# Patient Record
Sex: MALE | Race: WHITE | Employment: OTHER | URBAN - METROPOLITAN AREA
[De-identification: names, ages, dates, MRNs, and addresses within clinical notes are randomized per-mention and may not be internally consistent; named-entity substitution may affect disease eponyms.]

---

## 2017-01-13 ENCOUNTER — ANESTHESIA (OUTPATIENT)
Dept: ENDOSCOPY | Age: 62
End: 2017-01-13
Payer: COMMERCIAL

## 2017-01-13 ENCOUNTER — ANESTHESIA EVENT (OUTPATIENT)
Dept: ENDOSCOPY | Age: 62
End: 2017-01-13
Payer: COMMERCIAL

## 2017-01-13 ENCOUNTER — SURGERY (OUTPATIENT)
Age: 62
End: 2017-01-13

## 2017-01-13 PROCEDURE — 74011250636 HC RX REV CODE- 250/636

## 2017-01-13 RX ORDER — PROPOFOL 10 MG/ML
INJECTION, EMULSION INTRAVENOUS AS NEEDED
Status: DISCONTINUED | OUTPATIENT
Start: 2017-01-13 | End: 2017-01-13 | Stop reason: HOSPADM

## 2017-01-13 RX ORDER — SODIUM CHLORIDE 9 MG/ML
INJECTION, SOLUTION INTRAVENOUS
Status: DISCONTINUED | OUTPATIENT
Start: 2017-01-13 | End: 2017-01-13 | Stop reason: HOSPADM

## 2017-01-13 RX ADMIN — PROPOFOL 50 MG: 10 INJECTION, EMULSION INTRAVENOUS at 12:17

## 2017-01-13 RX ADMIN — PROPOFOL 50 MG: 10 INJECTION, EMULSION INTRAVENOUS at 12:10

## 2017-01-13 RX ADMIN — SODIUM CHLORIDE: 9 INJECTION, SOLUTION INTRAVENOUS at 11:23

## 2017-01-13 RX ADMIN — PROPOFOL 50 MG: 10 INJECTION, EMULSION INTRAVENOUS at 11:59

## 2017-01-13 RX ADMIN — PROPOFOL 50 MG: 10 INJECTION, EMULSION INTRAVENOUS at 12:03

## 2017-01-13 RX ADMIN — PROPOFOL 50 MG: 10 INJECTION, EMULSION INTRAVENOUS at 11:55

## 2017-01-13 NOTE — ANESTHESIA PREPROCEDURE EVALUATION
Anesthetic History               Review of Systems / Medical History      Pulmonary                   Neuro/Psych              Cardiovascular    Hypertension                   GI/Hepatic/Renal     GERD           Endo/Other             Other Findings              Physical Exam    Airway  Mallampati: I  TM Distance: > 6 cm  Neck ROM: normal range of motion   Mouth opening: Normal     Cardiovascular  Regular rate and rhythm,  S1 and S2 normal,  no murmur, click, rub, or gallop             Dental  No notable dental hx       Pulmonary  Breath sounds clear to auscultation               Abdominal  GI exam deferred       Other Findings            Anesthetic Plan    ASA: 2  Anesthesia type: MAC          Induction: Intravenous  Anesthetic plan and risks discussed with: Patient

## 2017-01-13 NOTE — ANESTHESIA POSTPROCEDURE EVALUATION
Post-Anesthesia Evaluation and Assessment    Patient: Ryland Ricks MRN: [id number]  SSN: [id number]    YOB: 1955  Age: 64 y.o. Sex: male       Cardiovascular Function/Vital Signs  Visit Vitals    BP 95/71    Pulse 86    Temp 36.8 °C (98.3 °F)    Resp 10    Ht 6' (1.829 m)    Wt 82.1 kg (181 lb)    SpO2 98%    BMI 24.55 kg/m2       Patient is status post MAC anesthesia for Procedure(s):  EGD  ENDOSCOPIC ULTRASOUND (EUS)  ESOPHAGOGASTRODUODENAL (EGD) BIOPSY. Nausea/Vomiting: None    Postoperative hydration reviewed and adequate. Pain:  Pain Scale 1: Adult Nonverbal Pain Scale (01/13/17 1235)  Pain Intensity 1: 0 (01/13/17 1235)   Managed    Neurological Status: At baseline    Mental Status and Level of Consciousness: Arousable    Pulmonary Status:   O2 Device: Room air (01/13/17 1235)   Adequate oxygenation and airway patent    Complications related to anesthesia: None    Post-anesthesia assessment completed.  No concerns    Signed By: Jasbir Chávez MD     January 13, 2017

## 2017-01-17 ENCOUNTER — HOSPITAL ENCOUNTER (OUTPATIENT)
Dept: ULTRASOUND IMAGING | Age: 62
Discharge: HOME | End: 2017-01-17
Attending: INTERNAL MEDICINE

## 2017-02-08 ENCOUNTER — OFFICE VISIT (OUTPATIENT)
Dept: SURGERY | Age: 62
End: 2017-02-08

## 2017-02-08 VITALS
RESPIRATION RATE: 16 BRPM | DIASTOLIC BLOOD PRESSURE: 88 MMHG | WEIGHT: 181 LBS | SYSTOLIC BLOOD PRESSURE: 140 MMHG | HEART RATE: 75 BPM | OXYGEN SATURATION: 98 % | TEMPERATURE: 98.4 F | HEIGHT: 72 IN | BODY MASS INDEX: 24.52 KG/M2

## 2017-02-08 DIAGNOSIS — K86.89 PANCREATIC DUCT OBSTRUCTION: Primary | ICD-10-CM

## 2017-02-08 NOTE — PROGRESS NOTES
1. Have you been to the ER, urgent care clinic since your last visit? Hospitalized since your last visit? No    2. Have you seen or consulted any other health care providers outside of the [address] [place] [address] since your last visit? Include any pap smears or colon screening.  Yes - Dr. Suly Cronin

## 2017-02-08 NOTE — MR AVS SNAPSHOT
Visit Information Date & Time Provider Department Dept. Phone Encounter #  
 2/8/2017  9:20 AM Remington Warner, [address] [facility] [phone] [id number] Upcoming Health Maintenance Date Due Hepatitis C Screening 1955 DTaP/Tdap/Td series (1 - Tdap) 9/27/1976 FOBT Q 1 YEAR AGE 50-75 9/27/2005 ZOSTER VACCINE AGE 60> 9/27/2015 INFLUENZA AGE 9 TO ADULT 8/1/2016 Allergies as of 2/8/2017  Review Complete On: 2/8/2017 By: Remington Warner MD  
  
 Severity Noted Reaction Type Reactions Aspirin  01/19/2016    Other (comments) Does not take d/t chronic pancreatitis. Current Immunizations  Reviewed on 2/16/2016 No immunizations on file. Not reviewed this visit You Were Diagnosed With   
  
 Codes Comments Pancreatic duct obstruction    -  Primary ICD-10-CM: K86.89 
ICD-9-CM: 577.8 Vitals BP Pulse Temp Resp Height(growth percentile) Weight(growth percentile) 140/88 (BP 1 Location: Right arm, BP Patient Position: Sitting) 75 98.4 °F (36.9 °C) (Oral) 16 6' (1.829 m) 181 lb (82.1 kg) SpO2 BMI Smoking Status 98% 24.55 kg/m2 Former Smoker BMI and BSA Data Body Mass Index Body Surface Area 24.55 kg/m 2 2.04 m 2 Preferred Pharmacy Pharmacy Name Phone Tammy Romo [facility] [phone] Your Updated Medication List  
  
   
This list is accurate as of: 2/8/17  9:54 AM.  Always use your most recent med list.  
  
  
  
  
 acetaminophen 325 mg tablet Commonly known as:  TYLENOL Take  by mouth as needed for Pain. CREON PO Take  by mouth. 85367 units po with large meal. 07083 po with small meal.15772 units po with a snack. FLUoxetine 20 mg tablet Commonly known as:  PROzac Take 20 mg by mouth daily. HYDROmorphone 2 mg tablet Commonly known as:  DILAUDID  
 Take 1 Tab by mouth every four (4) hours as needed. Max Daily Amount: 12 mg.  
  
 omeprazole 20 mg capsule Commonly known as:  PRILOSEC Take 20 mg by mouth daily. Introducing [facility] SERVICES! [facility] introduces [employer] patient portal. Now you can access parts of your medical record, email your doctor's office, and request medication refills online. 1. In your internet browser, go to [email or web address] 2. Click on the First Time User? Click Here link in the Sign In box. You will see the New Member Sign Up page. 3. Enter your [employer] Access Code exactly as it appears below. You will not need to use this code after youve completed the sign-up process. If you do not sign up before the expiration date, you must request a new code. · [employer] Access Code: [email or web address] Expires: 3/15/2017 11:35 AM 
 
4. Enter the last four digits of your Social Security Number (xxxx) and Date of Birth (mm/dd/yyyy) as indicated and click Submit. You will be taken to the next sign-up page. 5. Create a [employer]visors ID. This will be your [employer]advisors login ID and cannot be changed, so think of one that is secure and easy to remember. 6. Create a [employer]visors password. You can change your password at any time. 7. Enter your Password Reset Question and Answer. This can be used at a later time if you forget your password. 8. Enter your e-mail address. You will receive e-mail notification when new information is available in [address] 9. Click Sign Up. You can now view and download portions of your medical record. 10. Click the Download Summary menu link to download a portable copy of your medical information. If you have questions, please visit the Frequently Asked Questions section of the [employer]advisors website. Remember, [employer]visors is NOT to be used for urgent needs. For medical emergencies, dial 911. Now available from your iPhone and Android! Please provide this summary of care documentation to your next provider. Your primary care clinician is listed as Jose A Anderson. If you have any questions after today's visit, please call [phone].

## 2017-02-08 NOTE — PROGRESS NOTES
Surgery History and Physical    Subjective:      Twana Rinne. is a 64 y.o.  male who presents for evaluation of ongoing intermittent abdominal and back pain. He was referred by Dr. Perez Jurado to consider possible pancreatic surgery. His labs also showed mildly elevated CA 19-9 of 65. Today, he reports the pain started in Nov 2016 and indicated the back was worse than his abdomen. He notes that the recent attacks has been more intense but he also go days without pain. His appetite is unaffected. Previous imaging studies include: EGD (1/13/17) and CT ABD (12/22/16). 12/22/16 CT ABD:  Pancreatic atrophy with ductal dilatation is again seen. Unchanged coarse  calcifications and small cystic abnormalities in the pancreatic head. A small  cyst is now seen extending across the ampulla into the duodenum measuring 1 cm. Otherwise, no significant change in appearance of the pancreas.  No new fluid  collection or pseudocyst    Chief Complaint   Patient presents with    Surgical Follow-up       Patient Active Problem List    Diagnosis Date Noted    Pancreatic duct obstruction 02/08/2017    SBO (small bowel obstruction) ([address] 02/12/2016    Dehydration 02/08/2016    Temporary high blood pressure 02/02/2016    GERD (gastroesophageal reflux disease) 02/02/2016    Bile duct stricture 01/27/2016    Abdominal pain 08/14/2014     Past Medical History   Diagnosis Date    Adverse effect of anesthesia      woke up during ERCP - gasping    Bile duct stricture 1/27/2016    Chronic pain      chronic pancreatitis    GERD (gastroesophageal reflux disease)      Thompson's Esophagus    Ill-defined condition      blood infection x 4 per pt    Other ill-defined conditions(799.89)      insomnia    Pancreatic duct obstruction 2/8/2017    Pancreatitis     Psychiatric disorder      Depression    Unspecified adverse effect of anesthesia      woke up during ERCP      Past Surgical History   Procedure Laterality Date    Hx tonsillectomy      Vascular surgery procedure unlist       vein stripped from left leg    Hx hernia repair       age 2 yrs left inguinal    Hx other surgical       ERCPS - stents and removal since 1995 last 8/2014     Hx other surgical       ERCP with stent 4/2015    Hx orthopaedic       ganglion cyst removed left wrist x 2    Hx orthopaedic       left knee fracture 2013-1/30    Hx orthopaedic       meniscus repair (2) on left knee    Hx other surgical  2/09/16     LAPAROTOMY EXPLORATORY; SMALL BOWEL RESECTION    Hx other surgical       1. Exploratory laparotomy 2. Resection of the necrotic proximal small bowel and oversewing of enteroenterotomy closure - [facility]-Dr. Billie Howard      Social History   Substance Use Topics    Smoking status: Former Smoker     Packs/day: 0.50     Years: 5.00     Quit date: 1/29/1977    Smokeless tobacco: Never Used      Comment: quit smoking cigarettes 30 yrs ago    Alcohol use No      Comment: none in 6-8 yrs      Family History   Problem Relation Age of Onset    Stroke Mother      ?  Stroke Father     Other Brother      Agent Orange    Stroke Brother     Anesth Problems Neg Hx       Prior to Admission medications    Medication Sig Start Date End Date Taking? Authorizing Provider   omeprazole (PRILOSEC) 20 mg capsule Take 20 mg by mouth daily. Yes Historical Provider   acetaminophen (TYLENOL) 325 mg tablet Take  by mouth as needed for Pain. Yes Historical Provider   FLUoxetine (PROZAC) 20 mg tablet Take 20 mg by mouth daily. Yes Historical Provider   LIPASE/PROTEASE/AMYLASE (CREON PO) Take  by mouth. 52845 units po with large meal. 71964 po with small meal.13385 units po with a snack. Yes Historical Provider   HYDROmorphone (DILAUDID) 2 mg tablet Take 1 Tab by mouth every four (4) hours as needed. Max Daily Amount: 12 mg. 2/19/16   Florencia Patel MD     Allergies   Allergen Reactions    Aspirin Other (comments)     Does not take d/t chronic pancreatitis. Review of Systems:    A comprehensive review of systems was negative except for that written in the History of Present Illness. Objective:     Visit Vitals    /88 (BP 1 Location: Right arm, BP Patient Position: Sitting)    Pulse 75    Temp 98.4 °F (36.9 °C) (Oral)    Resp 16    Ht 6' (1.829 m)    Wt 181 lb (82.1 kg)    SpO2 98%    BMI 24.55 kg/m2       Physical Exam:  General appearance: alert, cooperative, no distress, appears stated age  Head: Normocephalic, without obvious abnormality, atraumatic  Abdomen: soft, non-tender. Bowel sounds normal. No masses,  no organomegaly, no ascites, incision well healed   Lymph nodes: Cervical, supraclavicular, and axillary nodes normal.  Neurologic: Grossly normal      Assessment:       ICD-10-CM ICD-9-CM    1. Pancreatic duct obstruction K86.89 577.8         I believe that the patient will benefit from a PEUSTOW procedure to relieve his distal pancreatic ducal obstruction. Plan:     I'll be presenting his case to the hepatic biliary conference before making any recommendations to the patient. Written by geetha Farias, as dictated by Chandler Siemens Brandon Sol, MD.    Total face to face time with patient: 17 minutes. Greater than 50% of the time was spent in counseling. Signed By: Chandler Siemens Brandon Sol, MD    February 8, 2017

## 2017-02-13 ENCOUNTER — TELEPHONE (OUTPATIENT)
Dept: SURGERY | Age: 62
End: 2017-02-13

## 2017-02-13 NOTE — TELEPHONE ENCOUNTER
Told him we were going to discuss his case this Thursday. I am still inclined to do a Peustow.   He has had no pain since the visit to the office

## 2017-02-17 ENCOUNTER — TELEPHONE (OUTPATIENT)
Dept: SURGERY | Age: 62
End: 2017-02-17

## 2017-02-17 NOTE — TELEPHONE ENCOUNTER
Discussed exploration and biopsy of the head of the pancreas first.  Would do Peustow if that is OK, but may need to do a Whipple if things look like that would be the best choice.   He agrees will call back when he is ready

## 2017-07-12 ENCOUNTER — HOSPITAL ENCOUNTER (OUTPATIENT)
Age: 62
Setting detail: OUTPATIENT SURGERY
Discharge: HOME | End: 2017-07-12
Attending: INTERNAL MEDICINE | Admitting: INTERNAL MEDICINE
Payer: COMMERCIAL

## 2017-07-12 ENCOUNTER — APPOINTMENT (OUTPATIENT)
Dept: ULTRASOUND IMAGING | Age: 62
End: 2017-07-12
Attending: INTERNAL MEDICINE
Payer: COMMERCIAL

## 2017-07-12 ENCOUNTER — ANESTHESIA (OUTPATIENT)
Dept: ENDOSCOPY | Age: 62
End: 2017-07-12
Payer: COMMERCIAL

## 2017-07-12 ENCOUNTER — ANESTHESIA EVENT (OUTPATIENT)
Dept: ENDOSCOPY | Age: 62
End: 2017-07-12
Payer: COMMERCIAL

## 2017-07-12 VITALS
DIASTOLIC BLOOD PRESSURE: 75 MMHG | BODY MASS INDEX: 24.68 KG/M2 | WEIGHT: 182.19 LBS | HEART RATE: 67 BPM | HEIGHT: 72 IN | TEMPERATURE: 97.6 F | RESPIRATION RATE: 16 BRPM | OXYGEN SATURATION: 98 % | SYSTOLIC BLOOD PRESSURE: 111 MMHG

## 2017-07-12 PROCEDURE — 77030036673 HC NDL BIOP ENDOSC SHRKCOR PRELD COVD -E: Performed by: INTERNAL MEDICINE

## 2017-07-12 PROCEDURE — 76040000019: Performed by: INTERNAL MEDICINE

## 2017-07-12 PROCEDURE — 88173 CYTOPATH EVAL FNA REPORT: CPT | Performed by: INTERNAL MEDICINE

## 2017-07-12 PROCEDURE — 74011000250 HC RX REV CODE- 250

## 2017-07-12 PROCEDURE — 74011000258 HC RX REV CODE- 258

## 2017-07-12 PROCEDURE — 74011250636 HC RX REV CODE- 250/636

## 2017-07-12 PROCEDURE — 76060000031 HC ANESTHESIA FIRST 0.5 HR: Performed by: INTERNAL MEDICINE

## 2017-07-12 PROCEDURE — 88307 TISSUE EXAM BY PATHOLOGIST: CPT | Performed by: INTERNAL MEDICINE

## 2017-07-12 RX ORDER — MIDAZOLAM HYDROCHLORIDE 1 MG/ML
.25-1 INJECTION, SOLUTION INTRAMUSCULAR; INTRAVENOUS
Status: DISCONTINUED | OUTPATIENT
Start: 2017-07-12 | End: 2017-07-12 | Stop reason: HOSPADM

## 2017-07-12 RX ORDER — PROPOFOL 10 MG/ML
INJECTION, EMULSION INTRAVENOUS AS NEEDED
Status: DISCONTINUED | OUTPATIENT
Start: 2017-07-12 | End: 2017-07-12 | Stop reason: HOSPADM

## 2017-07-12 RX ORDER — ATROPINE SULFATE 0.1 MG/ML
0.5 INJECTION INTRAVENOUS
Status: DISCONTINUED | OUTPATIENT
Start: 2017-07-12 | End: 2017-07-12 | Stop reason: HOSPADM

## 2017-07-12 RX ORDER — NALOXONE HYDROCHLORIDE 0.4 MG/ML
0.4 INJECTION, SOLUTION INTRAMUSCULAR; INTRAVENOUS; SUBCUTANEOUS
Status: DISCONTINUED | OUTPATIENT
Start: 2017-07-12 | End: 2017-07-12 | Stop reason: HOSPADM

## 2017-07-12 RX ORDER — LIDOCAINE HYDROCHLORIDE 20 MG/ML
INJECTION, SOLUTION EPIDURAL; INFILTRATION; INTRACAUDAL; PERINEURAL AS NEEDED
Status: DISCONTINUED | OUTPATIENT
Start: 2017-07-12 | End: 2017-07-12 | Stop reason: HOSPADM

## 2017-07-12 RX ORDER — SODIUM CHLORIDE 9 MG/ML
50 INJECTION, SOLUTION INTRAVENOUS CONTINUOUS
Status: DISCONTINUED | OUTPATIENT
Start: 2017-07-12 | End: 2017-07-12 | Stop reason: HOSPADM

## 2017-07-12 RX ORDER — SODIUM CHLORIDE 0.9 % (FLUSH) 0.9 %
5-10 SYRINGE (ML) INJECTION EVERY 8 HOURS
Status: DISCONTINUED | OUTPATIENT
Start: 2017-07-12 | End: 2017-07-12 | Stop reason: HOSPADM

## 2017-07-12 RX ORDER — SODIUM CHLORIDE 9 MG/ML
INJECTION, SOLUTION INTRAVENOUS
Status: DISCONTINUED | OUTPATIENT
Start: 2017-07-12 | End: 2017-07-12 | Stop reason: HOSPADM

## 2017-07-12 RX ORDER — DEXTROMETHORPHAN/PSEUDOEPHED 2.5-7.5/.8
1.2 DROPS ORAL
Status: DISCONTINUED | OUTPATIENT
Start: 2017-07-12 | End: 2017-07-12 | Stop reason: HOSPADM

## 2017-07-12 RX ORDER — SODIUM CHLORIDE 0.9 % (FLUSH) 0.9 %
5-10 SYRINGE (ML) INJECTION AS NEEDED
Status: DISCONTINUED | OUTPATIENT
Start: 2017-07-12 | End: 2017-07-12 | Stop reason: HOSPADM

## 2017-07-12 RX ORDER — FLUMAZENIL 0.1 MG/ML
0.2 INJECTION INTRAVENOUS
Status: DISCONTINUED | OUTPATIENT
Start: 2017-07-12 | End: 2017-07-12 | Stop reason: HOSPADM

## 2017-07-12 RX ORDER — EPINEPHRINE 0.1 MG/ML
1 INJECTION INTRACARDIAC; INTRAVENOUS
Status: DISCONTINUED | OUTPATIENT
Start: 2017-07-12 | End: 2017-07-12 | Stop reason: HOSPADM

## 2017-07-12 RX ORDER — FENTANYL CITRATE 50 UG/ML
100 INJECTION, SOLUTION INTRAMUSCULAR; INTRAVENOUS
Status: DISCONTINUED | OUTPATIENT
Start: 2017-07-12 | End: 2017-07-12 | Stop reason: HOSPADM

## 2017-07-12 RX ADMIN — LIDOCAINE HYDROCHLORIDE 40 MG: 20 INJECTION, SOLUTION EPIDURAL; INFILTRATION; INTRACAUDAL; PERINEURAL at 15:17

## 2017-07-12 RX ADMIN — PROPOFOL 50 MG: 10 INJECTION, EMULSION INTRAVENOUS at 15:19

## 2017-07-12 RX ADMIN — PROPOFOL 50 MG: 10 INJECTION, EMULSION INTRAVENOUS at 15:31

## 2017-07-12 RX ADMIN — PROPOFOL 50 MG: 10 INJECTION, EMULSION INTRAVENOUS at 15:24

## 2017-07-12 RX ADMIN — PROPOFOL 50 MG: 10 INJECTION, EMULSION INTRAVENOUS at 15:27

## 2017-07-12 RX ADMIN — PROPOFOL 50 MG: 10 INJECTION, EMULSION INTRAVENOUS at 15:33

## 2017-07-12 RX ADMIN — PROPOFOL 50 MG: 10 INJECTION, EMULSION INTRAVENOUS at 15:21

## 2017-07-12 RX ADMIN — PROPOFOL 150 MG: 10 INJECTION, EMULSION INTRAVENOUS at 15:17

## 2017-07-12 RX ADMIN — PROPOFOL 50 MG: 10 INJECTION, EMULSION INTRAVENOUS at 15:29

## 2017-07-12 RX ADMIN — PROPOFOL 50 MG: 10 INJECTION, EMULSION INTRAVENOUS at 15:35

## 2017-07-12 RX ADMIN — SODIUM CHLORIDE: 9 INJECTION, SOLUTION INTRAVENOUS at 15:07

## 2017-07-12 NOTE — ANESTHESIA POSTPROCEDURE EVALUATION
Post-Anesthesia Evaluation and Assessment    Patient: Chase Lacy. MRN: [id number]  SSN: [id number]    YOB: 1955  Age: 64 y.o. Sex: male       Cardiovascular Function/Vital Signs  Visit Vitals    /75    Pulse 84    Temp 36.8 °C (98.2 °F)    Resp 18    Ht 6' (1.829 m)    Wt 82.6 kg (182 lb 3 oz)    SpO2 94%    BMI 24.71 kg/m2       Patient is status post MAC anesthesia for Procedure(s):  LINEAR EUS WITH PATH  FINE NEEDLE ASPIRATION. Nausea/Vomiting: None    Postoperative hydration reviewed and adequate. Pain:  Pain Scale 1: Visual (07/12/17 1544)  Pain Intensity 1: 0 (07/12/17 1544)   Managed    Neurological Status: At baseline    Mental Status and Level of Consciousness: Arousable    Pulmonary Status:   O2 Device: CO2 nasal cannula (07/12/17 1537)   Adequate oxygenation and airway patent    Complications related to anesthesia: None    Post-anesthesia assessment completed.  No concerns    Signed By: Leigh Collier MD     July 12, 2017

## 2017-07-12 NOTE — PROCEDURES
[address]  [address] [facility] [address], [address]   [phone]                                Endoscopic Ultrasound    NAME:  Isaac Fleming. :   1955   MRN:   [id number]       Date/Time:  2017   Procedure Type: Linear Upper EUS with FNA    Indications: Pancreatitis - chronic, Abnormal CT scan showing pancreas head mass    Pre-operative Diagnosis: see indication above    Post-operative Diagnosis:  See findings below    : Washington Whaley MD    Referring Provider: -Sofie Garg MD    Anethesia/Sedation:  MAC anesthesia      Procedure Details   After infom consent was obtained for the procedure, with all risks and benefits of procedure explained the patient was taken to the endoscopy suite and placed in the left lateral decubitus position. Following sequential administration of sedation as per above, the linear echoendoscope was inserted into the mouth and advanced under direct vision to second portion of the duodenum. A careful inspection was made as the gastroscope was withdrawn, including a retroflexed view of the proximal stomach; findings and interventions are described below. Findings:     Endoscopic:            Esophagus: Z line was irregular. No esophagitis or mass was seen                        Stomach: normal                         Duodenum/jejunum: A short segment stricture was seen in the 2nd portion of duodenum in the region of ampulla. There was mucosal edema and congestion, more so on the medial duodenal wall. The stricture could be negotiated with mild resistance. No definite mass was seen.  Rest of the duodenum was normal     Ultrasound:                        Esophagus: not examined                        Stomach: not examined                        Pancreas:                                               Areas examined: the entire gland                                              Parenchyma: Extensive calcifications, and heterogenous appearing pancreas was seen in the head of pancreas. The head was not as bulky as noted on previous EUS and CT scan. Inflammatory stranding appears to have improved. There was marked tortuosity of the pancreatic duct in the head and body. No definite mass was seen. Some of the ectatic and dilated side branches gave an impression of cystic collections. Liver:                                               Parenchyma: normal left lobe                                              Gallbladder: surgically absent                                              Bile Duct: the common bile duct was not visualized well due to anastomosed bowel loop interposing between. Lymph Node: no adenopathy      Specimen Removed:  Biopsy of  the head of the pancreas    Complications: None. EBL:  None.     Interventions: Fine needle aspirate for biopsy of  pancreas head using a 25 gauge SharkCore needle with 2 of passes with preliminary results suggesting benign      Recommendations:   -Await final cytology results  -Watch for complications  -Continue current medications  -Follow up with Dr Shona Mccarthy after final cytology results are back for consideration of total pancreatectomy and auto islet cell transplantat  -Follow up with me as scheduled    Sunshine Andrews MD  7/12/2017  3:48 PM

## 2017-07-12 NOTE — PROGRESS NOTES
Gayatri Higuera.  1955  [id number]    Situation:    Scheduled Procedure: Procedure(s):  LINEAR EUS WITH PATH  Verbal report received from: HEATHER SALMERON RN, RN  Preoperative diagnosis: CYST OF PANCREAS, STRICTURE OF BILE DUCT    Background:    Procedure: Procedure(s):  LINEAR EUS WITH PATH  Physician performing procedure; Dr. Main Shankar MD    SBAR QUESTIONS FLOOR TO ENDO RN    NPO Status/Last PO Intake: NPO    Pregnancy Test:Not applicable If yes, result: none    Is the patient taking Blood Thinners: NO If yes, list: 0 and last taken 0  Is the patient diabetic:no       If yes, what was the last BS:  0  Time taken? 0  Anything given? no           Does the patient have a Pacemaker/Defibrillator in place?: no   Does the patient need antibiotics before/during/after procedure: no   If the patient is having a colon, How much prep was drank? N/A   What were the Colon prep results? N/A   Does the patient have SCD in place:no   Is patient on CONTACT precautions:no        If yes, what kind of CONTACT precautions: NONE    Assessment:  Are the vital signs stable prior to patient coming to ENDO?  yes  Is the patient alert/oriented and able to sign consent for the procedures:yes  How does the patient's abdomen feel prior to coming to ENDO? round and soft yes   Does the patient have a patient IV in place?  YES     Recommendation:  Family or Friend present yes     Permission to share finding with Family or Friend yes

## 2017-07-12 NOTE — IP AVS SNAPSHOT
[address] 
[phone] Patient: Gayatri Campo MRN: [id number] RYT:3/21/0013 You are allergic to the following Allergen Reactions Aspirin Other (comments) Does not take d/t chronic pancreatitis. Recent Documentation Height Weight BMI Smoking Status 1.829 m 82.6 kg 24.71 kg/m2 Former Smoker Emergency Contacts Name Discharge Info Relation Home Work Mobile [address] [facility] CAREGIVER [3] Spouse [3] [phone] About your hospitalization You were admitted on:  July 12, 2017 You last received care in the:  [facility] ENDOSCOPY You were discharged on:  July 12, 2017 Unit phone number:  [phone] Why you were hospitalized Your primary diagnosis was:  Not on File Providers Seen During Your Hospitalizations Provider Role Specialty Primary office phone Main Shankar MD Attending Provider Gastroenterology [phone] Your Primary Care Physician (PCP) Primary Care Physician Office Phone Office Fax Donna Hernández [phone] Follow-up Information None Current Discharge Medication List  
  
CONTINUE these medications which have NOT CHANGED Dose & Instructions Dispensing Information Comments Morning Noon Evening Bedtime * CREON PO Your last dose was: Your next dose is: Take  by mouth. 02316 units po with large meal.  
 Refills:  0  
     
   
   
   
  
 * CREON PO Your last dose was: Your next dose is: Take  by mouth. 43137 units po with a small meal.  
 Refills:  0  
     
   
   
   
  
 * CREON PO Your last dose was: Your next dose is: Take  by mouth. 95583 units po with a snack. Refills:  0 FLUoxetine 20 mg tablet Commonly known as:  PROzac Your last dose was: Your next dose is:    
   
   
 Dose:  20 mg Take 20 mg by mouth daily. Refills:  0  
     
   
   
   
  
 omeprazole 20 mg capsule Commonly known as:  PRILOSEC Your last dose was: Your next dose is:    
   
   
 Dose:  20 mg Take 20 mg by mouth daily. Refills:  0  
     
   
   
   
  
 * Notice: This list has 3 medication(s) that are the same as other medications prescribed for you. Read the directions carefully, and ask your doctor or other care provider to review them with you. Discharge Instructions [facility] 
[address] [facility] [address] [place], [address] 
[phone] DISCHARGE INSTRUCTIONS Soheila Marshall. 
[id number] 
1955 DISCOMFORT: 
Sore throat- throat lozenges or warm salt water gargle 
redness at IV site- apply warm compress to area; if redness or soreness persist- contact your physician Gaseous discomfort- walking, belching will help relieve any discomfort You may not operate a vehicle for 12 hours You may not engage in an occupation involving machinery or appliances for rest of today You may not drink alcoholic beverages for at least 12 hours Avoid making any critical decisions for at least 24 hour DIET You may eat and drink after you leave. You may resume your regular diet  however -  remember your colon is empty and a heavy meal will produce gas. Avoid these foods:  vegetables, fried / greasy foods, carbonated drinks ACTIVITY You may resume your normal daily activities Spend the remainder of the day resting -  avoid any strenuous activity. CALL M.D. ANY SIGN OF Increasing pain, nausea, vomiting Abdominal distension (swelling) New increased bleeding (oral or rectal) Fever (chills) Pain in chest area Bloody discharge from nose or mouth Shortness of breath Follow-up Instructions: Call Dr. James Jimenez for any questions or problems. If we took a biopsy please call the office within 2 weeks to discuss your                             pathology results. Telephone # [phone] Continue same medications. Discharge Orders None Introducing [facility]! [address] introduces [employer] patient portal. Now you can access parts of your medical record, email your doctor's office, and request medication refills online. 1. In your internet browser, go to [email or web address] 2. Click on the First Time User? Click Here link in the Sign In box. You will see the New Member Sign Up page. 3. Enter your [employer] Access Code exactly as it appears below. You will not need to use this code after youve completed the sign-up process. If you do not sign up before the expiration date, you must request a new code. · [employer] Access Code: [email or web address] Expires: 10/10/2017  3:57 PM 
 
4. Enter the last four digits of your Social Security Number (xxxx) and Date of Birth (mm/dd/yyyy) as indicated and click Submit. You will be taken to the next sign-up page. 5. Create a Tru[employer]o ID. This will be your [employer] login ID and cannot be changed, so think of one that is secure and easy to remember. 6. Create a [employer] password. You can change your password at any time. 7. Enter your Password Reset Question and Answer. This can be used at a later time if you forget your password. 8. Enter your e-mail address. You will receive e-mail notification when new information is available in [address] 9. Click Sign Up. You can now view and download portions of your medical record. 10. Click the Download Summary menu link to download a portable copy of your medical information. If you have questions, please visit the Frequently Asked Questions section of the [employer] website. Remember, [employer] is NOT to be used for urgent needs. For medical emergencies, dial 911. Now available from your iPhone and Android! General Information Please provide this summary of care documentation to your next provider. Patient Signature:  ____________________________________________________________ Date:  ____________________________________________________________  
  
Gearldine Moulds Provider Signature:  ____________________________________________________________ Date:  ____________________________________________________________

## 2017-07-12 NOTE — DISCHARGE INSTRUCTIONS
[address]  [address] [facility] [address]  [id number]  1955    DISCOMFORT:  Sore throat- throat lozenges or warm salt water gargle  redness at IV site- apply warm compress to area; if redness or soreness persist- contact your physician  Gaseous discomfort- walking, belching will help relieve any discomfort  You may not operate a vehicle for 12 hours  You may not engage in an occupation involving machinery or appliances for rest of today  You may not drink alcoholic beverages for at least 12 hours  Avoid making any critical decisions for at least 24 hour  DIET  You may eat and drink after you leave. You may resume your regular diet - however -  remember your colon is empty and a heavy meal will produce gas. Avoid these foods:  vegetables, fried / greasy foods, carbonated drinks    ACTIVITY  You may resume your normal daily activities   Spend the remainder of the day resting -  avoid any strenuous activity. CALL M.D. ANY SIGN OF   Increasing pain, nausea, vomiting  Abdominal distension (swelling)  New increased bleeding (oral or rectal)  Fever (chills)  Pain in chest area  Bloody discharge from nose or mouth  Shortness of breath    Follow-up Instructions:   Call Dr. Luis Fernando Packer for any questions or problems. If we took a biopsy please call the office within 2 weeks to discuss your                             pathology results. Telephone # [phone]        Continue same medications.

## 2017-07-12 NOTE — PROGRESS NOTES
Endoscope was pre-cleaned at bedside immediately following procedure by KAROLINA/ [facility]. Georgi Cartagena

## 2017-07-12 NOTE — ANESTHESIA PREPROCEDURE EVALUATION
Anesthetic History               Review of Systems / Medical History  Patient summary reviewed, nursing notes reviewed and pertinent labs reviewed    Pulmonary                   Neuro/Psych              Cardiovascular    Hypertension              Exercise tolerance: >4 METS     GI/Hepatic/Renal     GERD          Comments: Pancreatic cyst Endo/Other             Other Findings              Physical Exam    Airway  Mallampati: II  TM Distance: > 6 cm  Neck ROM: normal range of motion   Mouth opening: Normal     Cardiovascular    Rhythm: regular  Rate: normal         Dental  No notable dental hx       Pulmonary  Breath sounds clear to auscultation               Abdominal  Abdominal exam normal       Other Findings            Anesthetic Plan    ASA: 2  Anesthesia type: MAC          Induction: Intravenous  Anesthetic plan and risks discussed with: Patient

## 2017-07-12 NOTE — ROUTINE PROCESS
Rosina Doann  1955  [id number]    Situation:  Verbal report received from: Nilsa Donnelly RN  Procedure: Procedure(s):  LINEAR EUS WITH PATH  FINE NEEDLE ASPIRATION    Background:    Preoperative diagnosis: CYST OF PANCREAS, STRICTURE OF BILE DUCT  Postoperative diagnosis: chronic pancreatitis    :  Dr. Taz Ramsey  Assistant(s): Endoscopy Technician-1: Liv Leary  Endoscopy RN-1: Melchor Rincon RN    Specimens:   ID Type Source Tests Collected by Time Destination   1 : cytology Fresh Pancreas  Mario Lawrence MD 7/12/2017 1535 Cytology     H. Pylori  no    Assessment:  Intra-procedure medications       Anesthesia gave intra-procedure sedation and medications, see anesthesia flow sheet yes    Intravenous fluids: NS@ KVO     Vital signs stable     Abdominal assessment: round and soft   Recommendation:  Discharge patient per MD order  Family or Friend WIfe  Permission to share finding with family or friend yes

## 2017-07-17 NOTE — H&P
[address]  [address] [facility] 140 [facility], [address]  [phone]                                History and Physical     NAME: Rena Valenzuela. :  1955   MRN:  [id number]     HPI:  The patient was seen and examined. Past Surgical History:   Procedure Laterality Date    HX GI      EUS/EGD    HX HERNIA REPAIR      age 2 yrs left inguinal    HX ORTHOPAEDIC      ganglion cyst removed left wrist x 2    HX ORTHOPAEDIC      left knee fracture -    HX ORTHOPAEDIC      meniscus repair (2) on left knee    HX OTHER SURGICAL      ERCPS - stents and removal since  last 2014     HX OTHER SURGICAL      ERCP with stent 2015    HX OTHER SURGICAL  16    LAPAROTOMY EXPLORATORY; SMALL BOWEL RESECTION    HX OTHER SURGICAL      1. Exploratory laparotomy 2.  Resection of the necrotic proximal small bowel and oversewing of enteroenterotomy closure - [facility]-Dr. Yariel York    HX TONSILLECTOMY      [place] PANCREAS SURGERY PROC UNLISTED      [place]    VASCULAR SURGERY PROCEDURE UNLIST      vein stripped from left leg     Past Medical History:   Diagnosis Date    Adverse effect of anesthesia     woke up during ERCP - gasping    Bile duct stricture 2016    Chronic pain     chronic pancreatitis    GERD (gastroesophageal reflux disease)     Thompson's Esophagus    Ill-defined condition     blood infection x 4 per pt    Ill-defined condition     pancreas cyst    Other ill-defined conditions     insomnia    Pancreatic duct obstruction 2017    Pancreatitis     Psychiatric disorder     Depression    Unspecified adverse effect of anesthesia     woke up during ERCP     Social History   Substance Use Topics    Smoking status: Former Smoker     Packs/day: 0.50     Years: 5.00     Quit date: 1977    Smokeless tobacco: Never Used      Comment: quit smoking cigarettes 30 yrs ago    Alcohol use No      Comment: none in 6-8 yrs     Allergies   Allergen Reactions  Aspirin Other (comments)     Does not take d/t chronic pancreatitis. Family History   Problem Relation Age of Onset    Stroke Mother      ?  Stroke Father     Other Brother      Agent Orange    Stroke Brother     Anesth Problems Neg Hx      No current facility-administered medications for this encounter. Current Outpatient Prescriptions   Medication Sig    LIPASE/PROTEASE/AMYLASE (CREON PO) Take  by mouth. 90936 units po with a small meal.    LIPASE/PROTEASE/AMYLASE (CREON PO) Take  by mouth. 98830 units po with a snack.  omeprazole (PRILOSEC) 20 mg capsule Take 20 mg by mouth daily.  FLUoxetine (PROZAC) 20 mg tablet Take 20 mg by mouth daily.  LIPASE/PROTEASE/AMYLASE (CREON PO) Take  by mouth. 20747 units po with large meal.         PHYSICAL EXAM:  General: WD, WN. Alert, cooperative, no acute distress    HEENT: NC, Atraumatic. PERRLA, EOMI. Anicteric sclerae. Lungs:  CTA Bilaterally. No Wheezing/Rhonchi/Rales. Heart:  Regular  rhythm,  No murmur, No Rubs, No Gallops  Abdomen: Soft, Non distended, Non tender.  +Bowel sounds, no HSM  Extremities: No c/c/e  Neurologic:  CN 2-12 gi, Alert and oriented X 3. No acute neurological distress   Psych:   Good insight. Not anxious nor agitated. The heart, lungs and mental status were satisfactory for the administration of MAC sedation and for the procedure.       Mallampati score: 3       Assessment:   · Pancreas mass  · Chronic pancreatitis    Plan:   · Endoscopic procedure  · MAC sedation   ·

## 2018-08-15 ENCOUNTER — HOSPITAL ENCOUNTER (OUTPATIENT)
Dept: VASCULAR SURGERY | Age: 63
Discharge: HOME | End: 2018-08-15
Attending: FAMILY MEDICINE
Payer: COMMERCIAL

## 2018-08-15 DIAGNOSIS — I65.29 CAROTID ARTERY STENOSIS: ICD-10-CM

## 2018-08-15 PROCEDURE — 93880 EXTRACRANIAL BILAT STUDY: CPT

## 2018-08-15 NOTE — PROCEDURES
[facility]  *** FINAL REPORT ***    Name: Indio Snell  MRN: [id number]    Outpatient  : 27 Sep 1955  HIS Order #: [id number]  [id number] Visit #: [id number]  Date: 15 Aug 2018    TYPE OF TEST: Cerebrovascular Duplex    REASON FOR TEST  Carotid stenosis    Right Carotid:-             Proximal               Mid                 Distal  cm/s  Systolic  Diastolic  Systolic  Diastolic  Systolic  Diastolic  CCA:     27.0      26.0                            58.0      19.0  Bulb:  ECA:    108.0  ICA:    114.0      47.0      146.0      57.0      115.0      22.0  ICA/CCA:  2.0       2.5    ICA Stenosis:    Right Vertebral:-  Finding: Antegrade  Sys:       43.0  Laurel:    Right Subclavian:    Left Carotid:-            Proximal                Mid                 Distal  cm/s  Systolic  Diastolic  Systolic  Diastolic  Systolic  Diastolic  CCA:     97.7      20.0                            67.0      25.0  Bulb:  ECA:    189.0      40.0  ICA:     87.0      38.0       66.0      28.0       71.0      28.0  ICA/CCA:  1.3       1.5    ICA Stenosis: Unknown    Left Vertebral:-  Finding: Occluded  Sys:  Laurel:    Left Subclavian:    INTERPRETATION/FINDINGS  PROCEDURE:  Color duplex ultrasound imaging of extracranial  cerebrovascular arteries. FINDINGS:       Right:  Internal carotid velocity is elevated to a level  consistent with a 50 to 69 percent stenosis; there is narrowing of the   flow channel on color Doppler imaging and calcific-mix plaque on  B-mode imaging. The common and external carotid arteries are patent  and without evidence of hemodynamically significant stenosis. Left:  Internal carotid velocity is within normal limits. There  is narrowing of the internal carotid flow channel on color Doppler  imaging and calcific plaque on B-mode imaging, consistent with less  than 50 percent stenosis. The common carotid artery is patent and  without evidence of hemodynamically significant stenosis.   The  external carotid contains elevated velocities. IMPRESSION:  Consistent with 50-69% stenosis of the right internal  carotid and less than 50% stenosis of the left internal carotid. Right vertebral is patent with antegrade flow. No flow is detected in   the left vertebral by pulsed Doppler or color Doppler imaging and is  suggestive of an occlusion. ADDITIONAL COMMENTS    I have personally reviewed the data relevant to the interpretation of  this  study.     TECHNOLOGIST: Pasquale Armas RVT  Signed: 08/15/2018 11:37 AM    PHYSICIAN: Jesus Sagastume MD  Signed: 08/16/2018 05:56 AM

## 2018-08-22 ENCOUNTER — OFFICE VISIT (OUTPATIENT)
Dept: SURGERY | Age: 63
End: 2018-08-22

## 2018-08-22 VITALS
HEIGHT: 72 IN | DIASTOLIC BLOOD PRESSURE: 80 MMHG | SYSTOLIC BLOOD PRESSURE: 130 MMHG | WEIGHT: 183 LBS | TEMPERATURE: 98.2 F | HEART RATE: 68 BPM | OXYGEN SATURATION: 97 % | BODY MASS INDEX: 24.79 KG/M2 | RESPIRATION RATE: 16 BRPM

## 2018-08-22 DIAGNOSIS — K43.2 INCISIONAL HERNIA, WITHOUT OBSTRUCTION OR GANGRENE: Primary | ICD-10-CM

## 2018-08-22 NOTE — PROGRESS NOTES
1. Have you been to the ER, urgent care clinic since your last visit? Hospitalized since your last visit? No    2. Have you seen or consulted any other health care providers outside of the [facility] since your last visit? Include any pap smears or colon screening. No     Patient states he had a 'pancreatic attack' on [address] [facility] [place] Day weekend, 5/2018. Patient states he is wearing 'a belt' and it does help the mid abdominal hernia pain.

## 2018-08-22 NOTE — PROGRESS NOTES
Subjective:        Juliana Mann is a 58 y.o.  male referred by Dr Kittie Buerger who presents for an evaluation of a hernia. The pt was having episodes of pain in his incisional hernia with exertion (e.g., yard work). He bought a back support belt and has had no further problems with the pain; even his back doesn't bother him anymore. He was concerned that wearing the belt might be deleterious to his hernia.     Chief Complaint   Patient presents with    Incisional Hernia     bulge to right of mid abdominal incision       Patient Active Problem List    Diagnosis Date Noted    Incisional hernia, without obstruction or gangrene 08/22/2018    Pancreatic duct obstruction 02/08/2017    SBO (small bowel obstruction) ([facility] [address] 02/12/2016    Dehydration 02/08/2016    Temporary high blood pressure 02/02/2016    GERD (gastroesophageal reflux disease) 02/02/2016    Bile duct stricture 01/27/2016    Abdominal pain 08/14/2014     Past Medical History:   Diagnosis Date    Adverse effect of anesthesia     woke up during ERCP - gasping    Bile duct stricture 1/27/2016    Chronic pain     chronic pancreatitis    GERD (gastroesophageal reflux disease)     Thompson's Esophagus    Ill-defined condition     blood infection x 4 per pt    Ill-defined condition     pancreas cyst    Other ill-defined conditions(799.89)     insomnia    Pancreatic duct obstruction 2/8/2017    Pancreatitis     Psychiatric disorder     Depression    Unspecified adverse effect of anesthesia     woke up during ERCP      Past Surgical History:   Procedure Laterality Date    HX GI      EUS/EGD    HX HERNIA REPAIR      age 2 yrs left inguinal    HX ORTHOPAEDIC      ganglion cyst removed left wrist x 2    HX ORTHOPAEDIC      left knee fracture 2013-1/30    HX ORTHOPAEDIC      meniscus repair (2) on left knee    HX OTHER SURGICAL      ERCPS - stents and removal since 1995 last 8/2014     HX OTHER SURGICAL      ERCP with stent 4/2015    HX OTHER SURGICAL  2/09/16    LAPAROTOMY EXPLORATORY; SMALL BOWEL RESECTION    HX OTHER SURGICAL      1. Exploratory laparotomy 2. Resection of the necrotic proximal small bowel and oversewing of enteroenterotomy closure - [facility]-Dr. Bao Ambrocio    HX TONSILLECTOMY      [place] PANCREAS SURGERY PROC UNLISTED  2014    ipp    VASCULAR SURGERY PROCEDURE UNLIST      vein stripped from left leg      Social History   Substance Use Topics    Smoking status: Former Smoker     Packs/day: 0.50     Years: 5.00     Quit date: 1/29/1977    Smokeless tobacco: Never Used      Comment: quit smoking cigarettes 30 yrs ago    Alcohol use No      Comment: none in 6-8 yrs      Family History   Problem Relation Age of Onset    Stroke Mother      ?  Stroke Father     Other Brother      Agent Orange    Stroke Brother     Anesth Problems Neg Hx       Prior to Admission medications    Medication Sig Start Date End Date Taking? Authorizing Provider   LIPASE/PROTEASE/AMYLASE (CREON PO) Take  by mouth. 38929 units po with a small meal.   Yes Historical Provider   LIPASE/PROTEASE/AMYLASE (CREON PO) Take  by mouth. 26712 units po with a snack. Yes Historical Provider   omeprazole (PRILOSEC) 20 mg capsule Take 20 mg by mouth daily. Yes Historical Provider   FLUoxetine (PROZAC) 20 mg tablet Take 20 mg by mouth daily. Yes Historical Provider   LIPASE/PROTEASE/AMYLASE (CREON PO) Take  by mouth. 70453 units po with large meal.   Yes Historical Provider     Allergies   Allergen Reactions    Aspirin Other (comments)     Does not take d/t chronic pancreatitis. Review of Systems:    A comprehensive review of systems was negative except for that written in the History of Present Illness.     Objective:     Visit Vitals    /80 (BP 1 Location: Right arm, BP Patient Position: Sitting)    Pulse 68    Temp 98.2 °F (36.8 °C) (Oral)    Resp 16    Ht 6' (1.829 m)    Wt 183 lb (83 kg)    SpO2 97%    BMI 24.82 kg/m2 Physical Exam:  General appearance: alert, cooperative, no distress, appears stated age  Head: Normocephalic, without obvious abnormality, atraumatic  Neurologic: Grossly normal  Abdomen: He has a 4 cm defect in the lower aspect of his midline incision. It is easily reducible and not tender. IMPRESSION: Incisional hernia. Assessment:       ICD-10-CM ICD-9-CM    1. Incisional hernia, without obstruction or gangrene K43.2 553.21        Plan:     I recommended continued use of the belt and to only deal with the hernia if it in and of itself becomes a problem. Written by geetha Reyes, as dictated by Ayana Corrigan MD.    Total face to face time with patient: 11 minutes. Greater than 50% of the time was spent in counseling. Signed By: Ayana Corrigan MD    August 22, 2018

## 2018-08-22 NOTE — MR AVS SNAPSHOT
[address] [facility] [address] [facility] [address] [address] [phone] Patient: Ryland Ricks MRN: [id number] DOF:[birth year] Visit Information Date & Time Provider Department Dept. Phone Encounter #  
 8/22/2018 10:20 AM Marlon Fonsecashellyjaziel [id number] [phone] [id number] Upcoming Health Maintenance Date Due Hepatitis C Screening 1955 DTaP/Tdap/Td series (1 - Tdap) 9/27/1976 FOBT Q 1 YEAR AGE 50-75 9/27/2005 ZOSTER VACCINE AGE 60> 7/27/2015 Influenza Age 5 to Adult 8/1/2018 Allergies as of 8/22/2018  Review Complete On: 8/22/2018 By: Geri Leslie MD  
  
 Severity Noted Reaction Type Reactions Aspirin  01/19/2016    Other (comments) Does not take d/t chronic pancreatitis. Current Immunizations  Reviewed on 2/16/2016 No immunizations on file. Not reviewed this visit You Were Diagnosed With   
  
 Codes Comments Incisional hernia, without obstruction or gangrene    -  Primary ICD-10-CM: M22.5 ICD-9-CM: 553.21 Vitals BP Pulse Temp Resp Height(growth percentile) Weight(growth percentile) 130/80 (BP 1 Location: Right arm, BP Patient Position: Sitting) 68 98.2 °F (36.8 °C) (Oral) 16 6' (1.829 m) 183 lb (83 kg) SpO2 BMI Smoking Status 97% 24.82 kg/m2 Former Smoker BMI and BSA Data Body Mass Index Body Surface Area  
 24.82 kg/m 2 2.05 m 2 Preferred Pharmacy Pharmacy Name Phone [facility] [phone] Your Updated Medication List  
  
   
This list is accurate as of 8/22/18 11:28 AM.  Always use your most recent med list.  
  
  
  
  
 * CREON PO Take  by mouth. 82996 units po with large meal.  
  
 * CREON PO Take  by mouth. 56596 units po with a small meal.  
  
 * CREON PO Take  by mouth. 89031 units po with a snack. FLUoxetine 20 mg tablet Commonly known as:  PROzac Take 20 mg by mouth daily. omeprazole 20 mg capsule Commonly known as:  PRILOSEC Take 20 mg by mouth daily. * Notice: This list has 3 medication(s) that are the same as other medications prescribed for you. Read the directions carefully, and ask your doctor or other care provider to review them with you. Introducing [facility] SERVICES! [facility] introduces g4interactive patient portal. Now you can access parts of your medical record, email your doctor's office, and request medication refills online. 1. In your internet browser, go to [email or web address] 2. Click on the First Time User? Click Here link in the Sign In box. You will see the New Member Sign Up page. 3. Enter your g4interactive Access Code exactly as it appears below. You will not need to use this code after youve completed the sign-up process. If you do not sign up before the expiration date, you must request a new code. · g4interactive Access Code: [email or web address] Expires: 11/6/2018  4:30 PM 
 
4. Enter the last four digits of your Social Security Number (xxxx) and Date of Birth (mm/dd/yyyy) as indicated and click Submit. You will be taken to the next sign-up page. 5. Create a g4interactive ID. This will be your g4interactive login ID and cannot be changed, so think of one that is secure and easy to remember. 6. Create a g4interactive password. You can change your password at any time. 7. Enter your Password Reset Question and Answer. This can be used at a later time if you forget your password. 8. Enter your e-mail address. You will receive e-mail notification when new information is available in [address] 9. Click Sign Up. You can now view and download portions of your medical record. 10. Click the Download Summary menu link to download a portable copy of your medical information.  
 
If you have questions, please visit the Frequently Asked Questions section of the [facility]. Remember, The [employer] is NOT to be used for urgent needs. For medical emergencies, dial 911. Now available from your iPhone and Android! Please provide this summary of care documentation to your next provider. Your primary care clinician is listed as Johan Haro. If you have any questions after today's visit, please call [phone].

## 2018-08-31 ENCOUNTER — HOSPITAL ENCOUNTER (EMERGENCY)
Age: 63
Discharge: HOME | End: 2018-08-31
Attending: STUDENT IN AN ORGANIZED HEALTH CARE EDUCATION/TRAINING PROGRAM
Payer: COMMERCIAL

## 2018-08-31 ENCOUNTER — APPOINTMENT (OUTPATIENT)
Dept: CT IMAGING | Age: 63
End: 2018-08-31
Attending: PHYSICIAN ASSISTANT
Payer: COMMERCIAL

## 2018-08-31 VITALS
OXYGEN SATURATION: 97 % | WEIGHT: 183 LBS | HEIGHT: 72 IN | TEMPERATURE: 98.6 F | HEART RATE: 66 BPM | RESPIRATION RATE: 16 BRPM | DIASTOLIC BLOOD PRESSURE: 74 MMHG | BODY MASS INDEX: 24.79 KG/M2 | SYSTOLIC BLOOD PRESSURE: 116 MMHG

## 2018-08-31 DIAGNOSIS — R10.13 ABDOMINAL PAIN, EPIGASTRIC: Primary | ICD-10-CM

## 2018-08-31 LAB
ALBUMIN SERPL-MCNC: 4.2 G/DL (ref 3.5–5)
ALBUMIN/GLOB SERPL: 0.9 {RATIO} (ref 1.1–2.2)
ALP SERPL-CCNC: 111 U/L (ref 45–117)
ALT SERPL-CCNC: 21 U/L (ref 12–78)
ANION GAP SERPL CALC-SCNC: 9 MMOL/L (ref 5–15)
AST SERPL-CCNC: 18 U/L (ref 15–37)
ATRIAL RATE: 64 BPM
BASOPHILS # BLD: 0 K/UL (ref 0–0.1)
BASOPHILS NFR BLD: 0 % (ref 0–1)
BILIRUB SERPL-MCNC: 0.6 MG/DL (ref 0.2–1)
BUN SERPL-MCNC: 9 MG/DL (ref 6–20)
BUN/CREAT SERPL: 10 (ref 12–20)
CALCIUM SERPL-MCNC: 8.8 MG/DL (ref 8.5–10.1)
CALCULATED P AXIS, ECG09: 57 DEGREES
CALCULATED R AXIS, ECG10: 63 DEGREES
CALCULATED T AXIS, ECG11: 58 DEGREES
CHLORIDE SERPL-SCNC: 101 MMOL/L (ref 97–108)
CO2 SERPL-SCNC: 27 MMOL/L (ref 21–32)
COMMENT, HOLDF: NORMAL
CREAT SERPL-MCNC: 0.94 MG/DL (ref 0.7–1.3)
DIAGNOSIS, 93000: NORMAL
DIFFERENTIAL METHOD BLD: ABNORMAL
EOSINOPHIL # BLD: 0.1 K/UL (ref 0–0.4)
EOSINOPHIL NFR BLD: 1 % (ref 0–7)
ERYTHROCYTE [DISTWIDTH] IN BLOOD BY AUTOMATED COUNT: 13.3 % (ref 11.5–14.5)
GLOBULIN SER CALC-MCNC: 4.5 G/DL (ref 2–4)
GLUCOSE SERPL-MCNC: 115 MG/DL (ref 65–100)
HCT VFR BLD AUTO: 42.7 % (ref 36.6–50.3)
HGB BLD-MCNC: 14 G/DL (ref 12.1–17)
IMM GRANULOCYTES # BLD: 0 K/UL (ref 0–0.04)
IMM GRANULOCYTES NFR BLD AUTO: 0 % (ref 0–0.5)
LIPASE SERPL-CCNC: 41 U/L (ref 73–393)
LYMPHOCYTES # BLD: 1.1 K/UL (ref 0.8–3.5)
LYMPHOCYTES NFR BLD: 12 % (ref 12–49)
MCH RBC QN AUTO: 29.9 PG (ref 26–34)
MCHC RBC AUTO-ENTMCNC: 32.8 G/DL (ref 30–36.5)
MCV RBC AUTO: 91.2 FL (ref 80–99)
MONOCYTES # BLD: 0.4 K/UL (ref 0–1)
MONOCYTES NFR BLD: 4 % (ref 5–13)
NEUTS SEG # BLD: 7.9 K/UL (ref 1.8–8)
NEUTS SEG NFR BLD: 84 % (ref 32–75)
NRBC # BLD: 0 K/UL (ref 0–0.01)
NRBC BLD-RTO: 0 PER 100 WBC
P-R INTERVAL, ECG05: 146 MS
PLATELET # BLD AUTO: 311 K/UL (ref 150–400)
PMV BLD AUTO: 9.7 FL (ref 8.9–12.9)
POTASSIUM SERPL-SCNC: 3.9 MMOL/L (ref 3.5–5.1)
PROT SERPL-MCNC: 8.7 G/DL (ref 6.4–8.2)
Q-T INTERVAL, ECG07: 434 MS
QRS DURATION, ECG06: 96 MS
QTC CALCULATION (BEZET), ECG08: 447 MS
RBC # BLD AUTO: 4.68 M/UL (ref 4.1–5.7)
SAMPLES BEING HELD,HOLD: NORMAL
SODIUM SERPL-SCNC: 137 MMOL/L (ref 136–145)
TROPONIN I SERPL-MCNC: <0.05 NG/ML
VENTRICULAR RATE, ECG03: 64 BPM
WBC # BLD AUTO: 9.5 K/UL (ref 4.1–11.1)

## 2018-08-31 PROCEDURE — C9113 INJ PANTOPRAZOLE SODIUM, VIA: HCPCS | Performed by: STUDENT IN AN ORGANIZED HEALTH CARE EDUCATION/TRAINING PROGRAM

## 2018-08-31 PROCEDURE — 96361 HYDRATE IV INFUSION ADD-ON: CPT

## 2018-08-31 PROCEDURE — 74177 CT ABD & PELVIS W/CONTRAST: CPT

## 2018-08-31 PROCEDURE — 84484 ASSAY OF TROPONIN QUANT: CPT | Performed by: PHYSICIAN ASSISTANT

## 2018-08-31 PROCEDURE — 74011250636 HC RX REV CODE- 250/636: Performed by: PHYSICIAN ASSISTANT

## 2018-08-31 PROCEDURE — 96374 THER/PROPH/DIAG INJ IV PUSH: CPT

## 2018-08-31 PROCEDURE — 80053 COMPREHEN METABOLIC PANEL: CPT | Performed by: PHYSICIAN ASSISTANT

## 2018-08-31 PROCEDURE — 74011250636 HC RX REV CODE- 250/636: Performed by: STUDENT IN AN ORGANIZED HEALTH CARE EDUCATION/TRAINING PROGRAM

## 2018-08-31 PROCEDURE — 93005 ELECTROCARDIOGRAM TRACING: CPT

## 2018-08-31 PROCEDURE — 36415 COLL VENOUS BLD VENIPUNCTURE: CPT | Performed by: PHYSICIAN ASSISTANT

## 2018-08-31 PROCEDURE — 74011000250 HC RX REV CODE- 250: Performed by: STUDENT IN AN ORGANIZED HEALTH CARE EDUCATION/TRAINING PROGRAM

## 2018-08-31 PROCEDURE — 74011000258 HC RX REV CODE- 258: Performed by: STUDENT IN AN ORGANIZED HEALTH CARE EDUCATION/TRAINING PROGRAM

## 2018-08-31 PROCEDURE — 85025 COMPLETE CBC W/AUTO DIFF WBC: CPT | Performed by: PHYSICIAN ASSISTANT

## 2018-08-31 PROCEDURE — 96375 TX/PRO/DX INJ NEW DRUG ADDON: CPT

## 2018-08-31 PROCEDURE — 83690 ASSAY OF LIPASE: CPT | Performed by: PHYSICIAN ASSISTANT

## 2018-08-31 PROCEDURE — 74011636320 HC RX REV CODE- 636/320: Performed by: STUDENT IN AN ORGANIZED HEALTH CARE EDUCATION/TRAINING PROGRAM

## 2018-08-31 PROCEDURE — 99284 EMERGENCY DEPT VISIT MOD MDM: CPT

## 2018-08-31 RX ORDER — ONDANSETRON 2 MG/ML
4 INJECTION INTRAMUSCULAR; INTRAVENOUS
Status: COMPLETED | OUTPATIENT
Start: 2018-08-31 | End: 2018-08-31

## 2018-08-31 RX ORDER — KETOROLAC TROMETHAMINE 30 MG/ML
30 INJECTION, SOLUTION INTRAMUSCULAR; INTRAVENOUS
Status: COMPLETED | OUTPATIENT
Start: 2018-08-31 | End: 2018-08-31

## 2018-08-31 RX ORDER — HYDROMORPHONE HYDROCHLORIDE 2 MG/ML
2 INJECTION, SOLUTION INTRAMUSCULAR; INTRAVENOUS; SUBCUTANEOUS ONCE
Status: COMPLETED | OUTPATIENT
Start: 2018-08-31 | End: 2018-08-31

## 2018-08-31 RX ORDER — SODIUM CHLORIDE 0.9 % (FLUSH) 0.9 %
10 SYRINGE (ML) INJECTION
Status: COMPLETED | OUTPATIENT
Start: 2018-08-31 | End: 2018-08-31

## 2018-08-31 RX ORDER — HYDROMORPHONE HYDROCHLORIDE 2 MG/1
2 TABLET ORAL
Qty: 20 TAB | Refills: 0 | Status: SHIPPED | OUTPATIENT
Start: 2018-08-31 | End: 2018-09-03

## 2018-08-31 RX ADMIN — SODIUM CHLORIDE 100 ML: 900 INJECTION, SOLUTION INTRAVENOUS at 19:16

## 2018-08-31 RX ADMIN — HYDROMORPHONE HYDROCHLORIDE 2 MG: 2 INJECTION INTRAMUSCULAR; INTRAVENOUS; SUBCUTANEOUS at 18:48

## 2018-08-31 RX ADMIN — SODIUM CHLORIDE 1000 ML: 900 INJECTION, SOLUTION INTRAVENOUS at 18:19

## 2018-08-31 RX ADMIN — Medication 10 ML: at 19:16

## 2018-08-31 RX ADMIN — SODIUM CHLORIDE 40 MG: 9 INJECTION, SOLUTION INTRAMUSCULAR; INTRAVENOUS; SUBCUTANEOUS at 20:04

## 2018-08-31 RX ADMIN — KETOROLAC TROMETHAMINE 30 MG: 30 INJECTION, SOLUTION INTRAMUSCULAR at 18:16

## 2018-08-31 RX ADMIN — IOPAMIDOL 100 ML: 755 INJECTION, SOLUTION INTRAVENOUS at 19:16

## 2018-08-31 RX ADMIN — ONDANSETRON 4 MG: 2 INJECTION INTRAMUSCULAR; INTRAVENOUS at 18:16

## 2018-08-31 NOTE — ED NOTES

## 2018-08-31 NOTE — ED TRIAGE NOTES
Pt reports having mid abdominal pain that began a week ago. Pt states the pain was on and off in the beginning and has progressed to constant. Pt describes the pain as sharp, stabbing and constant. Pt has history of biliary bypass 3 years ago. Pt also reports vomiting earlier today.

## 2018-08-31 NOTE — ED PROVIDER NOTES
HPI Comments: 58 y.o. male with past medical history significant for pancreatitis, pancreatic cyst, GERD,Thompson's esophagus, and bile duct stricture, who presents ambulatory with wife to the ED with cc of abdominal pain. Pt reports epigastric abdominal pain starting 6 days ago that resolved after a couple of days. He states the pain returned with increased intensity last night. Per pt, the pain radiates straight through to his back. He reports associated nausea, vomiting, and decreased bowel movements. Pt reports history of pancreatitis and states these symptoms feel like a recurring flare. He states he called his GI's office today, but was unable to be seen today, so he came to the ED. Pt also notes history of hernia. Of note, pt reports he took ASA for 2 days starting 6 days ago to treat a \"left blockage,\" which was the day of onset of this pain. There are no other acute medical concerns at this time. Social Hx: former Tobacco use, denies EtOH use, denies Illicit Drug use PCP: Tiffany Lopes MD 
 
Note written by Leila Asif, as dictated by Val Ring MD 6:27 PM  
 
 
The history is provided by the patient. No  was used. Past Medical History:  
Diagnosis Date  Adverse effect of anesthesia   
 woke up during ERCP - gasping  Bile duct stricture 1/27/2016  Chronic pain   
 chronic pancreatitis  GERD (gastroesophageal reflux disease) Thompson's Esophagus  Ill-defined condition   
 blood infection x 4 per pt  Ill-defined condition   
 pancreas cyst  
 Other ill-defined conditions(799.89)   
 insomnia  Pancreatic duct obstruction 2/8/2017  Pancreatitis  Psychiatric disorder Depression  Unspecified adverse effect of anesthesia   
 woke up during ERCP Past Surgical History:  
Procedure Laterality Date  HX GI    
 EUS/EGD  HX HERNIA REPAIR    
 age 2 yrs left inguinal  
 HX ORTHOPAEDIC    
 ganglion cyst removed left wrist x 2  
 HX ORTHOPAEDIC    
 left knee fracture 2013-1/30  HX ORTHOPAEDIC    
 meniscus repair (2) on left knee  HX OTHER SURGICAL    
 ERCPS - stents and removal since 1995 last 8/2014  HX OTHER SURGICAL    
 ERCP with stent 4/2015  HX OTHER SURGICAL  2/09/16 LAPAROTOMY EXPLORATORY; SMALL BOWEL RESECTION  
 HX OTHER SURGICAL 1. Exploratory laparotomy 2. Resection of the necrotic proximal small bowel and oversewing of enteroenterotomy closure - [facility]-Dr. Bao Ambrocio  HX TONSILLECTOMY [facility] UNLISTED  2014 ipp  VASCULAR SURGERY PROCEDURE UNLIST    
 vein stripped from left leg Family History:  
Problem Relation Age of Onset  Stroke Mother ?  Stroke Father  Other Brother Agent Wil Bello  Stroke Brother  Anesth Problems Neg Hx Social History Social History  Marital status:  Spouse name: N/A  
 Number of children: N/A  
 Years of education: N/A Occupational History  Not on file. Social History Main Topics  Smoking status: Former Smoker Packs/day: 0.50 Years: 5.00 Quit date: 1/29/1977  Smokeless tobacco: Never Used Comment: quit smoking cigarettes 30 yrs ago  Alcohol use No  
   Comment: none in 6-8 yrs  Drug use: No  
 Sexual activity: Not on file Other Topics Concern  Not on file Social History Narrative ALLERGIES: Aspirin Review of Systems Constitutional: Negative for chills, diaphoresis, fatigue and fever. HENT: Negative for congestion, rhinorrhea, sinus pressure, sore throat, trouble swallowing and voice change. Eyes: Negative for photophobia and visual disturbance. Respiratory: Negative for cough, chest tightness and shortness of breath. Cardiovascular: Negative for chest pain, palpitations and leg swelling. Gastrointestinal: Positive for abdominal pain, nausea and vomiting. Negative for blood in stool, constipation and diarrhea.  
     + decreased BMs Musculoskeletal: Negative for arthralgias, myalgias and neck pain. Neurological: Negative for dizziness, weakness, light-headedness, numbness and headaches. All other systems reviewed and are negative. Vitals:  
 08/31/18 1743 BP: (!) 178/91 Pulse: 75 Resp: 18 Temp: 98.3 °F (36.8 °C) SpO2: 100% Weight: 83 kg (183 lb) Height: 6' (1.829 m) Physical Exam  
Constitutional: He is oriented to person, place, and time. He appears well-developed and well-nourished. No distress. Uncomfortable appearing HENT:  
Head: Normocephalic and atraumatic. Nose: Nose normal.  
Mouth/Throat: Oropharynx is clear and moist. No oropharyngeal exudate. Eyes: Conjunctivae and EOM are normal. Right eye exhibits no discharge. Left eye exhibits no discharge. No scleral icterus. Neck: Normal range of motion. Neck supple. No JVD present. No tracheal deviation present. No thyromegaly present. Cardiovascular: Normal rate, regular rhythm, normal heart sounds and intact distal pulses. Exam reveals no gallop and no friction rub. No murmur heard. Pulmonary/Chest: Effort normal and breath sounds normal. No stridor. No respiratory distress. He has no wheezes. He has no rales. He exhibits no tenderness. Abdominal: Bowel sounds are normal. He exhibits no distension and no mass. There is tenderness in the epigastric area. There is no rebound. Vertical incision non-tender Musculoskeletal: Normal range of motion. He exhibits no edema or tenderness. Lymphadenopathy:  
  He has no cervical adenopathy. Neurological: He is alert and oriented to person, place, and time. No cranial nerve deficit. Coordination normal.  
Skin: Skin is warm and dry. No rash noted. He is not diaphoretic. No erythema. No pallor. Psychiatric: He has a normal mood and affect.  His behavior is normal. Judgment and thought content normal.  
 Nursing note and vitals reviewed. Note written by Leila Angel, as dictated by Rekha Quiñones MD 6:27 PM  
 
MDM Number of Diagnoses or Management Options Abdominal pain, epigastric:  
Diagnosis management comments: Abdominal pain, pancreatitis, SBO.  57 y/o male with hx of pancreatitis. Plan:  Cbc, cmp, lipase, ecg, ce, 1 L NS, zofran, toradol. Reassessment:  Pain not controlled will give 2 mg dilaudid IV, CT abd/pelv. Amount and/or Complexity of Data Reviewed Clinical lab tests: ordered and reviewed Tests in the radiology section of CPT®: reviewed and ordered Review and summarize past medical records: yes Independent visualization of images, tracings, or specimens: yes Risk of Complications, Morbidity, and/or Mortality Presenting problems: moderate Diagnostic procedures: moderate Management options: moderate Patient Progress Patient progress: resolved ED Course Procedures 9:02 PM 
Discussed incidental finding on abdomen CT of a hypodense area in the inferior medial section of the head of the pancreas. Pt will need to f/u with GI for either further imaging or biopsy. 11:08 PM 
The patient has been reevaluated. The patient is ready for discharge. The patient's signs, symptoms, diagnosis, and discharge instructions have been discussed and the patient/ family has conveyed their understanding. The patient is to follow up as recommended or return to the ED should their symptoms worsen. Plan has been discussed and the patient is in agreement. LABORATORY TESTS: 
Recent Results (from the past 12 hour(s)) CBC WITH AUTOMATED DIFF Collection Time: 08/31/18  6:03 PM  
Result Value Ref Range WBC 9.5 4.1 - 11.1 K/uL  
 RBC 4.68 4.10 - 5.70 M/uL  
 HGB 14.0 12.1 - 17.0 g/dL HCT 42.7 36.6 - 50.3 % MCV 91.2 80.0 - 99.0 FL  
 MCH 29.9 26.0 - 34.0 PG  
 MCHC 32.8 30.0 - 36.5 g/dL  
 RDW 13.3 11.5 - 14.5 % PLATELET 845 950 - 193 K/uL MPV 9.7 8.9 - 12.9 FL  
 NRBC 0.0 0  WBC ABSOLUTE NRBC 0.00 0.00 - 0.01 K/uL NEUTROPHILS 84 (H) 32 - 75 % LYMPHOCYTES 12 12 - 49 % MONOCYTES 4 (L) 5 - 13 % EOSINOPHILS 1 0 - 7 % BASOPHILS 0 0 - 1 % IMMATURE GRANULOCYTES 0 0.0 - 0.5 % ABS. NEUTROPHILS 7.9 1.8 - 8.0 K/UL  
 ABS. LYMPHOCYTES 1.1 0.8 - 3.5 K/UL  
 ABS. MONOCYTES 0.4 0.0 - 1.0 K/UL  
 ABS. EOSINOPHILS 0.1 0.0 - 0.4 K/UL  
 ABS. BASOPHILS 0.0 0.0 - 0.1 K/UL  
 ABS. IMM. GRANS. 0.0 0.00 - 0.04 K/UL  
 DF AUTOMATED METABOLIC PANEL, COMPREHENSIVE Collection Time: 08/31/18  6:03 PM  
Result Value Ref Range Sodium 137 136 - 145 mmol/L Potassium 3.9 3.5 - 5.1 mmol/L Chloride 101 97 - 108 mmol/L  
 CO2 27 21 - 32 mmol/L Anion gap 9 5 - 15 mmol/L Glucose 115 (H) 65 - 100 mg/dL BUN 9 6 - 20 MG/DL Creatinine 0.94 0.70 - 1.30 MG/DL  
 BUN/Creatinine ratio 10 (L) 12 - 20 GFR est AA >60 >60 ml/min/1.73m2 GFR est non-AA >60 >60 ml/min/1.73m2 Calcium 8.8 8.5 - 10.1 MG/DL Bilirubin, total 0.6 0.2 - 1.0 MG/DL  
 ALT (SGPT) 21 12 - 78 U/L  
 AST (SGOT) 18 15 - 37 U/L Alk. phosphatase 111 45 - 117 U/L Protein, total 8.7 (H) 6.4 - 8.2 g/dL Albumin 4.2 3.5 - 5.0 g/dL Globulin 4.5 (H) 2.0 - 4.0 g/dL A-G Ratio 0.9 (L) 1.1 - 2.2 LIPASE Collection Time: 08/31/18  6:03 PM  
Result Value Ref Range Lipase 41 (L) 73 - 393 U/L  
SAMPLES BEING HELD Collection Time: 08/31/18  6:03 PM  
Result Value Ref Range SAMPLES BEING HELD RED,LINDA   
 COMMENT Add-on orders for these samples will be processed based on acceptable specimen integrity and analyte stability, which may vary by analyte. TROPONIN I Collection Time: 08/31/18  6:03 PM  
Result Value Ref Range Troponin-I, Qt. <0.05 <0.05 ng/mL EKG, 12 LEAD, INITIAL Collection Time: 08/31/18  6:09 PM  
Result Value Ref Range  Ventricular Rate 64 BPM  
 Atrial Rate 64 BPM  
 P-R Interval 146 ms  
 QRS Duration 96 ms  
 Q-T Interval 434 ms QTC Calculation (Bezet) 447 ms Calculated P Axis 57 degrees Calculated R Axis 63 degrees Calculated T Axis 58 degrees Diagnosis Normal sinus rhythm Voltage criteria for left ventricular hypertrophy When compared with ECG of 01-FEB-2016 22:42, Nonspecific T wave abnormality no longer evident in Inferior leads T wave amplitude has increased in Anterior leads Confirmed by Dandre Matute MD, Elmer Ellis ([id number]) on 8/31/2018 7:37:18 PM 
  
 
 
IMAGING RESULTS: 
CT ABD PELV W CONT Final Result Ct Abd Pelv W Cont Result Date: 8/31/2018 EXAM:  CT ABD PELV W CONT INDICATION: epigastric and mid abdominal pain for one week. Vomiting today. History of biliary bypass 3 years ago. COMPARISON: 12/22/2016 CONTRAST:  100 mL of Isovue-370. TECHNIQUE: Following the uneventful intravenous administration of contrast, thin axial images were obtained through the abdomen and pelvis. Coronal and sagittal reconstructions were generated. Oral contrast was not administered. CT dose reduction was achieved through use of a standardized protocol tailored for this examination and automatic exposure control for dose modulation. FINDINGS: LUNG BASES: Clear. INCIDENTALLY IMAGED HEART AND MEDIASTINUM: Unremarkable. LIVER: No mass or biliary dilatation. Intrahepatic pneumobilia. GALLBLADDER: Prior cholecystectomy. SPLEEN: No mass. PANCREAS: Persistent pancreatic atrophy and pancreatic ductal dilatation to 1 cm. Punctate calcifications in the pancreatic head are unchanged. New 1.5 x 1.4 x 1.4 cm hypodense structure in the inferior medial pancreatic head (image axial 38 and coronal 45). ADRENALS: Unremarkable. KIDNEYS: No mass, calculus, or hydronephrosis. STOMACH: Unremarkable. SMALL BOWEL: Small bowel ventral hernia with neck measurement of 4.4 cm. COLON: No dilatation or wall thickening. APPENDIX: Unremarkable. Normal appendix PERITONEUM: No ascites or pneumoperitoneum. RETROPERITONEUM: No lymphadenopathy or aortic aneurysm. REPRODUCTIVE ORGANS: Small prostate URINARY BLADDER: No mass or calculus. BONES: No destructive bone lesion. Disc bulge at L2-3. ADDITIONAL COMMENTS: N/A IMPRESSION: Chronic calcific pancreatitis with gland atrophy and pancreatic ductal dilatation. New hypodensity in the pancreatic head. 3 phase CT recommended electively to determine if this represents a complex cyst or early cystic mass. Unchanged pneumobilia, ventral hernia MEDICATIONS GIVEN: 
Medications  
sodium chloride 0.9 % bolus infusion 1,000 mL (0 mL IntraVENous IV Completed 8/31/18 1919)  
ondansetron (ZOFRAN) injection 4 mg (4 mg IntraVENous Given 8/31/18 1816)  
ketorolac (TORADOL) injection 30 mg (30 mg IntraVENous Given 8/31/18 1816)  
sodium chloride 0.9 % bolus infusion 100 mL (0 mL IntraVENous IV Completed 8/31/18 1917) iopamidol (ISOVUE-370) 76 % injection 100 mL (100 mL IntraVENous Given 8/31/18 1916)  
sodium chloride (NS) flush 10 mL (10 mL IntraVENous Given 8/31/18 1916) HYDROmorphone (DILAUDID) injection 2 mg (2 mg IntraVENous Given 8/31/18 1848) pantoprazole (PROTONIX) 40 mg in sodium chloride 0.9% 10 mL injection (40 mg IntraVENous Given 8/31/18 2004) IMPRESSION: 
1. Abdominal pain, epigastric PLAN: 
1. Discharge Medication List as of 8/31/2018  9:00 PM  
  
START taking these medications Details HYDROmorphone (DILAUDID) 2 mg tablet Take 1 Tab by mouth every six (6) hours as needed for Pain for up to 3 days. Max Daily Amount: 8 mg., Print, Disp-20 Tab, R-0  
  
  
CONTINUE these medications which have NOT CHANGED Details  
!! LIPASE/PROTEASE/AMYLASE (CREON PO) Take  by mouth. 43728 units po with a small meal., Historical Med  
  
!! LIPASE/PROTEASE/AMYLASE (CREON PO) Take  by mouth. 00964 units po with a snack., Historical Med  
  
omeprazole (PRILOSEC) 20 mg capsule Take 20 mg by mouth daily. , Historical Med  
  
 FLUoxetine (PROZAC) 20 mg tablet Take 20 mg by mouth daily. , Historical Med  
  
!! LIPASE/PROTEASE/AMYLASE (CREON PO) Take  by mouth. 23479 units po with large meal., Historical Med  
  
 !! - Potential duplicate medications found. Please discuss with provider. 2.  
Follow-up Information Follow up With Details Comments Contact Info Jovi Rooney MD  If symptoms worsen [address] [facility] [address] 
[phone] [facility] [place] [facility]  If symptoms worsen [address] [id number] 
[phone] Rexann Lennox, MD Schedule an appointment as soon as possible for a visit  [address] [place] [address] [address] 
[phone] Return to ED for new or worsening symptoms Priya Stubbs MD

## 2018-09-01 NOTE — DISCHARGE INSTRUCTIONS

## 2018-10-10 ENCOUNTER — ANESTHESIA EVENT (OUTPATIENT)
Dept: ENDOSCOPY | Age: 63
End: 2018-10-10
Payer: COMMERCIAL

## 2018-10-10 ENCOUNTER — APPOINTMENT (OUTPATIENT)
Dept: ULTRASOUND IMAGING | Age: 63
End: 2018-10-10
Attending: INTERNAL MEDICINE
Payer: COMMERCIAL

## 2018-10-10 ENCOUNTER — HOSPITAL ENCOUNTER (OUTPATIENT)
Age: 63
Setting detail: OUTPATIENT SURGERY
Discharge: HOME | End: 2018-10-10
Attending: INTERNAL MEDICINE | Admitting: INTERNAL MEDICINE
Payer: COMMERCIAL

## 2018-10-10 ENCOUNTER — ANESTHESIA (OUTPATIENT)
Dept: ENDOSCOPY | Age: 63
End: 2018-10-10
Payer: COMMERCIAL

## 2018-10-10 VITALS
TEMPERATURE: 97.7 F | RESPIRATION RATE: 14 BRPM | SYSTOLIC BLOOD PRESSURE: 114 MMHG | OXYGEN SATURATION: 96 % | WEIGHT: 183 LBS | HEART RATE: 65 BPM | BODY MASS INDEX: 24.82 KG/M2 | DIASTOLIC BLOOD PRESSURE: 67 MMHG

## 2018-10-10 PROCEDURE — 74011250636 HC RX REV CODE- 250/636

## 2018-10-10 PROCEDURE — 76060000032 HC ANESTHESIA 0.5 TO 1 HR: Performed by: INTERNAL MEDICINE

## 2018-10-10 PROCEDURE — 76040000007: Performed by: INTERNAL MEDICINE

## 2018-10-10 PROCEDURE — 74011250636 HC RX REV CODE- 250/636: Performed by: INTERNAL MEDICINE

## 2018-10-10 RX ORDER — ATROPINE SULFATE 0.1 MG/ML
0.5 INJECTION INTRAVENOUS
Status: DISCONTINUED | OUTPATIENT
Start: 2018-10-10 | End: 2018-10-10 | Stop reason: HOSPADM

## 2018-10-10 RX ORDER — MIDAZOLAM HYDROCHLORIDE 1 MG/ML
.25-1 INJECTION, SOLUTION INTRAMUSCULAR; INTRAVENOUS
Status: DISCONTINUED | OUTPATIENT
Start: 2018-10-10 | End: 2018-10-10 | Stop reason: HOSPADM

## 2018-10-10 RX ORDER — FENTANYL CITRATE 50 UG/ML
100 INJECTION, SOLUTION INTRAMUSCULAR; INTRAVENOUS
Status: DISCONTINUED | OUTPATIENT
Start: 2018-10-10 | End: 2018-10-10 | Stop reason: HOSPADM

## 2018-10-10 RX ORDER — DEXTROMETHORPHAN/PSEUDOEPHED 2.5-7.5/.8
1.2 DROPS ORAL
Status: DISCONTINUED | OUTPATIENT
Start: 2018-10-10 | End: 2018-10-10 | Stop reason: HOSPADM

## 2018-10-10 RX ORDER — SODIUM CHLORIDE 0.9 % (FLUSH) 0.9 %
5-10 SYRINGE (ML) INJECTION EVERY 8 HOURS
Status: DISCONTINUED | OUTPATIENT
Start: 2018-10-10 | End: 2018-10-10 | Stop reason: HOSPADM

## 2018-10-10 RX ORDER — EPINEPHRINE 0.1 MG/ML
1 INJECTION INTRACARDIAC; INTRAVENOUS
Status: DISCONTINUED | OUTPATIENT
Start: 2018-10-10 | End: 2018-10-10 | Stop reason: HOSPADM

## 2018-10-10 RX ORDER — SODIUM CHLORIDE 9 MG/ML
INJECTION, SOLUTION INTRAVENOUS
Status: DISCONTINUED | OUTPATIENT
Start: 2018-10-10 | End: 2018-10-10 | Stop reason: HOSPADM

## 2018-10-10 RX ORDER — SODIUM CHLORIDE 0.9 % (FLUSH) 0.9 %
5-10 SYRINGE (ML) INJECTION AS NEEDED
Status: DISCONTINUED | OUTPATIENT
Start: 2018-10-10 | End: 2018-10-10 | Stop reason: HOSPADM

## 2018-10-10 RX ORDER — LIDOCAINE HYDROCHLORIDE 20 MG/ML
INJECTION, SOLUTION EPIDURAL; INFILTRATION; INTRACAUDAL; PERINEURAL AS NEEDED
Status: DISCONTINUED | OUTPATIENT
Start: 2018-10-10 | End: 2018-10-10 | Stop reason: HOSPADM

## 2018-10-10 RX ORDER — SODIUM CHLORIDE 9 MG/ML
50 INJECTION, SOLUTION INTRAVENOUS CONTINUOUS
Status: DISCONTINUED | OUTPATIENT
Start: 2018-10-10 | End: 2018-10-10 | Stop reason: HOSPADM

## 2018-10-10 RX ORDER — NALOXONE HYDROCHLORIDE 0.4 MG/ML
0.4 INJECTION, SOLUTION INTRAMUSCULAR; INTRAVENOUS; SUBCUTANEOUS
Status: DISCONTINUED | OUTPATIENT
Start: 2018-10-10 | End: 2018-10-10 | Stop reason: HOSPADM

## 2018-10-10 RX ORDER — PROPOFOL 10 MG/ML
INJECTION, EMULSION INTRAVENOUS AS NEEDED
Status: DISCONTINUED | OUTPATIENT
Start: 2018-10-10 | End: 2018-10-10 | Stop reason: HOSPADM

## 2018-10-10 RX ORDER — FLUMAZENIL 0.1 MG/ML
0.2 INJECTION INTRAVENOUS
Status: DISCONTINUED | OUTPATIENT
Start: 2018-10-10 | End: 2018-10-10 | Stop reason: HOSPADM

## 2018-10-10 RX ORDER — ASPIRIN 81 MG/1
TABLET ORAL DAILY
COMMUNITY

## 2018-10-10 RX ADMIN — SODIUM CHLORIDE: 9 INJECTION, SOLUTION INTRAVENOUS at 10:09

## 2018-10-10 RX ADMIN — PROPOFOL 50 MG: 10 INJECTION, EMULSION INTRAVENOUS at 10:18

## 2018-10-10 RX ADMIN — PROPOFOL 50 MG: 10 INJECTION, EMULSION INTRAVENOUS at 10:15

## 2018-10-10 RX ADMIN — PROPOFOL 50 MG: 10 INJECTION, EMULSION INTRAVENOUS at 10:26

## 2018-10-10 RX ADMIN — PROPOFOL 50 MG: 10 INJECTION, EMULSION INTRAVENOUS at 10:24

## 2018-10-10 RX ADMIN — LIDOCAINE HYDROCHLORIDE 60 MG: 20 INJECTION, SOLUTION EPIDURAL; INFILTRATION; INTRACAUDAL; PERINEURAL at 10:16

## 2018-10-10 RX ADMIN — PROPOFOL 50 MG: 10 INJECTION, EMULSION INTRAVENOUS at 10:28

## 2018-10-10 RX ADMIN — PROPOFOL 50 MG: 10 INJECTION, EMULSION INTRAVENOUS at 10:21

## 2018-10-10 NOTE — ROUTINE PROCESS
Zaynab Loss  1955  [id number]    Situation:  Verbal report received from: Kenyatta Pennington RN  Procedure: Procedure(s):  ENDOSCOPIC ULTRASOUND (EUS)  ESOPHAGOGASTRODUODENOSCOPY (EGD)    Background:    Preoperative diagnosis: Pancreatic Mass  Postoperative diagnosis: Second portion, duodenal stenosis, chronic pancreatitis    :  Dr. Sea Beth  Assistant(s): Endoscopy Technician-1: Rakel Montalvo  Endoscopy RN-1: Estela Finch RN    Specimens: * No specimens in log *  H. Pylori  no    Assessment:  Intra-procedure medications     Anesthesia gave intra-procedure sedation and medications, see anesthesia flow sheet yes    Intravenous fluids: NS@ KVO     Vital signs stable     Abdominal assessment: round and soft     Recommendation:  Discharge patient per MD order.   Family  Permission to share finding with family or friend yes

## 2018-10-10 NOTE — IP AVS SNAPSHOT
[address] 
[phone] Patient: Reina Orr. MRN: [id number] KTY:4/89/4730 About your hospitalization You were admitted on:  October 10, 2018 You last received care in the:  [facility] ENDOSCOPY You were discharged on:  October 10, 2018 Why you were hospitalized Your primary diagnosis was:  Not on File Follow-up Information None Discharge Orders None A check angelica indicates which time of day the medication should be taken. My Medications CONTINUE taking these medications Instructions Each Dose to Equal  
 Morning Noon Evening Bedtime  
 aspirin delayed-release 81 mg tablet Your last dose was: Your next dose is: Take  by mouth daily. * CREON PO Your last dose was: Your next dose is: Take  by mouth. 56945 units po with large meal.  
     
   
   
   
  
 * CREON PO Your last dose was: Your next dose is: Take  by mouth. 50883 units po with a small meal.  
     
   
   
   
  
 * CREON PO Your last dose was: Your next dose is: Take  by mouth. 82466 units po with a snack. FLUoxetine 20 mg tablet Commonly known as:  PROzac Your last dose was: Your next dose is: Take 20 mg by mouth daily. 20 mg  
    
   
   
   
  
 omeprazole 20 mg capsule Commonly known as:  PRILOSEC Your last dose was: Your next dose is: Take 20 mg by mouth daily. 20 mg  
    
   
   
   
  
 * Notice: This list has 3 medication(s) that are the same as other medications prescribed for you. Read the directions carefully, and ask your doctor or other care provider to review them with you. Discharge Instructions [facility] 
[address] [facility] [address] [place], [address] 
[phone] DISCHARGE INSTRUCTIONS Lionel Fair. 
[id number] 
1955 DISCOMFORT: 
Sore throat-  warm salt water gargle 
redness at IV site- apply warm compress to area; if redness or soreness persist- contact your physician Gaseous discomfort- walking, belching will help relieve any discomfort You may not operate a vehicle for 12 hours You may not engage in an occupation involving machinery or appliances for rest of today You may not drink alcoholic beverages for at least 12 hours Avoid making any critical decisions for at least 24 hour DIET You may eat and drink after you leave. You may resume your regular diet  however -  remember your colon is empty and a heavy meal will produce gas. Avoid these foods:  vegetables, fried / greasy foods, carbonated drinks ACTIVITY You may resume your normal daily activities Spend the remainder of the day resting -  avoid any strenuous activity. CALL M.D. ANY SIGN OF Increasing pain, nausea, vomiting Abdominal distension (swelling) New increased bleeding (oral or rectal) Fever (chills) Pain in chest area Bloody discharge from nose or mouth Shortness of breath Follow-up Instructions: 
 Call Dr. Eze Martínez for any questions or problems. If we took a biopsy please call the office within 2 weeks to discuss your                             pathology results. Telephone # [phone] Continue same medications. Introducing [facility]! [facility] introduces Lolabox patient portal. Now you can access parts of your medical record, email your doctor's office, and request medication refills online. 1. In your internet browser, go to [email or web address] 2. Click on the First Time User? Click Here link in the Sign In box. You will see the New Member Sign Up page. 3. Enter your Lolabox Access Code exactly as it appears below.  You will not need to use this code after youve completed the sign-up process. If you do not sign up before the expiration date, you must request a new code. · Choozle Access Code: [email or web address] Expires: 11/6/2018  4:30 PM 
 
4. Enter the last four digits of your Social Security Number (xxxx) and Date of Birth (mm/dd/yyyy) as indicated and click Submit. You will be taken to the next sign-up page. 5. Create a Choozle ID. This will be your Choozle login ID and cannot be changed, so think of one that is secure and easy to remember. 6. Create a Choozle password. You can change your password at any time. 7. Enter your Password Reset Question and Answer. This can be used at a later time if you forget your password. 8. Enter your e-mail address. You will receive e-mail notification when new information is available in [address] 9. Click Sign Up. You can now view and download portions of your medical record. 10. Click the Download Summary menu link to download a portable copy of your medical information. If you have questions, please visit the Frequently Asked Questions section of the Choozle website. Remember, Choozle is NOT to be used for urgent needs. For medical emergencies, dial 911. Now available from your iPhone and Android! Introducing Mykel Moon As a [employer] patient, I wanted to make you aware of our electronic visit tool called Mykel Moon. [employer] 24/7 allows you to connect within minutes with a medical provider 24 hours a day, seven days a week via a mobile device or tablet or logging into a secure website from your computer. You can access Mykel Moon from anywhere in the [place].  
 
A virtual visit might be right for you when you have a simple condition and feel like you just dont want to get out of bed, or cant get away from work for an appointment, when your regular [employer] provider is not available (evenings, weekends or holidays), or when youre out of town and need minor care. Electronic visits cost only $49 and if the [facility] 24/Scaleform provider determines a prescription is needed to treat your condition, one can be electronically transmitted to a nearby pharmacy*. Please take a moment to enroll today if you have not already done so. The enrollment process is free and takes just a few minutes. To enroll, please download the Cequens lilliam to your tablet or phone, or visit [email or web address] to enroll on your computer. And, as an [address] [place] [address] patient with a [facility] account, the results of your visits will be scanned into your electronic medical record and your primary care provider will be able to view the scanned results. We urge you to continue to see your regular [facility] provider for your ongoing medical care. And while your primary care provider may not be the one available when you seek a [facility] virtual visit, the peace of mind you get from getting a real diagnosis real time can be priceless. For more information on [employer], view our Frequently Asked Questions (FAQs) at [email or web address]. Sincerely, 
 
Conner Steve MD 
Chief Medical Officer [address] *:  certain medications cannot be prescribed via [facility] Unresulted Labs-Please follow up with your PCP about these lab tests Order Current Status US ENDO ENDOSCOPIC ULTRASOUND In process Providers Seen During Your Hospitalization Provider Specialty Primary office phone Gideon Thurman MD Gastroenterology [phone] Your Primary Care Physician (PCP) Primary Care Physician Office Phone Office Fax Elene Gitelman [phone] You are allergic to the following Allergen Reactions Aspirin Other (comments) Does not take d/t chronic pancreatitis. Pt has begun to take  81 mg ASA due to 'blockages' in his heart. Recent Documentation Weight BMI Smoking Status 83 kg 24.82 kg/m2 Former Smoker Emergency Contacts Name Discharge Info Relation Home Work Mobile Shelby Bustamante S DISCHARGE CAREGIVER [3] Spouse [3]   [phone] Veronica Bustamante  Spouse [3] [phone] Patient Belongings The following personal items are in your possession at time of discharge: 
  Dental Appliances: None Please provide this summary of care documentation to your next provider. Signatures-by signing, you are acknowledging that this After Visit Summary has been reviewed with you and you have received a copy. Patient Signature:  ____________________________________________________________ Date:  ____________________________________________________________  
  
Oddis Armond Provider Signature:  ____________________________________________________________ Date:  ____________________________________________________________

## 2018-10-10 NOTE — PERIOP NOTES
Patient has been evaluated by anesthesia pre-procedure. Patient alert and oriented. Vital signs will not be charted by the Endoscopy nurse. All vitals, airway, and loc are monitored by anesthesia staff throughout procedure. .Endoscopes were pre-cleaned at bedside immediately following procedure by AB.

## 2018-10-10 NOTE — PROCEDURES
[address]  [address] [place]  [place], [address]  [phone]                                Endoscopic Ultrasound    NAME:  Saud Red. :   1955   MRN:   [id number]       Date/Time:  10/10/2018   Procedure Type: Linear Upper EUS       Indications: Pancreatitis - chronic    Pre-operative Diagnosis: see indication above    Post-operative Diagnosis:  See findings below    : King Daily MD    Referring Provider: -George Garaz MD    Anethesia/Sedation:  MAC anesthesia      Procedure Details   After infom consent was obtained for the procedure, with all risks and benefits of procedure explained the patient was taken to the endoscopy suite and placed in the left lateral decubitus position. Following sequential administration of sedation as per above, the linear echoendoscope was inserted into the mouth and advanced under direct vision to second portion of the duodenum. A careful inspection was made as the gastroscope was withdrawn, including a retroflexed view of the proximal stomach; findings and interventions are described below. Findings:     Endoscopic:   Esophagus:normal   Stomach: normal    Duodenum/jejunum: Mild stenosis of the lumen was seen in the ampullary region. No mucosal lesion, mass or ulceration was noted. Rest of the duodenum was normal    Ultrasound:   Esophagus: not examined   Stomach: not examined   Pancreas:     Areas examined: the entire gland    Parenchyma: Diffuse enlargement with extensive calcifications, echogenic strands as well as foci with side branch ectasia were noted in the head and genu of pancreas. No definite mass was seen. Main pancreatic duct was irregular and ectatic with dilation upto 8 mm. Body and tail were atrophic with ectasia of side branches. Liver: not examined   Bile Duct: Choledocho-duodenal anastomosis was noted in common hepatic duct.  No filing defects were seen              Lymph Node: no adenopathy        Specimen Removed:  None    Complications: None. EBL:  None.     Interventions: none      Recommendations:   -Low fat diet  -Continue current medications  -Follow up with Dr Chelsea Herman for consideration of Surgical resection  -Follow up with me in office after consulting Eduardo Ta MD  10/10/2018  10:38 AM

## 2018-10-10 NOTE — H&P
[address]  [address] [facility] [address] [facility], [address]  [phone]                                History and Physical     NAME: Nato Noriega. :  1955   MRN:  [id number]     HPI:  The patient was seen and examined. Past Surgical History:   Procedure Laterality Date    HX GI      EUS/EGD    HX HERNIA REPAIR      age 2 yrs left inguinal    HX ORTHOPAEDIC      ganglion cyst removed left wrist x 2    HX ORTHOPAEDIC      left knee fracture -    HX ORTHOPAEDIC      meniscus repair (2) on left knee    HX OTHER SURGICAL      ERCPS - stents and removal since  last 2014     HX OTHER SURGICAL      ERCP with stent 2015    HX OTHER SURGICAL  16    LAPAROTOMY EXPLORATORY; SMALL BOWEL RESECTION    HX OTHER SURGICAL      1. Exploratory laparotomy 2.  Resection of the necrotic proximal small bowel and oversewing of enteroenterotomy closure - [facility]-Dr. Ras Lowe    HX TONSILLECTOMY      AL PANCREAS SURGERY PROC UNLISTED      [facility]    VASCULAR SURGERY PROCEDURE UNLIST      vein stripped from left leg     Past Medical History:   Diagnosis Date    Adverse effect of anesthesia     woke up during ERCP - gasping    Bile duct stricture 2016    Chronic pain     chronic pancreatitis    GERD (gastroesophageal reflux disease)     Thompson's Esophagus    Ill-defined condition     blood infection x 4 per pt    Ill-defined condition     pancreas cyst    Other ill-defined conditions(799.89)     insomnia    Pancreatic duct obstruction 2017    Pancreatitis     Psychiatric disorder     Depression    Unspecified adverse effect of anesthesia     woke up during ERCP     Social History   Substance Use Topics    Smoking status: Former Smoker     Packs/day: 0.50     Years: 5.00     Quit date: 1977    Smokeless tobacco: Never Used      Comment: quit smoking cigarettes 30 yrs ago    Alcohol use No      Comment: none in 6-8 yrs     Allergies   Allergen Reactions    Aspirin Other (comments)     Does not take d/t chronic pancreatitis. Family History   Problem Relation Age of Onset    Stroke Mother      ?  Stroke Father     Other Brother      Agent Orange    Stroke Brother     Anesth Problems Neg Hx      Current Facility-Administered Medications   Medication Dose Route Frequency    0.9% sodium chloride infusion  50 mL/hr IntraVENous CONTINUOUS    sodium chloride (NS) flush 5-10 mL  5-10 mL IntraVENous Q8H    sodium chloride (NS) flush 5-10 mL  5-10 mL IntraVENous PRN    midazolam (VERSED) injection 0.25-10 mg  0.25-10 mg IntraVENous Multiple    fentaNYL citrate (PF) injection 100 mcg  100 mcg IntraVENous MULTIPLE DOSE GIVEN    naloxone (NARCAN) injection 0.4 mg  0.4 mg IntraVENous Multiple    flumazenil (ROMAZICON) 0.1 mg/mL injection 0.2 mg  0.2 mg IntraVENous Multiple    simethicone (MYLICON) 24IQ/6.9UP oral drops 80 mg  1.2 mL Oral Multiple    atropine injection 0.5 mg  0.5 mg IntraVENous ONCE PRN    EPINEPHrine (ADRENALIN) 0.1 mg/mL syringe 1 mg  1 mg Endoscopically ONCE PRN         PHYSICAL EXAM:  General: WD, WN. Alert, cooperative, no acute distress    HEENT: NC, Atraumatic. PERRLA, EOMI. Anicteric sclerae. Lungs:  CTA Bilaterally. No Wheezing/Rhonchi/Rales. Heart:  Regular  rhythm,  No murmur, No Rubs, No Gallops  Abdomen: Soft, Non distended, Non tender.  +Bowel sounds, no HSM  Extremities: No c/c/e  Neurologic:  CN 2-12 gi, Alert and oriented X 3. No acute neurological distress   Psych:   Good insight. Not anxious nor agitated. The heart, lungs and mental status were satisfactory for the administration of MAC sedation and for the procedure.       Mallampati score: 3       Assessment:   · Pancreatic mass/chronic pancreatitis    Plan:   · Endoscopic procedure  · MAC sedation

## 2018-10-10 NOTE — DISCHARGE INSTRUCTIONS
[address]  [address] [facility]  [id number]  1955    DISCOMFORT:  Sore throat-  warm salt water gargle  redness at IV site- apply warm compress to area; if redness or soreness persist- contact your physician  Gaseous discomfort- walking, belching will help relieve any discomfort  You may not operate a vehicle for 12 hours  You may not engage in an occupation involving machinery or appliances for rest of today  You may not drink alcoholic beverages for at least 12 hours  Avoid making any critical decisions for at least 24 hour  DIET  You may eat and drink after you leave. You may resume your regular diet - however -  remember your colon is empty and a heavy meal will produce gas. Avoid these foods:  vegetables, fried / greasy foods, carbonated drinks    ACTIVITY  You may resume your normal daily activities   Spend the remainder of the day resting -  avoid any strenuous activity. CALL M.D. ANY SIGN OF   Increasing pain, nausea, vomiting  Abdominal distension (swelling)  New increased bleeding (oral or rectal)  Fever (chills)  Pain in chest area  Bloody discharge from nose or mouth  Shortness of breath    Follow-up Instructions:   Call Dr. Juliana Agudelo for any questions or problems. If we took a biopsy please call the office within 2 weeks to discuss your                             pathology results. Telephone # [phone]        Continue same medications.

## 2018-10-10 NOTE — ANESTHESIA PREPROCEDURE EVALUATION
Anesthetic History   No history of anesthetic complications            Review of Systems / Medical History  Patient summary reviewed, nursing notes reviewed and pertinent labs reviewed    Pulmonary  Within defined limits                 Neuro/Psych   Within defined limits           Cardiovascular    Hypertension: well controlled                   GI/Hepatic/Renal     GERD: well controlled           Endo/Other  Within defined limits           Other Findings              Physical Exam    Airway  Mallampati: II  TM Distance: > 6 cm  Neck ROM: normal range of motion   Mouth opening: Normal     Cardiovascular  Regular rate and rhythm,  S1 and S2 normal,  no murmur, click, rub, or gallop             Dental  No notable dental hx       Pulmonary  Breath sounds clear to auscultation               Abdominal  GI exam deferred       Other Findings            Anesthetic Plan    ASA: 2            Induction: Intravenous  Anesthetic plan and risks discussed with: Patient

## 2020-11-11 ENCOUNTER — OFFICE VISIT (OUTPATIENT)
Dept: URGENT CARE | Age: 65
End: 2020-11-11
Payer: MEDICARE

## 2020-11-11 VITALS — TEMPERATURE: 98.8 F | HEART RATE: 67 BPM | OXYGEN SATURATION: 97 % | RESPIRATION RATE: 16 BRPM

## 2020-11-11 DIAGNOSIS — Z20.822 ENCOUNTER FOR LABORATORY TESTING FOR COVID-19 VIRUS: Primary | ICD-10-CM

## 2020-11-11 PROCEDURE — 99203 OFFICE O/P NEW LOW 30 MIN: CPT | Performed by: EMERGENCY MEDICINE

## 2020-11-11 NOTE — PROGRESS NOTES
Pt here for COVID testing after being exposed on Sunday to a her daughter who is COIVD positive. Pt denies any symptoms. This patient was seen in [facility] at [address] [place] [address] Urgent Care while outdoors at their vehicle due to COVID-19 pandemic with PPE and focused examination in order to decrease community viral transmission     The history is provided by the patient. Past Medical History:   Diagnosis Date    Adverse effect of anesthesia     woke up during ERCP - gasping    Bile duct stricture 1/27/2016    Chronic pain     chronic pancreatitis    GERD (gastroesophageal reflux disease)     Thompson's Esophagus    Ill-defined condition     blood infection x 4 per pt    Ill-defined condition     pancreas cyst    Other ill-defined conditions(799.89)     insomnia    Pancreatic duct obstruction 2/8/2017    Pancreatitis     Psychiatric disorder     Depression    Unspecified adverse effect of anesthesia     woke up during ERCP        Past Surgical History:   Procedure Laterality Date    HX GI      EUS/EGD    HX HERNIA REPAIR      age 2 yrs left inguinal    HX ORTHOPAEDIC      ganglion cyst removed left wrist x 2    HX ORTHOPAEDIC      left knee fracture 2013-1/30    HX ORTHOPAEDIC      meniscus repair (2) on left knee    HX OTHER SURGICAL      ERCPS - stents and removal since 1995 last 8/2014     HX OTHER SURGICAL      ERCP with stent 4/2015    HX OTHER SURGICAL  2/09/16    LAPAROTOMY EXPLORATORY; SMALL BOWEL RESECTION    HX OTHER SURGICAL      1. Exploratory laparotomy 2. Resection of the necrotic proximal small bowel and oversewing of enteroenterotomy closure - [facility]-Dr. Ronda Bell    HX TONSILLECTOMY      [place] PANCREAS SURGERY PROC UNLISTED  2014    [facility]    VASCULAR SURGERY PROCEDURE UNLIST      vein stripped from left leg         Family History   Problem Relation Age of Onset    Stroke Mother         ?     Stroke Father     Other Brother         Agent [id number] [facility]    Stroke Brother     Anesth Problems Neg Hx         Social History     Socioeconomic History    Marital status:      Spouse name: Not on file    Number of children: Not on file    Years of education: Not on file    Highest education level: Not on file   Occupational History    Not on file   Social Needs    Financial resource strain: Not on file    Food insecurity     Worry: Not on file     Inability: Not on file    Transportation needs     Medical: Not on file     Non-medical: Not on file   Tobacco Use    Smoking status: Former Smoker     Packs/day: 0.50     Years: 5.00     Pack years: 2.50     Last attempt to quit: 1977     Years since quittin.8    Smokeless tobacco: Never Used    Tobacco comment: quit smoking cigarettes 30 yrs ago   Substance and Sexual Activity    Alcohol use: No     Comment: none in 6-8 yrs    Drug use: No    Sexual activity: Not on file   Lifestyle    Physical activity     Days per week: Not on file     Minutes per session: Not on file    Stress: Not on file   Relationships    Social connections     Talks on phone: Not on file     Gets together: Not on file     Attends Yazidism service: Not on file     Active member of club or organization: Not on file     Attends meetings of clubs or organizations: Not on file     Relationship status: Not on file    Intimate partner violence     Fear of current or ex partner: Not on file     Emotionally abused: Not on file     Physically abused: Not on file     Forced sexual activity: Not on file   Other Topics Concern    Not on file   Social History Narrative    Not on file                ALLERGIES: Aspirin    Review of Systems   Constitutional: Negative for chills, diaphoresis, fatigue and fever. HENT: Negative for congestion, ear pain, postnasal drip, rhinorrhea and sore throat. No change in sense of taste or smell     Eyes: Negative for redness. Respiratory: Negative for cough, chest tightness, shortness of breath and wheezing. Cardiovascular: Negative for chest pain. Gastrointestinal: Negative for abdominal pain, diarrhea, nausea and vomiting. Musculoskeletal: Negative for myalgias. Skin: Negative for rash. Neurological: Negative for headaches. Vitals:    11/11/20 1124   Pulse: 67   Resp: 16   Temp: 98.8 °F (37.1 °C)   SpO2: 97%       Physical Exam  Vitals signs and nursing note reviewed. Constitutional:       General: He is not in acute distress. Appearance: Normal appearance. He is not ill-appearing, toxic-appearing or diaphoretic. Comments: Pt examined in a vehicle     HENT:      Head: Normocephalic. Nose: No rhinorrhea. Mouth/Throat:      Mouth: Mucous membranes are moist.      Pharynx: Oropharynx is clear. No oropharyngeal exudate or posterior oropharyngeal erythema. Eyes:      Conjunctiva/sclera: Conjunctivae normal.   Neck:      Musculoskeletal: Normal range of motion and neck supple. No neck rigidity or muscular tenderness. Cardiovascular:      Rate and Rhythm: Normal rate and regular rhythm. Heart sounds: Normal heart sounds. Pulmonary:      Effort: Pulmonary effort is normal. No respiratory distress. Breath sounds: Normal breath sounds. No stridor. No wheezing, rhonchi or rales. Abdominal:      General: Bowel sounds are normal.      Palpations: Abdomen is soft. Lymphadenopathy:      Cervical: No cervical adenopathy. Skin:     General: Skin is warm and dry. Capillary Refill: Capillary refill takes less than 2 seconds. Neurological:      Mental Status: He is alert and oriented to person, place, and time. Psychiatric:         Mood and Affect: Mood normal.         MDM    ICD-10-CM ICD-9-CM    1. Encounter for laboratory testing for COVID-19 virus  Z20.828 V01.79 NOVEL CORONAVIRUS (COVID-19)     No orders of the defined types were placed in this encounter.     The patient's condition and possible alternative diagnoses were discussed with the patient and they verbalized understanding. The patient is to follow up with their primary care doctor for continued care. If signs and symptoms persist or become worse or new symptoms develop, the pt is to go immediately to the emergency department. Any new medications that may have been written for should be taken as directed but should always be discussed with the primary care physician and pharmacist. This was communicated to the patient. Pt instructed to quarantine until COVID testing results are back and then duration of quarantine will depend on result, current recommendations and symptoms. The patient is to get immediate re-evaluation for any new or worsening symptoms. They are to quarantine from other household members. It was recommended they stay hydrated and practice deep breathing exercises.          Procedures

## 2020-11-14 LAB — SARS-COV-2, NAA: NOT DETECTED

## 2020-12-17 ENCOUNTER — TRANSCRIBE ORDER (OUTPATIENT)
Dept: SCHEDULING | Age: 65
End: 2020-12-17

## 2020-12-17 DIAGNOSIS — R63.4 UNINTENTIONAL WEIGHT LOSS: ICD-10-CM

## 2020-12-17 DIAGNOSIS — K86.1 CHRONIC PANCREATITIS (HCC): Primary | ICD-10-CM

## 2020-12-17 DIAGNOSIS — R97.8 ABNORMAL TUMOR MARKERS: ICD-10-CM

## 2020-12-23 ENCOUNTER — HOSPITAL ENCOUNTER (OUTPATIENT)
Dept: MRI IMAGING | Age: 65
Discharge: HOME | End: 2020-12-23
Attending: PHYSICIAN ASSISTANT
Payer: MEDICARE

## 2020-12-23 DIAGNOSIS — R97.8 ABNORMAL TUMOR MARKERS: ICD-10-CM

## 2020-12-23 DIAGNOSIS — K86.1 CHRONIC PANCREATITIS (HCC): ICD-10-CM

## 2020-12-23 DIAGNOSIS — R63.4 UNINTENTIONAL WEIGHT LOSS: ICD-10-CM

## 2020-12-23 PROCEDURE — 74183 MRI ABD W/O CNTR FLWD CNTR: CPT

## 2020-12-23 PROCEDURE — 82565 ASSAY OF CREATININE: CPT

## 2020-12-23 PROCEDURE — A9585 GADOBUTROL INJECTION: HCPCS | Performed by: PHYSICIAN ASSISTANT

## 2020-12-23 PROCEDURE — 74011250636 HC RX REV CODE- 250/636: Performed by: PHYSICIAN ASSISTANT

## 2020-12-23 RX ADMIN — GADOBUTROL 7.5 ML: 604.72 INJECTION INTRAVENOUS at 15:15

## 2020-12-29 LAB — CREAT BLD-MCNC: 0.9 MG/DL (ref 0.6–1.3)

## 2021-10-10 ENCOUNTER — APPOINTMENT (OUTPATIENT)
Dept: CT IMAGING | Age: 66
DRG: 853 | End: 2021-10-10
Attending: EMERGENCY MEDICINE
Payer: MEDICARE

## 2021-10-10 ENCOUNTER — HOSPITAL ENCOUNTER (INPATIENT)
Age: 66
LOS: 17 days | Discharge: HOME | DRG: 853 | End: 2021-10-27
Attending: EMERGENCY MEDICINE | Admitting: INTERNAL MEDICINE
Payer: MEDICARE

## 2021-10-10 DIAGNOSIS — A41.9 SEPSIS, DUE TO UNSPECIFIED ORGANISM, UNSPECIFIED WHETHER ACUTE ORGAN DYSFUNCTION PRESENT (HCC): ICD-10-CM

## 2021-10-10 DIAGNOSIS — R10.84 ABDOMINAL PAIN, GENERALIZED: Primary | ICD-10-CM

## 2021-10-10 DIAGNOSIS — K52.9 COLITIS: ICD-10-CM

## 2021-10-10 DIAGNOSIS — K65.1 INTRA-ABDOMINAL ABSCESS (HCC): ICD-10-CM

## 2021-10-10 DIAGNOSIS — K52.9 NONINFECTIOUS GASTROENTERITIS, UNSPECIFIED TYPE: ICD-10-CM

## 2021-10-10 LAB
ALBUMIN SERPL-MCNC: 2.5 G/DL (ref 3.5–5)
ALBUMIN/GLOB SERPL: 0.5 {RATIO} (ref 1.1–2.2)
ALP SERPL-CCNC: 88 U/L (ref 45–117)
ALT SERPL-CCNC: 15 U/L (ref 12–78)
ANION GAP SERPL CALC-SCNC: 9 MMOL/L (ref 5–15)
AST SERPL-CCNC: 19 U/L (ref 15–37)
BASOPHILS # BLD: 0.1 K/UL (ref 0–0.1)
BASOPHILS NFR BLD: 0 % (ref 0–1)
BILIRUB SERPL-MCNC: 0.7 MG/DL (ref 0.2–1)
BUN SERPL-MCNC: 14 MG/DL (ref 6–20)
BUN/CREAT SERPL: 12 (ref 12–20)
CALCIUM SERPL-MCNC: 8.9 MG/DL (ref 8.5–10.1)
CHLORIDE SERPL-SCNC: 94 MMOL/L (ref 97–108)
CO2 SERPL-SCNC: 27 MMOL/L (ref 21–32)
COMMENT, HOLDF: NORMAL
CREAT SERPL-MCNC: 1.13 MG/DL (ref 0.7–1.3)
DIFFERENTIAL METHOD BLD: ABNORMAL
EOSINOPHIL # BLD: 0 K/UL (ref 0–0.4)
EOSINOPHIL NFR BLD: 0 % (ref 0–7)
ERYTHROCYTE [DISTWIDTH] IN BLOOD BY AUTOMATED COUNT: 13.6 % (ref 11.5–14.5)
GLOBULIN SER CALC-MCNC: 5.1 G/DL (ref 2–4)
GLUCOSE SERPL-MCNC: 168 MG/DL (ref 65–100)
HCT VFR BLD AUTO: 25.5 % (ref 36.6–50.3)
HGB BLD-MCNC: 8.6 G/DL (ref 12.1–17)
IMM GRANULOCYTES # BLD AUTO: 0.2 K/UL (ref 0–0.04)
IMM GRANULOCYTES NFR BLD AUTO: 1 % (ref 0–0.5)
LACTATE SERPL-SCNC: 0.6 MMOL/L (ref 0.4–2)
LIPASE SERPL-CCNC: 24 U/L (ref 73–393)
LYMPHOCYTES # BLD: 1.5 K/UL (ref 0.8–3.5)
LYMPHOCYTES NFR BLD: 10 % (ref 12–49)
MCH RBC QN AUTO: 28.6 PG (ref 26–34)
MCHC RBC AUTO-ENTMCNC: 33.7 G/DL (ref 30–36.5)
MCV RBC AUTO: 84.7 FL (ref 80–99)
MONOCYTES # BLD: 1 K/UL (ref 0–1)
MONOCYTES NFR BLD: 7 % (ref 5–13)
NEUTS SEG # BLD: 12 K/UL (ref 1.8–8)
NEUTS SEG NFR BLD: 82 % (ref 32–75)
NRBC # BLD: 0 K/UL (ref 0–0.01)
NRBC BLD-RTO: 0 PER 100 WBC
PLATELET # BLD AUTO: 487 K/UL (ref 150–400)
PMV BLD AUTO: 8.9 FL (ref 8.9–12.9)
POTASSIUM SERPL-SCNC: 2.6 MMOL/L (ref 3.5–5.1)
PROT SERPL-MCNC: 7.6 G/DL (ref 6.4–8.2)
RBC # BLD AUTO: 3.01 M/UL (ref 4.1–5.7)
SAMPLES BEING HELD,HOLD: NORMAL
SODIUM SERPL-SCNC: 130 MMOL/L (ref 136–145)
TROPONIN-HIGH SENSITIVITY: 10 NG/L (ref 0–76)
WBC # BLD AUTO: 14.7 K/UL (ref 4.1–11.1)

## 2021-10-10 PROCEDURE — 80053 COMPREHEN METABOLIC PANEL: CPT

## 2021-10-10 PROCEDURE — 85025 COMPLETE CBC W/AUTO DIFF WBC: CPT

## 2021-10-10 PROCEDURE — 65660000000 HC RM CCU STEPDOWN

## 2021-10-10 PROCEDURE — 74011250636 HC RX REV CODE- 250/636: Performed by: EMERGENCY MEDICINE

## 2021-10-10 PROCEDURE — 74177 CT ABD & PELVIS W/CONTRAST: CPT

## 2021-10-10 PROCEDURE — 83605 ASSAY OF LACTIC ACID: CPT

## 2021-10-10 PROCEDURE — 87040 BLOOD CULTURE FOR BACTERIA: CPT

## 2021-10-10 PROCEDURE — 74011000636 HC RX REV CODE- 636: Performed by: RADIOLOGY

## 2021-10-10 PROCEDURE — 74011000258 HC RX REV CODE- 258: Performed by: EMERGENCY MEDICINE

## 2021-10-10 PROCEDURE — 83690 ASSAY OF LIPASE: CPT

## 2021-10-10 PROCEDURE — 36415 COLL VENOUS BLD VENIPUNCTURE: CPT

## 2021-10-10 PROCEDURE — 74011250637 HC RX REV CODE- 250/637: Performed by: EMERGENCY MEDICINE

## 2021-10-10 PROCEDURE — 84484 ASSAY OF TROPONIN QUANT: CPT

## 2021-10-10 PROCEDURE — 96374 THER/PROPH/DIAG INJ IV PUSH: CPT

## 2021-10-10 PROCEDURE — 99284 EMERGENCY DEPT VISIT MOD MDM: CPT

## 2021-10-10 PROCEDURE — 93005 ELECTROCARDIOGRAM TRACING: CPT

## 2021-10-10 RX ORDER — SODIUM CHLORIDE 0.9 % (FLUSH) 0.9 %
5-40 SYRINGE (ML) INJECTION AS NEEDED
Status: DISCONTINUED | OUTPATIENT
Start: 2021-10-10 | End: 2021-10-27 | Stop reason: HOSPADM

## 2021-10-10 RX ORDER — ONDANSETRON 2 MG/ML
4 INJECTION INTRAMUSCULAR; INTRAVENOUS
Status: DISCONTINUED | OUTPATIENT
Start: 2021-10-10 | End: 2021-10-27 | Stop reason: HOSPADM

## 2021-10-10 RX ORDER — POLYETHYLENE GLYCOL 3350 17 G/17G
17 POWDER, FOR SOLUTION ORAL DAILY PRN
Status: DISCONTINUED | OUTPATIENT
Start: 2021-10-10 | End: 2021-10-27 | Stop reason: HOSPADM

## 2021-10-10 RX ORDER — SODIUM CHLORIDE 0.9 % (FLUSH) 0.9 %
5-40 SYRINGE (ML) INJECTION EVERY 8 HOURS
Status: DISCONTINUED | OUTPATIENT
Start: 2021-10-10 | End: 2021-10-27 | Stop reason: HOSPADM

## 2021-10-10 RX ORDER — ACETAMINOPHEN 325 MG/1
650 TABLET ORAL
Status: DISCONTINUED | OUTPATIENT
Start: 2021-10-10 | End: 2021-10-27 | Stop reason: HOSPADM

## 2021-10-10 RX ORDER — ONDANSETRON 4 MG/1
4 TABLET, ORALLY DISINTEGRATING ORAL
Status: DISCONTINUED | OUTPATIENT
Start: 2021-10-10 | End: 2021-10-27 | Stop reason: HOSPADM

## 2021-10-10 RX ORDER — HYDROMORPHONE HYDROCHLORIDE 1 MG/ML
1 INJECTION, SOLUTION INTRAMUSCULAR; INTRAVENOUS; SUBCUTANEOUS ONCE
Status: COMPLETED | OUTPATIENT
Start: 2021-10-10 | End: 2021-10-10

## 2021-10-10 RX ORDER — ENOXAPARIN SODIUM 100 MG/ML
40 INJECTION SUBCUTANEOUS DAILY
Status: DISCONTINUED | OUTPATIENT
Start: 2021-10-11 | End: 2021-10-27 | Stop reason: HOSPADM

## 2021-10-10 RX ORDER — POTASSIUM CHLORIDE 750 MG/1
40 TABLET, FILM COATED, EXTENDED RELEASE ORAL
Status: COMPLETED | OUTPATIENT
Start: 2021-10-10 | End: 2021-10-10

## 2021-10-10 RX ORDER — HYDROMORPHONE HYDROCHLORIDE 1 MG/ML
0.4 INJECTION, SOLUTION INTRAMUSCULAR; INTRAVENOUS; SUBCUTANEOUS
Status: DISCONTINUED | OUTPATIENT
Start: 2021-10-10 | End: 2021-10-16

## 2021-10-10 RX ORDER — ACETAMINOPHEN 650 MG/1
650 SUPPOSITORY RECTAL
Status: DISCONTINUED | OUTPATIENT
Start: 2021-10-10 | End: 2021-10-27 | Stop reason: HOSPADM

## 2021-10-10 RX ADMIN — Medication 10 ML: at 22:00

## 2021-10-10 RX ADMIN — SODIUM CHLORIDE 1000 ML: 9 INJECTION, SOLUTION INTRAVENOUS at 20:09

## 2021-10-10 RX ADMIN — HYDROMORPHONE HYDROCHLORIDE 1 MG: 1 INJECTION, SOLUTION INTRAMUSCULAR; INTRAVENOUS; SUBCUTANEOUS at 20:05

## 2021-10-10 RX ADMIN — IOPAMIDOL 100 ML: 755 INJECTION, SOLUTION INTRAVENOUS at 20:15

## 2021-10-10 RX ADMIN — POTASSIUM CHLORIDE 40 MEQ: 750 TABLET, FILM COATED, EXTENDED RELEASE ORAL at 20:08

## 2021-10-10 RX ADMIN — PIPERACILLIN AND TAZOBACTAM 3.38 G: 3; .375 INJECTION, POWDER, LYOPHILIZED, FOR SOLUTION INTRAVENOUS at 21:04

## 2021-10-10 NOTE — ED PROVIDER NOTES
Pt reprts pain in pancreas. Symptoms started about a month ago. Dealing with acute on chronic pancreatitis for 25 years. R sided chest pain starting 2 days ago. Comes and goes. No exacerbating or alleviating factors. No vomiting.  + fever to 101 for week or so. Occasional cough. No SOB. No COVID vaccine. COVID test last week was negative. Taking tylenol and dilaudid at home for pain. GI:  Dr. Nazario Hanover           Past Medical History:   Diagnosis Date    Adverse effect of anesthesia     woke up during ERCP - gasping    Bile duct stricture 1/27/2016    Chronic pain     chronic pancreatitis    GERD (gastroesophageal reflux disease)     Thompson's Esophagus    Ill-defined condition     blood infection x 4 per pt    Ill-defined condition     pancreas cyst    Other ill-defined conditions(799.89)     insomnia    Pancreatic duct obstruction 2/8/2017    Pancreatitis     Psychiatric disorder     Depression    Unspecified adverse effect of anesthesia     woke up during ERCP       Past Surgical History:   Procedure Laterality Date    HX GI      EUS/EGD    HX HERNIA REPAIR      age 2 yrs left inguinal    HX ORTHOPAEDIC      ganglion cyst removed left wrist x 2    HX ORTHOPAEDIC      left knee fracture 2013-1/30    HX ORTHOPAEDIC      meniscus repair (2) on left knee    HX OTHER SURGICAL      ERCPS - stents and removal since 1995 last 8/2014     HX OTHER SURGICAL      ERCP with stent 4/2015    HX OTHER SURGICAL  2/09/16    LAPAROTOMY EXPLORATORY; SMALL BOWEL RESECTION    HX OTHER SURGICAL      1. Exploratory laparotomy 2. Resection of the necrotic proximal small bowel and oversewing of enteroenterotomy closure - [facility]-Dr. Erik Meade    HX TONSILLECTOMY      [place] PANCREAS SURGERY PROC UNLISTED  2014    [facility]    VASCULAR SURGERY PROCEDURE UNLIST      vein stripped from left leg         Family History:   Problem Relation Age of Onset    Stroke Mother         ?     Stroke Father     Other Brother Agent Mara Blood Stroke Brother     Anesth Problems Neg Hx        Social History     Socioeconomic History    Marital status:      Spouse name: Not on file    Number of children: Not on file    Years of education: Not on file    Highest education level: Not on file   Occupational History    Not on file   Tobacco Use    Smoking status: Former Smoker     Packs/day: 0.50     Years: 5.00     Pack years: 2.50     Quit date: 1977     Years since quittin.7    Smokeless tobacco: Never Used    Tobacco comment: quit smoking cigarettes 30 yrs ago   Substance and Sexual Activity    Alcohol use: No     Comment: none in 6-8 yrs    Drug use: No    Sexual activity: Not on file   Other Topics Concern    Not on file   Social History Narrative    Not on file     Social Determinants of Health     Financial Resource Strain:     Difficulty of Paying Living Expenses:    Food Insecurity:     Worried About Running Out of Food in the Last Year:     Ran Out of Food in the Last Year:    Transportation Needs:     Lack of Transportation (Medical):  Lack of Transportation (Non-Medical):    Physical Activity:     Days of Exercise per Week:     Minutes of Exercise per Session:    Stress:     Feeling of Stress :    Social Connections:     Frequency of Communication with Friends and Family:     Frequency of Social Gatherings with Friends and Family:     Attends Caodaism Services:     Active Member of Clubs or Organizations:     Attends Club or Organization Meetings:     Marital Status:    Intimate Partner Violence:     Fear of Current or Ex-Partner:     Emotionally Abused:     Physically Abused:     Sexually Abused: ALLERGIES: Aspirin    Review of Systems   Constitutional: Positive for fever. Negative for diaphoresis. HENT: Negative for facial swelling. Eyes: Negative for visual disturbance. Respiratory: Negative for cough. Cardiovascular: Negative for chest pain. Gastrointestinal: Positive for abdominal pain. Genitourinary: Negative for dysuria. Musculoskeletal: Negative for joint swelling. Skin: Negative for rash. Neurological: Negative for headaches. Hematological: Negative for adenopathy. Psychiatric/Behavioral: Negative for suicidal ideas. Vitals:    10/10/21 1815   BP: 118/72   Pulse: (!) 102   Resp: 21   Temp: 100.1 °F (37.8 °C)   SpO2: 100%            Physical Exam  Vitals and nursing note reviewed. Constitutional:       General: He is not in acute distress. Appearance: He is well-developed. HENT:      Head: Normocephalic and atraumatic. Eyes:      Pupils: Pupils are equal, round, and reactive to light. Cardiovascular:      Rate and Rhythm: Tachycardia present. Pulmonary:      Effort: Pulmonary effort is normal. No respiratory distress. Abdominal:      General: There is no distension. Musculoskeletal:         General: Normal range of motion. Cervical back: Normal range of motion and neck supple. Skin:     General: Skin is warm and dry. Neurological:      Mental Status: He is alert and oriented to person, place, and time. MDM  Number of Diagnoses or Management Options     Amount and/or Complexity of Data Reviewed  Clinical lab tests: reviewed  Tests in the radiology section of CPT®: reviewed  Tests in the medicine section of CPT®: reviewed         Perfect Serve Consult for Admission  8:50 PM    ED Room Number: ER25/25  Patient Name and age:  Jordan Eng. 77 y.o.  male  Working Diagnosis:   1. Abdominal pain, generalized    2. Intra-abdominal abscess ([address]    3. Noninfectious gastroenteritis, unspecified type    4. Sepsis, due to unspecified organism, unspecified whether acute organ dysfunction present ([address]    5.  Colitis        COVID-19 Suspicion:  no  Sepsis present:  yes  Reassessment needed: no  Code Status:  Full Code  Readmission: no  Isolation Requirements:  no  Recommended Level of Care: med/surg  Department:  [facility] Adult ED - [phone]    Other:  Acute on chronic pancreatitis. Colitis, sepsis with fever and elevated WBC. Known to Dr. Netta Ron. Total critical care time spent exclusive of procedures:  35 minutes.     Procedures

## 2021-10-10 NOTE — ED TRIAGE NOTES
Patient arrives from home with chronic abdominal pain and pancreatitis. Patient states that last night the pain got worse and today he started having chest pains.

## 2021-10-11 LAB
ALBUMIN SERPL-MCNC: 2.1 G/DL (ref 3.5–5)
ALBUMIN/GLOB SERPL: 0.5 {RATIO} (ref 1.1–2.2)
ALP SERPL-CCNC: 79 U/L (ref 45–117)
ALT SERPL-CCNC: 15 U/L (ref 12–78)
ANION GAP SERPL CALC-SCNC: 7 MMOL/L (ref 5–15)
AST SERPL-CCNC: 16 U/L (ref 15–37)
BASOPHILS # BLD: 0 K/UL (ref 0–0.1)
BASOPHILS NFR BLD: 0 % (ref 0–1)
BILIRUB SERPL-MCNC: 0.7 MG/DL (ref 0.2–1)
BUN SERPL-MCNC: 12 MG/DL (ref 6–20)
BUN/CREAT SERPL: 14 (ref 12–20)
CALCIUM SERPL-MCNC: 8.4 MG/DL (ref 8.5–10.1)
CHLORIDE SERPL-SCNC: 98 MMOL/L (ref 97–108)
CO2 SERPL-SCNC: 28 MMOL/L (ref 21–32)
CREAT SERPL-MCNC: 0.85 MG/DL (ref 0.7–1.3)
DIFFERENTIAL METHOD BLD: ABNORMAL
EOSINOPHIL # BLD: 0.1 K/UL (ref 0–0.4)
EOSINOPHIL NFR BLD: 1 % (ref 0–7)
ERYTHROCYTE [DISTWIDTH] IN BLOOD BY AUTOMATED COUNT: 13.7 % (ref 11.5–14.5)
GLOBULIN SER CALC-MCNC: 4.5 G/DL (ref 2–4)
GLUCOSE SERPL-MCNC: 106 MG/DL (ref 65–100)
HCT VFR BLD AUTO: 22.1 % (ref 36.6–50.3)
HGB BLD-MCNC: 7.5 G/DL (ref 12.1–17)
IMM GRANULOCYTES # BLD AUTO: 0.1 K/UL (ref 0–0.04)
IMM GRANULOCYTES NFR BLD AUTO: 1 % (ref 0–0.5)
LYMPHOCYTES # BLD: 1.1 K/UL (ref 0.8–3.5)
LYMPHOCYTES NFR BLD: 9 % (ref 12–49)
MCH RBC QN AUTO: 28.7 PG (ref 26–34)
MCHC RBC AUTO-ENTMCNC: 33.9 G/DL (ref 30–36.5)
MCV RBC AUTO: 84.7 FL (ref 80–99)
MONOCYTES # BLD: 1.1 K/UL (ref 0–1)
MONOCYTES NFR BLD: 8 % (ref 5–13)
NEUTS SEG # BLD: 10.8 K/UL (ref 1.8–8)
NEUTS SEG NFR BLD: 81 % (ref 32–75)
NRBC # BLD: 0 K/UL (ref 0–0.01)
NRBC BLD-RTO: 0 PER 100 WBC
PLATELET # BLD AUTO: 396 K/UL (ref 150–400)
PMV BLD AUTO: 9.3 FL (ref 8.9–12.9)
POTASSIUM SERPL-SCNC: 3.1 MMOL/L (ref 3.5–5.1)
PROT SERPL-MCNC: 6.6 G/DL (ref 6.4–8.2)
RBC # BLD AUTO: 2.61 M/UL (ref 4.1–5.7)
SODIUM SERPL-SCNC: 133 MMOL/L (ref 136–145)
WBC # BLD AUTO: 13.1 K/UL (ref 4.1–11.1)

## 2021-10-11 PROCEDURE — 74011250637 HC RX REV CODE- 250/637: Performed by: INTERNAL MEDICINE

## 2021-10-11 PROCEDURE — 36415 COLL VENOUS BLD VENIPUNCTURE: CPT

## 2021-10-11 PROCEDURE — 74011250636 HC RX REV CODE- 250/636: Performed by: INTERNAL MEDICINE

## 2021-10-11 PROCEDURE — 85025 COMPLETE CBC W/AUTO DIFF WBC: CPT

## 2021-10-11 PROCEDURE — 74011000258 HC RX REV CODE- 258: Performed by: INTERNAL MEDICINE

## 2021-10-11 PROCEDURE — 99221 1ST HOSP IP/OBS SF/LOW 40: CPT | Performed by: NURSE PRACTITIONER

## 2021-10-11 PROCEDURE — 65660000000 HC RM CCU STEPDOWN

## 2021-10-11 PROCEDURE — 80053 COMPREHEN METABOLIC PANEL: CPT

## 2021-10-11 RX ORDER — SODIUM CHLORIDE 9 MG/ML
75 INJECTION, SOLUTION INTRAVENOUS CONTINUOUS
Status: DISCONTINUED | OUTPATIENT
Start: 2021-10-11 | End: 2021-10-18

## 2021-10-11 RX ORDER — POTASSIUM CHLORIDE 7.45 MG/ML
10 INJECTION INTRAVENOUS
Status: COMPLETED | OUTPATIENT
Start: 2021-10-11 | End: 2021-10-11

## 2021-10-11 RX ORDER — LANOLIN ALCOHOL/MO/W.PET/CERES
3 CREAM (GRAM) TOPICAL
Status: DISCONTINUED | OUTPATIENT
Start: 2021-10-11 | End: 2021-10-21

## 2021-10-11 RX ADMIN — POTASSIUM CHLORIDE 10 MEQ: 7.46 INJECTION, SOLUTION INTRAVENOUS at 08:30

## 2021-10-11 RX ADMIN — ENOXAPARIN SODIUM 40 MG: 100 INJECTION SUBCUTANEOUS at 08:50

## 2021-10-11 RX ADMIN — Medication 10 ML: at 06:31

## 2021-10-11 RX ADMIN — PIPERACILLIN AND TAZOBACTAM 3.38 G: 3; .375 INJECTION, POWDER, LYOPHILIZED, FOR SOLUTION INTRAVENOUS at 09:10

## 2021-10-11 RX ADMIN — ONDANSETRON HYDROCHLORIDE 4 MG: 2 INJECTION, SOLUTION INTRAMUSCULAR; INTRAVENOUS at 03:09

## 2021-10-11 RX ADMIN — ACETAMINOPHEN 650 MG: 325 TABLET ORAL at 15:41

## 2021-10-11 RX ADMIN — PIPERACILLIN AND TAZOBACTAM 3.38 G: 3; .375 INJECTION, POWDER, LYOPHILIZED, FOR SOLUTION INTRAVENOUS at 17:18

## 2021-10-11 RX ADMIN — HYDROMORPHONE HYDROCHLORIDE 0.4 MG: 1 INJECTION, SOLUTION INTRAMUSCULAR; INTRAVENOUS; SUBCUTANEOUS at 15:46

## 2021-10-11 RX ADMIN — Medication 3 MG: at 21:11

## 2021-10-11 RX ADMIN — HYDROMORPHONE HYDROCHLORIDE 0.4 MG: 1 INJECTION, SOLUTION INTRAMUSCULAR; INTRAVENOUS; SUBCUTANEOUS at 08:50

## 2021-10-11 RX ADMIN — POTASSIUM CHLORIDE 10 MEQ: 7.46 INJECTION, SOLUTION INTRAVENOUS at 06:32

## 2021-10-11 RX ADMIN — HYDROMORPHONE HYDROCHLORIDE 0.4 MG: 1 INJECTION, SOLUTION INTRAMUSCULAR; INTRAVENOUS; SUBCUTANEOUS at 21:11

## 2021-10-11 RX ADMIN — POTASSIUM CHLORIDE 10 MEQ: 7.46 INJECTION, SOLUTION INTRAVENOUS at 12:30

## 2021-10-11 RX ADMIN — SODIUM CHLORIDE 75 ML/HR: 900 INJECTION, SOLUTION INTRAVENOUS at 15:46

## 2021-10-11 RX ADMIN — SODIUM CHLORIDE 75 ML/HR: 900 INJECTION, SOLUTION INTRAVENOUS at 11:00

## 2021-10-11 RX ADMIN — POTASSIUM CHLORIDE 10 MEQ: 7.46 INJECTION, SOLUTION INTRAVENOUS at 11:00

## 2021-10-11 RX ADMIN — HYDROMORPHONE HYDROCHLORIDE 0.4 MG: 1 INJECTION, SOLUTION INTRAMUSCULAR; INTRAVENOUS; SUBCUTANEOUS at 03:04

## 2021-10-11 NOTE — ROUTINE PROCESS
TRANSFER - IN REPORT:    Verbal report received from [facility] (name) on Mitali Pugh.  being received from ED (unit) for routine progression of care      Report consisted of patients Situation, Background, Assessment and   Recommendations(SBAR). Information from the following report(s) SBAR, Kardex, ED Summary, Intake/Output, MAR and Recent Results was reviewed with the receiving nurse. Opportunity for questions and clarification was provided. Assessment completed upon patients arrival to unit and care assumed.

## 2021-10-11 NOTE — ROUTINE PROCESS
TRANSFER - OUT REPORT:    Verbal report given to Shawnee Jacobo RN(name) on Rogerio Bryan.  being transferred to (unit) for routine progression of care       Report consisted of patients Situation, Background, Assessment and   Recommendations(SBAR). Information from the following report(s) SBAR, ED Summary, Intake/Output, MAR and Recent Results was reviewed with the receiving nurse. Lines:   Peripheral IV 10/10/21 Right Antecubital (Active)   Site Assessment Clean, dry, & intact 10/10/21 1828   Phlebitis Assessment 0 10/10/21 1828   Infiltration Assessment 0 10/10/21 1828   Dressing Status Clean, dry, & intact 10/10/21 1828   Dressing Type Transparent 10/10/21 1828   Hub Color/Line Status Pink 10/10/21 1828        Opportunity for questions and clarification was provided.       Patient transported with:   Registered Nurse

## 2021-10-11 NOTE — PROGRESS NOTES
RUR: 12% Low    ARAM: Home with wife, no home health needs indicated. Patient's wife will provide transport home once medically stable. No discharge barriers identified. Follow-up with PCP. Primary Contact: Wife, Gabriel Kaplan, [phone]    Medicare pt has received, reviewed, and signed 1st IM letter informing them of their right to appeal the discharge. Signed copied has been placed on pt bedside chart. Care Management Interventions  PCP Verified by CM: Yes (Dr. Tari Christine, last seen 1 week ago )  Palliative Care Criteria Met (RRAT>21 & CHF Dx)?: No  Mode of Transport at Discharge: Self (Wife to provide transport )  MyChart Signup: No  Discharge Durable Medical Equipment: No  Health Maintenance Reviewed: Yes  Physical Therapy Consult: No  Occupational Therapy Consult: No  Speech Therapy Consult: No  Support Systems: Spouse/Significant Other  The Plan for Transition of Care is Related to the Following Treatment Goals : Home   Discharge Location  Discharge Placement: Home    Reason for Admission:  Sepsis                      RUR Score:    12% Low                 Plan for utilizing home health:      Not indicated     PCP: First and Last name:  Buster Alvarado MD     Name of Practice:    Are you a current patient: Yes/No: Yes   Approximate date of last visit: 1 week ago   Can you participate in a virtual visit with your PCP: Yes                    Current Advanced Directive/Advance Care Plan: Full Code      Healthcare Decision Maker:   Click here to complete [address] including selection of the Healthcare Decision Maker Relationship (ie \"Primary\")             Primary Decision Maker: Sushant Myles - Spouse, Legal Guardian - [phone]                  Transition of Care Plan:    Home    CM met with patient at bedside to introduce self and explain role. Patient lives with his wife in a private residence.  Prior to hospitalization patient was independent with ADL's, IADL's, ambulation and driving. Patient owns crutches and a rolling walker. Patient expressed he has history of chronic pancreatitis. Patient's wife will provide transport home once medically stable. No discharge barriers identified. CM verified patient's demographics, insurance, and PCP. Patient's preferred pharmacy is [facility] with no barriers in obtaining prescriptions. CM will continue to follow for transitions of care.      VEL Martinez   [phone]

## 2021-10-11 NOTE — ROUTINE PROCESS
Bedside shift change report given to Rosa M RN (oncoming nurse) by Maite South RN (offgoing nurse). Report included the following information SBAR and Kardex.

## 2021-10-11 NOTE — PROGRESS NOTES
[facility]  Hospitalist Group                                                                                          Hospitalist Progress Note  [facility],   Answering service: [phone] from in house phone        Date of Service:  10/11/2021  NAME:  Christy Segura. :  1955  MRN:  [id number]      Admission Summary:   77year old with past medical history chronic pancreatitis, s/p choledochojejunostomy for biliary stricture. Here with sepsis and found to have multiloculated abdominal fluid collection.          Interval history / Subjective: Follow up sepsis/fluid collection. Patient seen and examined. Pain improved with medication. Had fever overnight. General surgery consulted. Reports has family issues and has taken priority over his health     Assessment & Plan:     Sepsis (fever, leukocytosis, tachycardia) with intra-abdominal fluid collection:  -CT abd/pelvis with multiloculated complex and septated collection adjacent to the tail of the pancreas and the gastric fundus  -general surgery consulted  -continue zosyn  -follow blood cultures  -pain control  -NPO for now    Hyponatremia:   -suspect due to volume depletion, initiate IVFs  Hypokalemia: replete   Anemia, normocytic:   -monitor closely, Hgb 7.5, transfuse for Hgb <7    Chronic pancreatitis. Severe biliary strictures, s/p choledochojejunostomy on 2016  -consider GI consult pending clinical course     Code status: full   DVT prophylaxis: lovenox    Care Plan discussed with: Patient/Family and Nurse  Anticipated Disposition: Home w/Family  Anticipated Discharge: Greater than 48 hours     Hospital Problems  Date Reviewed: 2020        Codes Class Noted POA    Sepsis ([address] ICD-10-CM: A41.9  ICD-9-CM: 038.9, 995.91  10/10/2021 Unknown                Review of Systems:     Negative unless stated above     Vital Signs:    Last 24hrs VS reviewed since prior progress note.  Most recent are:  Visit Vitals  /71 (BP 1 Location: Left upper arm, BP Patient Position: At rest)   Pulse 80   Temp 100.1 °F (37.8 °C)   Resp 16   Ht 6' (1.829 m)   Wt 73 kg (161 lb)   SpO2 98%   BMI 21.84 kg/m²         Intake/Output Summary (Last 24 hours) at 10/11/2021 1000  Last data filed at 10/10/2021 2111  Gross per 24 hour   Intake 1100 ml   Output    Net 1100 ml        Physical Examination:     I had a face to face encounter with this patient and independently examined them on 10/11/2021 as outlined below:          Constitutional:  No acute distress, cooperative, pleasant    ENT:  Oral mucosa moist, oropharynx benign. Resp:  CTA bilaterally. No wheezing/rhonchi/rales. No accessory muscle use   CV:  Regular rhythm, normal rate, no murmurs, gallops, rubs    GI:  Soft, non distended, non tender to palpation. Well healed central vertical scar, No guarding normoactive bowel sounds, no hepatosplenomegaly     Musculoskeletal:  No edema, warm, 2+ pulses throughout    Neurologic:  Moves all extremities. Data Review:    Review and/or order of clinical lab test  Review and/or order of tests in the radiology section of CPT  Review and/or order of tests in the medicine section of CPT      Labs:     Recent Labs     10/11/21  0634 10/10/21  1826   WBC 13.1* 14.7*   HGB 7.5* 8.6*   HCT 22.1* 25.5*    487*     Recent Labs     10/11/21  0634 10/10/21  1826   * 130*   K 3.1* 2.6*   CL 98 94*   CO2 28 27   BUN 12 14   CREA 0.85 1.13   * 168*   CA 8.4* 8.9     Recent Labs     10/11/21  0634 10/10/21  1826   ALT 15 15   AP 79 88   TBILI 0.7 0.7   TP 6.6 7.6   ALB 2.1* 2.5*   GLOB 4.5* 5.1*   LPSE  --  24*     No results for input(s): INR, PTP, APTT, INREXT in the last 72 hours. No results for input(s): FE, TIBC, PSAT, FERR in the last 72 hours. No results found for: FOL, RBCF   No results for input(s): PH, PCO2, PO2 in the last 72 hours. No results for input(s): CPK, CKNDX, TROIQ in the last 72 hours.     No lab exists for component: CPKMB  No results found for: CHOL, CHOLX, CHLST, CHOLV, HDL, HDLP, LDL, LDLC, DLDLP, TGLX, TRIGL, TRIGP, CHHD, CHHDX  Lab Results   Component Value Date/Time    Glucose (POC) 102 (H) 02/20/2016 06:17 AM    Glucose (POC) 136 (H) 02/19/2016 10:57 PM    Glucose (POC) 116 (H) 02/19/2016 04:44 PM    Glucose (POC) 132 (H) 02/19/2016 11:43 AM    Glucose (POC) 113 (H) 02/19/2016 06:34 AM     Lab Results   Component Value Date/Time    Color DARK YELLOW 02/08/2016 08:22 PM    Appearance CLOUDY (A) 02/08/2016 08:22 PM    Specific gravity 1.026 02/08/2016 08:22 PM    pH (UA) 5.5 02/08/2016 08:22 PM    Protein 100 (A) 02/08/2016 08:22 PM    Glucose NEGATIVE  02/08/2016 08:22 PM    Ketone 40 (A) 02/08/2016 08:22 PM    Bilirubin LARGE (A) 08/16/2014 04:35 AM    Urobilinogen 1.0 02/08/2016 08:22 PM    Nitrites NEGATIVE  02/08/2016 08:22 PM    Leukocyte Esterase SMALL (A) 02/08/2016 08:22 PM    Epithelial cells FEW 02/08/2016 08:22 PM    Bacteria NEGATIVE  02/08/2016 08:22 PM    WBC 10-20 02/08/2016 08:22 PM    RBC 0-5 02/08/2016 08:22 PM         Medications Reviewed:     Current Facility-Administered Medications   Medication Dose Route Frequency    piperacillin-tazobactam (ZOSYN) 3.375 g in 0.9% sodium chloride (MBP/ADV) 100 mL MBP  3.375 g IntraVENous Q8H    sodium chloride (NS) flush 5-40 mL  5-40 mL IntraVENous Q8H    sodium chloride (NS) flush 5-40 mL  5-40 mL IntraVENous PRN    acetaminophen (TYLENOL) tablet 650 mg  650 mg Oral Q6H PRN    Or    acetaminophen (TYLENOL) suppository 650 mg  650 mg Rectal Q6H PRN    polyethylene glycol (MIRALAX) packet 17 g  17 g Oral DAILY PRN    ondansetron (ZOFRAN ODT) tablet 4 mg  4 mg Oral Q8H PRN    Or    ondansetron (ZOFRAN) injection 4 mg  4 mg IntraVENous Q6H PRN    enoxaparin (LOVENOX) injection 40 mg  40 mg SubCUTAneous DAILY    HYDROmorphone (DILAUDID) injection 0.4 mg  0.4 mg IntraVENous Q4H PRN ______________________________________________________________________  EXPECTED LENGTH OF STAY: - - -  ACTUAL LENGTH OF STAY:          [address] [facility]  katherine

## 2021-10-11 NOTE — H&P
History and Physical    Patient: Martin Adair. MRN: [id number]  SSN: [id number]    YOB: 1955  Age: 77 y.o. Sex: male        Assessment/plan:   1. Sepsis with intra-abdominal fluid collection. Patient has been started on IV Zosyn in the emergency department. Surgery consultation requested. Per radiology protection, fluid collection would probably not be able to be reached through percutaneous intervention. 2.  Chronic pancreatitis. 3.  Severe biliary strictures, s/p choledochojejunostomy on 2/1/2016    Subjective:      Martin Adair. is a 77 y.o. male who has a history of chronic pancreatitis. Patient is also s/p choledochojejunostomy for biliary stricture. He states that he has had pain secondary to chronic pancreatitis for the last 20 years. He states that he will take Tylenol for this kind of pain, and if he had Dilaudid available, he would take Dilaudid in acute exacerbations. He states that he presents his pain due to acute on chronic pancreatitis typically once or twice a year, but up to 4 times a year. He that he had a prescription for 25 pills of Dilaudid that was prescribed 10 months ago, and ran out 2 weeks ago. He states that he then got a prescription for 21 pills of hydromorphone 1 week ago, and has used 14 of them over the past week. Patient states that he has been having epigastric pain over the past month that has gotten exponentially worse over the past 2 weeks. He states that it \"feels like fire, like somebodys on the inside scraping it with a fork. Patient also has pain in the bilateral lower flank area.  Patient states that the bilateral lower flank pain is not always associated with the epigastric pain. However, he also stated that the bilateral lower flank pain tends to radiate upwards towards the mid back which is directly behind the epigastrium. Patient states that the bilateral flank pain felt like somebody beat him up.   The duration of pain varied-- would last 1 hour and sometimes a day or two, unrelieved by pain medication. Sometimes, the  Dilaudid would not touch it. About 30 minutes to an hour after eating, he would notice a little bit of increase in the abdominal pain. Patient states that p.o. intake has been mostly drinking water and ensure. Patient states that he was trying to avoid solid foods and sticking to liquids. He states that drinking liquids actually made the pain feel better but solid food would make it worse. Patient has had nausea but no vomiting. About a month ago, patient had one episode of emesis. Vomiting was clear without any blood. He denies coffee ground emesis. He denies melena or hematochezia. [facility] states that he has had a fever occasionally over the last 2 weeks. He states that he had a fever of 101.8 °F at 4:30 PM tonight. Patient states that he believes that his temperature went up when he ate. He states that he would occasionally start sweating when he would eat. He states that his abdominal girth is at baseline. Past Medical History:   Diagnosis Date    Adverse effect of anesthesia     woke up during ERCP - gasping    Bacteremia 08/2014    Bacteremia: 2/3 bottles with Klebsiella Oxytoca, pansusceptible and Streptococcus species    Bile duct stricture 2016    s/p choledochojejunostomy on 2/1/2016 for biliary stricture    Chronic pancreatitis ([facility] [address]     Depression     Dilated pancreatic duct     CT of the abdomen/pelvis on 8/31/2018 showed persistent pancreatic atrophy and pancreatic ductal dilatation to 1 cm. Punctate calcifications in the pancreatic head are unchanged. New 1.5 x 1.4 x 1.4 cm hypodense structure in the inferior medial pancreatic head suggesting a cyst    GERD (gastroesophageal reflux disease)     Thompson's Esophagus    Insomnia     Pancreatic cyst     CT of the abdomen/pelvis on 8/31/2018 showed persistent pancreatic atrophy and pancreatic ductal dilatation to 1 cm.  Punctate calcifications in the pancreatic head are unchanged. New 1.5 x 1.4 x 1.4 cm hypodense structure in the inferior medial pancreatic head      Past Surgical History:   Procedure Laterality Date    HX CHOLECYSTECTOMY  02/01/2016    Cholecystectomy and choledochojejunostomy for biliary stricture on 2/1/2016; also had removal of  inlying metal stent for CBD stricture, by Dr Red Chen      Age 2, had left inguinal hernia repair    HX ORTHOPAEDIC      ganglion cyst removed left wrist x 2    HX ORTHOPAEDIC      left knee fracture 2013-1/30    HX ORTHOPAEDIC      meniscus repair (2) on left knee    HX OTHER SURGICAL      ERCPS -multiple stents placement and removal since 1995 most due to biliary stricture; last ERCP with stent 4/2015    HX SMALL BOWEL RESECTION  02/09/2016    1 week after Cholecystectomy and choledochojejunostomy, small-bowel obstruction secondary to adhesions and necrosis of the most proximal portion of the alimentary portion of Frankie limb and jejunojejunostomy. Patient had ex lap with  Resection of the necrotic proximal small bowel and oversewing of the enteroenterotomy closure.  HX TONSILLECTOMY      VASCULAR SURGERY PROCEDURE UNLIST      vein stripped from left leg      Family History   Problem Relation Age of Onset    Stroke Mother         ?  Stroke Father     Other Brother         Agent Orange    Stroke Brother     Alcohol abuse Maternal Uncle     Anesth Problems Neg Hx      Social History     Tobacco Use    Smoking status: Former Smoker     Packs/day: 0.50     Years: 20.00     Pack years: 10.00    Smokeless tobacco: Never Used   Substance Use Topics    Alcohol use: Not Currently     Comment: History of heavy alcohol use: 6 beers on most days x30 years. Stopped in his 46s. Prior to Admission medications    Medication Sig Authorizing Provider   aspirin delayed-release 81 mg tablet Take  by mouth daily.  Provider, Historical LIPASE/PROTEASE/AMYLASE (CREON PO) Take  by mouth. 27362 units po with a small meal. Provider, Historical   LIPASE/PROTEASE/AMYLASE (CREON PO) Take  by mouth. 10773 units po with a snack. Provider, Historical   omeprazole (PRILOSEC) 20 mg capsule Take 20 mg by mouth daily. Provider, Historical   FLUoxetine (PROZAC) 20 mg tablet Take 20 mg by mouth daily. Provider, Historical   LIPASE/PROTEASE/AMYLASE (CREON PO) Take  by mouth. 02764 units po with large meal. Provider, Historical        Allergies   Allergen Reactions    Aspirin Other (comments)     Does not take d/t chronic pancreatitis. Pt has begun to take  81 mg ASA due to 'blockages' in his heart. Review of Systems:  A comprehensive review of systems was negative except for that written in the History of Present Illness. Objective:     Patient Vitals for the past 24 hrs:   BP Temp Pulse Resp SpO2  oxygen therapy   10/10/21 2300 (!) 126/54 98.7 °F (37.1 °C) 91 18 95 % Room air   10/10/21 2223     98 % Room air   10/10/21 2145 118/73  86 11 98 % Room air   10/10/21 1937 136/74 99.4 °F (37.4 °C) 94 18 99 % Room air   10/10/21 1815 118/72 100.1 °F (37.8 °C) (!) 102 21 100 % Room air        Physical Exam:  Estimated body mass index is 21.84 kg/m² as calculated from the following:    Height as of this encounter: 6' (1.829 m). Weight as of this encounter: 73 kg (161 lb). General: In no acute distress. Well developed, well nourished. Head: Normocephalic, atraumatic. Eyes: Anicteric sclera. PERRL. Extraocular muscles intact. ENT: External ears and nose appear normal.  Oral mucosa moist.  Neck: Supple. No jugular venous distention. Heart: Regular rate and rhythm. No murmurs appreciated. Chest: Symmetrical excursion. Clear to auscultation bilaterally. Abdomen: Soft, tender to palpation in the epigastrium. No peritoneal signs. No abnormal distention. Bowel sounds are present throughout. Extremities: No gross deformities.   No edema, no cyanosis. Feet are warm to touch. Neurological: No lateralizing deficits. Alert, oriented X3. Skin: No jaundice. No rashes. Patient resides:  Independently x   Assisted Living     SNF     With family care        Ambulates:   Independently x   w/cane     w/walker     w/wc     CODE STATUS:  DNR     Full x   Other          Diagnostic data:     Recent Results (from the past 24 hour(s))   EKG, 12 LEAD, INITIAL    Collection Time: 10/10/21  6:19 PM   Result Value Ref Range    Ventricular Rate 98 BPM    Atrial Rate 98 BPM    P-R Interval 146 ms    QRS Duration 90 ms    Q-T Interval 394 ms    QTC Calculation (Bezet) 503 ms    Calculated P Axis 73 degrees    Calculated R Axis 67 degrees    Calculated T Axis 46 degrees    Diagnosis       Normal sinus rhythm  Possible Left atrial enlargement  Left ventricular hypertrophy with repolarization abnormality ( Sokolow-Desai )  Prolonged QT  Abnormal ECG  When compared with ECG of 31-AUG-2018 18:09,  Significant changes have occurred     CBC WITH AUTOMATED DIFF    Collection Time: 10/10/21  6:26 PM   Result Value Ref Range    WBC 14.7 (H) 4.1 - 11.1 K/uL    RBC 3.01 (L) 4.10 - 5.70 M/uL    HGB 8.6 (L) 12.1 - 17.0 g/dL    HCT 25.5 (L) 36.6 - 50.3 %    MCV 84.7 80.0 - 99.0 FL    MCH 28.6 26.0 - 34.0 PG    MCHC 33.7 30.0 - 36.5 g/dL    RDW 13.6 11.5 - 14.5 %    PLATELET 512 (H) 764 - 400 K/uL    MPV 8.9 8.9 - 12.9 FL    NRBC 0.0 0  WBC    ABSOLUTE NRBC 0.00 0.00 - 0.01 K/uL    NEUTROPHILS 82 (H) 32 - 75 %    LYMPHOCYTES 10 (L) 12 - 49 %    MONOCYTES 7 5 - 13 %    EOSINOPHILS 0 0 - 7 %    BASOPHILS 0 0 - 1 %    IMMATURE GRANULOCYTES 1 (H) 0.0 - 0.5 %    ABS. NEUTROPHILS 12.0 (H) 1.8 - 8.0 K/UL    ABS. LYMPHOCYTES 1.5 0.8 - 3.5 K/UL    ABS. MONOCYTES 1.0 0.0 - 1.0 K/UL    ABS. EOSINOPHILS 0.0 0.0 - 0.4 K/UL    ABS. BASOPHILS 0.1 0.0 - 0.1 K/UL    ABS. IMM.  GRANS. 0.2 (H) 0.00 - 0.04 K/UL    DF AUTOMATED     METABOLIC PANEL, COMPREHENSIVE    Collection Time: 10/10/21 6:26 PM   Result Value Ref Range    Sodium 130 (L) 136 - 145 mmol/L    Potassium 2.6 (LL) 3.5 - 5.1 mmol/L    Chloride 94 (L) 97 - 108 mmol/L    CO2 27 21 - 32 mmol/L    Anion gap 9 5 - 15 mmol/L    Glucose 168 (H) 65 - 100 mg/dL    BUN 14 6 - 20 MG/DL    Creatinine 1.13 0.70 - 1.30 MG/DL    BUN/Creatinine ratio 12 12 - 20      GFR est AA >60 >60 ml/min/1.73m2    GFR est non-AA >60 >60 ml/min/1.73m2    Calcium 8.9 8.5 - 10.1 MG/DL    Bilirubin, total 0.7 0.2 - 1.0 MG/DL    ALT (SGPT) 15 12 - 78 U/L    AST (SGOT) 19 15 - 37 U/L    Alk. phosphatase 88 45 - 117 U/L    Protein, total 7.6 6.4 - 8.2 g/dL    Albumin 2.5 (L) 3.5 - 5.0 g/dL    Globulin 5.1 (H) 2.0 - 4.0 g/dL    A-G Ratio 0.5 (L) 1.1 - 2.2     TROPONIN-HIGH SENSITIVITY    Collection Time: 10/10/21  6:26 PM   Result Value Ref Range    Troponin-High Sensitivity 10 0 - 76 ng/L   LIPASE    Collection Time: 10/10/21  6:26 PM   Result Value Ref Range    Lipase 24 (L) 73 - 393 U/L   SAMPLES BEING HELD    Collection Time: 10/10/21  6:26 PM   Result Value Ref Range    SAMPLES BEING HELD 1 red 1blue      COMMENT        Add-on orders for these samples will be processed based on acceptable specimen integrity and analyte stability, which may vary by analyte. CULTURE, BLOOD, PAIRED    Collection Time: 10/10/21  9:02 PM    Specimen: Blood   Result Value Ref Range    Special Requests: NO SPECIAL REQUESTS      Culture result: NO GROWTH AFTER 6 HOURS     LACTIC ACID    Collection Time: 10/10/21  9:02 PM   Result Value Ref Range    Lactic acid 0.6 0.4 - 2.0 MMOL/L       CT ABD PELV W CONT  Narrative: CLINICAL HISTORY: Abdominal pain    INDICATION: epigastric and mid abdominal pain for one week. Vomiting today. History of biliary bypass 3 years ago. COMPARISON: 12/23/2020  CONTRAST:  100 mL of Isovue-370. TECHNIQUE:   Following the uneventful intravenous administration of contrast, thin axial  images were obtained through the abdomen and pelvis.  Coronal and sagittal  reconstructions were generated. Oral contrast was not administered. CT dose  reduction was achieved through use of a standardized protocol tailored for this  examination and automatic exposure control for dose modulation. FINDINGS:   LUNG BASES: Clear. INCIDENTALLY IMAGED HEART AND MEDIASTINUM: Unremarkable. LIVER: Portal vein is patent. No mass lesion. Intrahepatic pneumobilia. GALLBLADDER: Prior cholecystectomy. SPLEEN: Splenomegaly. PANCREAS: Persistent pancreatic atrophy and pancreatic ductal dilatation to 1  cm. Punctate calcifications in the pancreatic head are unchanged. Pancreatic  duct calcification is chronic in nature. ADRENALS: Unremarkable. KIDNEYS: No mass, calculus, or hydronephrosis. Right renal cyst.  STOMACH: Hyperemic mucosa. Fluid-filled. SMALL BOWEL: Small bowel ventral hernia with neck measurement of 4.4 cm. COLON: There is ascending colonic wall thickening. There is mild transverse  colonic wall thickening. There are fluid-filled loops of large bowel. APPENDIX: Unremarkable. Normal appendix  PERITONEUM: There is a multiloculated and septated irregular fluid collection in  the left upper quadrant. There is a component this within the gastrohepatic  ligament but also components adjacent to the tail of the pancreas. Multiseptated. RETROPERITONEUM: Peripancreatic septated and loculated collection wraps around  the tail of the pancreas and inferior to the gastric fundus. REPRODUCTIVE ORGANS: Small prostate  URINARY BLADDER: No mass or calculus. BONES: No destructive bone lesion. Disc bulge at L2-3. ADDITIONAL COMMENTS: N/A  Impression: Multiloculated complex and septated collection adjacent to the tail of the  pancreas and the gastric fundus. Most likely infectious im etiology with partial  rim enhancement. Not amenable to percutaneous drainage. Moderate to severe ascending colonic wall thickening with mild transverse  colonic wall thickening as well.  Imaging findings suggestive of a moderate to  severe colitis. Consider infectious/inflammatory etiologies. Chronic calcific pancreatitis with gland atrophy and pancreatic ductal  dilatation. Splenomegaly. Other findings are stable and/or nonemergent.       Signed By: Venice Mendez DO     October 11, 2021

## 2021-10-11 NOTE — CONSULTS
[facility]  Inpatient Consultation        Admit Date: 10/10/2021  Reason for Consultation: intraabdominal fluid montse'n    HPI:  Monse Cade. is a 77 y.o. male who is s/p choledochojejunostomy d/t stricture of CBD from chronic pancreatitis by Dr Gary Dandy in Feb of 2016 whom we are asked to see in consultation by Dr. Pradeep Ring for the above complaint. Pt presented to the ED w/ a 2 day hx of epigastric pain a/w nausea. He has bouts of pancreatitis 2-4x per year. Most of them he manages at home. He does have some dilaudid that he uses w/ the pain but ran out. He also says he has had fevers on and off over the past week as high as 102. He was admitted w/ an intra abdominal fluid collection, see CT below. He is on zosyn. Pain is better but also taking IV dilaudid. No n/v, having some fevers to 100. Lipase is nl      IMPRESSION  Multiloculated complex and septated collection adjacent to the tail of the  pancreas and the gastric fundus. Most likely infectious im etiology with partial  rim enhancement. Not amenable to percutaneous drainage. Moderate to severe ascending colonic wall thickening with mild transverse  colonic wall thickening as well. Imaging findings suggestive of a moderate to  severe colitis. Consider infectious/inflammatory etiologies. Chronic calcific pancreatitis with gland atrophy and pancreatic ductal  dilatation. Splenomegaly.    Other findings are stable and/or nonemergent.          Patient Active Problem List    Diagnosis Date Noted    Sepsis ([address] 10/10/2021    Incisional hernia, without obstruction or gangrene 08/22/2018    Pancreatic duct obstruction 02/08/2017    SBO (small bowel obstruction) ([address] 02/12/2016    Dehydration 02/08/2016    Temporary high blood pressure 02/02/2016    GERD (gastroesophageal reflux disease) 02/02/2016    Bile duct stricture 01/27/2016    Abdominal pain 08/14/2014     Past Medical History:   Diagnosis Date    Adverse effect of anesthesia     woke up during ERCP - gasping    Bacteremia 08/2014    Bacteremia: 2/3 bottles with Klebsiella Oxytoca, pansusceptible and Streptococcus species    Bile duct stricture 2016    s/p choledochojejunostomy on 2/1/2016 for biliary stricture    Chronic pancreatitis ([address]     Depression     Dilated pancreatic duct     CT of the abdomen/pelvis on 8/31/2018 showed persistent pancreatic atrophy and pancreatic ductal dilatation to 1 cm. Punctate calcifications in the pancreatic head are unchanged. New 1.5 x 1.4 x 1.4 cm hypodense structure in the inferior medial pancreatic head suggesting a cyst    GERD (gastroesophageal reflux disease)     Thompson's Esophagus    Insomnia     Pancreatic cyst     CT of the abdomen/pelvis on 8/31/2018 showed persistent pancreatic atrophy and pancreatic ductal dilatation to 1 cm. Punctate calcifications in the pancreatic head are unchanged. New 1.5 x 1.4 x 1.4 cm hypodense structure in the inferior medial pancreatic head       Past Surgical History:   Procedure Laterality Date    HX CHOLECYSTECTOMY  02/01/2016    Cholecystectomy and choledochojejunostomy for biliary stricture on 2/1/2016; also had removal of  inlying metal stent for CBD stricture, by Dr Trixie Batista      Age 2, had left inguinal hernia repair    HX ORTHOPAEDIC      ganglion cyst removed left wrist x 2    HX ORTHOPAEDIC      left knee fracture 2013-1/30    HX ORTHOPAEDIC      meniscus repair (2) on left knee    HX OTHER SURGICAL      ERCPS -multiple stents placement and removal since 1995 most due to biliary stricture; last ERCP with stent 4/2015    HX SMALL BOWEL RESECTION  02/09/2016    1 week after Cholecystectomy and choledochojejunostomy, small-bowel obstruction secondary to adhesions and necrosis of the most proximal portion of the alimentary portion of Frankie limb and jejunojejunostomy.   Patient had ex lap with  Resection of the necrotic proximal small bowel and oversewing of the enteroenterotomy closure.  HX TONSILLECTOMY      VASCULAR SURGERY PROCEDURE UNLIST      vein stripped from left leg      Social History     Tobacco Use    Smoking status: Former Smoker     Packs/day: 0.50     Years: 20.00     Pack years: 10.00    Smokeless tobacco: Never Used   Substance Use Topics    Alcohol use: Not Currently     Comment: History of heavy alcohol use: 6 beers on most days x30 years. Stopped in his 46s. Family History   Problem Relation Age of Onset    Stroke Mother         ?  Stroke Father     Other Brother         Agent Orange    Stroke Brother     Alcohol abuse Maternal Uncle     Anesth Problems Neg Hx       Prior to Admission medications    Medication Sig Start Date End Date Taking? Authorizing Provider   aspirin delayed-release 81 mg tablet Take  by mouth daily. Provider, Historical   LIPASE/PROTEASE/AMYLASE (CREON PO) Take  by mouth. 16531 units po with a small meal.    Provider, Historical   LIPASE/PROTEASE/AMYLASE (CREON PO) Take  by mouth. 49453 units po with a snack. Provider, Historical   omeprazole (PRILOSEC) 20 mg capsule Take 20 mg by mouth daily. Provider, Historical   FLUoxetine (PROZAC) 20 mg tablet Take 20 mg by mouth daily. Provider, Historical   LIPASE/PROTEASE/AMYLASE (CREON PO) Take  by mouth.  38135 units po with large meal.    Provider, Historical     Current Facility-Administered Medications   Medication Dose Route Frequency    piperacillin-tazobactam (ZOSYN) 3.375 g in 0.9% sodium chloride (MBP/ADV) 100 mL MBP  3.375 g IntraVENous Q8H    sodium chloride (NS) flush 5-40 mL  5-40 mL IntraVENous Q8H    sodium chloride (NS) flush 5-40 mL  5-40 mL IntraVENous PRN    acetaminophen (TYLENOL) tablet 650 mg  650 mg Oral Q6H PRN    Or    acetaminophen (TYLENOL) suppository 650 mg  650 mg Rectal Q6H PRN    polyethylene glycol (MIRALAX) packet 17 g  17 g Oral DAILY PRN    ondansetron (ZOFRAN ODT) tablet 4 mg  4 mg Oral Q8H PRN    Or    ondansetron (ZOFRAN) injection 4 mg  4 mg IntraVENous Q6H PRN    enoxaparin (LOVENOX) injection 40 mg  40 mg SubCUTAneous DAILY    HYDROmorphone (DILAUDID) injection 0.4 mg  0.4 mg IntraVENous Q4H PRN     Allergies   Allergen Reactions    Aspirin Other (comments)     Does not take d/t chronic pancreatitis. Pt has begun to take  81 mg ASA due to 'blockages' in his heart. Subjective:     Review of Systems:    A comprehensive review of systems was negative except for that written in the History of Present Illness. Objective:     Blood pressure 121/71, pulse 80, temperature 100.1 °F (37.8 °C), resp. rate 16, height 6' (1.829 m), weight 161 lb (73 kg), SpO2 98 %. Temp (24hrs), Av.4 °F (37.4 °C), Min:98.7 °F (37.1 °C), Max:100.1 °F (37.8 °C)      Recent Labs     10/11/21  0634 10/10/21  1826   WBC 13.1* 14.7*   HGB 7.5* 8.6*   HCT 22.1* 25.5*    487*     Recent Labs     10/11/21  0634 10/10/21  1826   * 130*   K 3.1* 2.6*   CL 98 94*   CO2 28 27   * 168*   BUN 12 14   CREA 0.85 1.13   CA 8.4* 8.9   ALB 2.1* 2.5*   TBILI 0.7 0.7   ALT 15 15     Recent Labs     10/10/21  1826   LPSE 24*         Intake/Output Summary (Last 24 hours) at 10/11/2021 1009  Last data filed at 10/10/2021 2111  Gross per 24 hour   Intake 1100 ml   Output    Net 1100 ml       _____________________  Physical Exam:     General:  Alert, cooperative, no distress, appears stated age, thin   Eyes:   Sclera clear. Throat: Lips, mucosa, and tongue normal.   Neck: Supple, symmetrical, trachea midline. Lungs:   Clear to auscultation bilaterally. Heart:  Regular rate and rhythm. Abdomen:   Normal BS, flat, Soft, mild epigastric tenderness; +ventral hernia; No organomegaly. Extremities: Extremities normal, atraumatic, no cyanosis or edema. Skin: Skin color, texture, turgor normal. No rashes or lesions.              Assessment:   Active Problems:    Sepsis ([address] (10/10/2021)            Plan:     Intra abd abscess not amenable to perc drainage  Would cont abx  Interval CT in 2-3 days  May have sips of clears  Dr Frank Napoles to see    Thank you for allowing us to participate in the care of this patient. Total time spent with patient: 30 minutes. Signed By: Deanne Daniel NP     October 11, 2021    ATTENDING ADDENDUM  I supervised the APC and reviewed the note. We discussed the plan of care  He clearly has a pancreatic abscess, infected pseudocyst, or whatever you wish to call it. Antibiotics are a good start. If he doesn't show some resolution of his fever and pain I would recommend draining and debriding the necrotic debris. Will follow along with you and see where we go over the next 24-48 hours.

## 2021-10-11 NOTE — PROGRESS NOTES
Patient's /77 and temp 101.8. Patient's MEWs of 3. RN administered tylenol and dilaudid. Gurinder Haskins MD notified.

## 2021-10-12 LAB
ANION GAP SERPL CALC-SCNC: 7 MMOL/L (ref 5–15)
ATRIAL RATE: 98 BPM
BUN SERPL-MCNC: 13 MG/DL (ref 6–20)
BUN/CREAT SERPL: 17 (ref 12–20)
CALCIUM SERPL-MCNC: 8.7 MG/DL (ref 8.5–10.1)
CALCULATED P AXIS, ECG09: 73 DEGREES
CALCULATED R AXIS, ECG10: 67 DEGREES
CALCULATED T AXIS, ECG11: 46 DEGREES
CHLORIDE SERPL-SCNC: 99 MMOL/L (ref 97–108)
CO2 SERPL-SCNC: 26 MMOL/L (ref 21–32)
CREAT SERPL-MCNC: 0.77 MG/DL (ref 0.7–1.3)
DIAGNOSIS, 93000: NORMAL
ERYTHROCYTE [DISTWIDTH] IN BLOOD BY AUTOMATED COUNT: 14 % (ref 11.5–14.5)
GLUCOSE SERPL-MCNC: 84 MG/DL (ref 65–100)
HCT VFR BLD AUTO: 22.5 % (ref 36.6–50.3)
HGB BLD-MCNC: 7.5 G/DL (ref 12.1–17)
MAGNESIUM SERPL-MCNC: 1.4 MG/DL (ref 1.6–2.4)
MCH RBC QN AUTO: 28.5 PG (ref 26–34)
MCHC RBC AUTO-ENTMCNC: 33.3 G/DL (ref 30–36.5)
MCV RBC AUTO: 85.6 FL (ref 80–99)
NRBC # BLD: 0 K/UL (ref 0–0.01)
NRBC BLD-RTO: 0 PER 100 WBC
P-R INTERVAL, ECG05: 146 MS
PHOSPHATE SERPL-MCNC: 2.4 MG/DL (ref 2.6–4.7)
PLATELET # BLD AUTO: 411 K/UL (ref 150–400)
PMV BLD AUTO: 9.1 FL (ref 8.9–12.9)
POTASSIUM SERPL-SCNC: 4 MMOL/L (ref 3.5–5.1)
Q-T INTERVAL, ECG07: 394 MS
QRS DURATION, ECG06: 90 MS
QTC CALCULATION (BEZET), ECG08: 503 MS
RBC # BLD AUTO: 2.63 M/UL (ref 4.1–5.7)
SODIUM SERPL-SCNC: 132 MMOL/L (ref 136–145)
VENTRICULAR RATE, ECG03: 98 BPM
WBC # BLD AUTO: 15.3 K/UL (ref 4.1–11.1)

## 2021-10-12 PROCEDURE — 77030027138 HC INCENT SPIROMETER -A

## 2021-10-12 PROCEDURE — C9113 INJ PANTOPRAZOLE SODIUM, VIA: HCPCS | Performed by: NURSE PRACTITIONER

## 2021-10-12 PROCEDURE — 84100 ASSAY OF PHOSPHORUS: CPT

## 2021-10-12 PROCEDURE — 83735 ASSAY OF MAGNESIUM: CPT

## 2021-10-12 PROCEDURE — 99231 SBSQ HOSP IP/OBS SF/LOW 25: CPT | Performed by: NURSE PRACTITIONER

## 2021-10-12 PROCEDURE — 65660000000 HC RM CCU STEPDOWN

## 2021-10-12 PROCEDURE — 85027 COMPLETE CBC AUTOMATED: CPT

## 2021-10-12 PROCEDURE — 74011000258 HC RX REV CODE- 258: Performed by: INTERNAL MEDICINE

## 2021-10-12 PROCEDURE — 74011250636 HC RX REV CODE- 250/636: Performed by: INTERNAL MEDICINE

## 2021-10-12 PROCEDURE — 74011250636 HC RX REV CODE- 250/636: Performed by: NURSE PRACTITIONER

## 2021-10-12 PROCEDURE — 74011000250 HC RX REV CODE- 250: Performed by: NURSE PRACTITIONER

## 2021-10-12 PROCEDURE — 36415 COLL VENOUS BLD VENIPUNCTURE: CPT

## 2021-10-12 PROCEDURE — 74011250637 HC RX REV CODE- 250/637: Performed by: INTERNAL MEDICINE

## 2021-10-12 PROCEDURE — 80048 BASIC METABOLIC PNL TOTAL CA: CPT

## 2021-10-12 RX ORDER — MAGNESIUM SULFATE HEPTAHYDRATE 40 MG/ML
2 INJECTION, SOLUTION INTRAVENOUS ONCE
Status: COMPLETED | OUTPATIENT
Start: 2021-10-12 | End: 2021-10-12

## 2021-10-12 RX ADMIN — MAGNESIUM SULFATE HEPTAHYDRATE 2 G: 40 INJECTION, SOLUTION INTRAVENOUS at 10:08

## 2021-10-12 RX ADMIN — PIPERACILLIN AND TAZOBACTAM 3.38 G: 3; .375 INJECTION, POWDER, LYOPHILIZED, FOR SOLUTION INTRAVENOUS at 16:31

## 2021-10-12 RX ADMIN — PIPERACILLIN AND TAZOBACTAM 3.38 G: 3; .375 INJECTION, POWDER, LYOPHILIZED, FOR SOLUTION INTRAVENOUS at 10:08

## 2021-10-12 RX ADMIN — HYDROMORPHONE HYDROCHLORIDE 0.4 MG: 1 INJECTION, SOLUTION INTRAMUSCULAR; INTRAVENOUS; SUBCUTANEOUS at 23:52

## 2021-10-12 RX ADMIN — ACETAMINOPHEN 650 MG: 325 TABLET ORAL at 04:12

## 2021-10-12 RX ADMIN — ONDANSETRON HYDROCHLORIDE 4 MG: 2 INJECTION, SOLUTION INTRAMUSCULAR; INTRAVENOUS at 04:12

## 2021-10-12 RX ADMIN — ENOXAPARIN SODIUM 40 MG: 100 INJECTION SUBCUTANEOUS at 10:08

## 2021-10-12 RX ADMIN — HYDROMORPHONE HYDROCHLORIDE 0.4 MG: 1 INJECTION, SOLUTION INTRAMUSCULAR; INTRAVENOUS; SUBCUTANEOUS at 04:34

## 2021-10-12 RX ADMIN — PIPERACILLIN AND TAZOBACTAM 3.38 G: 3; .375 INJECTION, POWDER, LYOPHILIZED, FOR SOLUTION INTRAVENOUS at 02:16

## 2021-10-12 RX ADMIN — ACETAMINOPHEN 650 MG: 325 TABLET ORAL at 20:44

## 2021-10-12 RX ADMIN — SODIUM CHLORIDE 40 MG: 9 INJECTION INTRAMUSCULAR; INTRAVENOUS; SUBCUTANEOUS at 16:31

## 2021-10-12 NOTE — PROGRESS NOTES
Bedside shift change report given to Swapna Pichardo RN (oncoming nurse) by Galo Ramirez RN (offgoing nurse). Report included the following information SBAR, Kardex, Intake/Output, MAR and Recent Results.

## 2021-10-12 NOTE — PROGRESS NOTES
General Surgery Daily Progress Note    Admit Date: 10/10/2021  Post-Operative Day: * No surgery found * from * No surgery found *     Subjective:     Last 24 hrs: pt still having fevers, leukocytosis. He had dry heaves last night and this am.  No c/o abd pain at present       Objective:     Blood pressure 108/68, pulse 68, temperature 98.1 °F (36.7 °C), resp. rate 18, height 6' (1.829 m), weight 161 lb (73 kg), SpO2 98 %. Temp (24hrs), Av.8 °F (37.7 °C), Min:98.1 °F (36.7 °C), Max:101.8 °F (38.8 °C)      _____________________  Physical Exam:     Alert and Oriented, x3, in no acute distress.   Cardiovascular: RRR, no peripheral edema  Abdomen: flat, NT, +BS, +umbilical hernia      Assessment:   Active Problems:    Sepsis ([address] (10/10/2021)            Plan:     Monitor fevers/wbc  Can have ice chips  Cont abx  PPI  Am labs    Data Review:    Recent Labs     10/12/21  0215 10/11/21  0634 10/10/21  1826   WBC 15.3* 13.1* 14.7*   HGB 7.5* 7.5* 8.6*   HCT 22.5* 22.1* 25.5*   * 396 487*     Recent Labs     10/12/21  0215 10/11/21  0634 10/10/21  1826   * 133* 130*   K 4.0 3.1* 2.6*   CL 99 98 94*   CO2 26 28 27   GLU 84 106* 168*   BUN 13 12 14   CREA 0.77 0.85 1.13   CA 8.7 8.4* 8.9   MG 1.4*  --   --    PHOS 2.4*  --   --    ALB  --  2.1* 2.5*   ALT  --  15 15     Recent Labs     10/10/21  1826   LPSE 24*           ______________________  Medications:    Current Facility-Administered Medications   Medication Dose Route Frequency    magnesium sulfate 2 g/50 ml IVPB (premix or compounded)  2 g IntraVENous ONCE    piperacillin-tazobactam (ZOSYN) 3.375 g in 0.9% sodium chloride (MBP/ADV) 100 mL MBP  3.375 g IntraVENous Q8H    0.9% sodium chloride infusion  75 mL/hr IntraVENous CONTINUOUS    melatonin tablet 3 mg  3 mg Oral QHS    sodium chloride (NS) flush 5-40 mL  5-40 mL IntraVENous Q8H    sodium chloride (NS) flush 5-40 mL  5-40 mL IntraVENous PRN    acetaminophen (TYLENOL) tablet 650 mg  650 mg Oral Q6H PRN    Or    acetaminophen (TYLENOL) suppository 650 mg  650 mg Rectal Q6H PRN    polyethylene glycol (MIRALAX) packet 17 g  17 g Oral DAILY PRN    ondansetron (ZOFRAN ODT) tablet 4 mg  4 mg Oral Q8H PRN    Or    ondansetron (ZOFRAN) injection 4 mg  4 mg IntraVENous Q6H PRN    enoxaparin (LOVENOX) injection 40 mg  40 mg SubCUTAneous DAILY    HYDROmorphone (DILAUDID) injection 0.4 mg  0.4 mg IntraVENous Q4H PRN       Vishal Zhu NP  10/12/2021  ATTENDING ADDENDUM  I supervised the APC and reviewed the note. We discussed the plan of care  Saw him last evening. He is still febrile but does feel a little better. If he continues this febrile course I think we should operatively drain his abscess and debride any necrotic pancreas as well. Will tentatively plan for this Thursday if OK with everyone.

## 2021-10-12 NOTE — PROGRESS NOTES
Bedside and Verbal shift change report given to Aide Alaniz (oncoming nurse) by Kerri Jones (offgoing nurse). Report included the following information SBAR, Kardex, Intake/Output, MAR and Recent Results.

## 2021-10-12 NOTE — PROGRESS NOTES
[facility]  Hospitalist Group                                                                                          Hospitalist Progress Note  [address] [facility],   Answering service: [phone] from in house phone        Date of Service:  10/12/2021  NAME:  Emeka Koehler. :  1955  MRN:  [id number]      Admission Summary:   77year old with past medical history chronic pancreatitis, s/p choledochojejunostomy for biliary stricture. Here with sepsis and found to have multiloculated abdominal fluid collection.          Interval history / Subjective: Follow up sepsis/fluid collection. Patient seen and examined. Nausea this AM with \"dry heaving\" per RN. Pain controlled with medications. +flatus. Fevers persist, leukocytosis worse      Assessment & Plan:     Sepsis (fever, leukocytosis, tachycardia) with abdominal abscess in setting of chronic pancreatitis:  -CT abd/pelvis with multiloculated complex and septated collection adjacent to the tail of the pancreas and the gastric fundus  -general surgery consulted. Appreciate recs.  Suspected infected pancreatic pseudocyst  -continue zosyn  -follow blood cultures  -pain control, antiemetics  -ice chips    Hyponatremia:   -suspect due to volume depletion, initiate IVFs  Hypokalemia: replete   Hypomagnesemia: repleted   Anemia, normocytic: stable  -monitor closely, Hgb 7.5, transfuse for Hgb <7    Severe biliary strictures, s/p choledochojejunostomy on 2016    Code status: full   DVT prophylaxis: lovenox    Care Plan discussed with: Patient/Family and Nurse  Anticipated Disposition: Home w/Family  Anticipated Discharge: Greater than 48 hours     Hospital Problems  Date Reviewed: 2020        Codes Class Noted POA    Sepsis ([facility] [address] ICD-10-CM: A41.9  ICD-9-CM: 038.9, 995.91  10/10/2021 Unknown                Review of Systems:     Negative unless stated above     Vital Signs:    Last 24hrs VS reviewed since prior progress note. Most recent are:  Visit Vitals  /64   Pulse 68   Temp 97.9 °F (36.6 °C)   Resp 18   Ht 6' (1.829 m)   Wt 73 kg (161 lb)   SpO2 99%   BMI 21.84 kg/m²       No intake or output data in the 24 hours ending 10/12/21 1509     Physical Examination:     I had a face to face encounter with this patient and independently examined them on 10/12/2021 as outlined below:          Constitutional:  No acute distress, cooperative, pleasant    ENT:  Oral mucosa moist, oropharynx benign. Resp:  CTA bilaterally. No wheezing/rhonchi/rales. No accessory muscle use   CV:  Regular rhythm, normal rate, no murmurs, gallops, rubs    GI:  Soft, non distended, non tender to palpation. Well healed central vertical scar, No guarding normoactive bowel sounds, no hepatosplenomegaly     Musculoskeletal:  No edema, warm, 2+ pulses throughout    Neurologic:  Moves all extremities. Data Review:    Review and/or order of clinical lab test  Review and/or order of tests in the radiology section of CPT  Review and/or order of tests in the medicine section of CPT      Labs:     Recent Labs     10/12/21  0215 10/11/21  0634   WBC 15.3* 13.1*   HGB 7.5* 7.5*   HCT 22.5* 22.1*   * 396     Recent Labs     10/12/21  0215 10/11/21  0634 10/10/21  1826   * 133* 130*   K 4.0 3.1* 2.6*   CL 99 98 94*   CO2 26 28 27   BUN 13 12 14   CREA 0.77 0.85 1.13   GLU 84 106* 168*   CA 8.7 8.4* 8.9   MG 1.4*  --   --    PHOS 2.4*  --   --      Recent Labs     10/11/21  0634 10/10/21  1826   ALT 15 15   AP 79 88   TBILI 0.7 0.7   TP 6.6 7.6   ALB 2.1* 2.5*   GLOB 4.5* 5.1*   LPSE  --  24*     No results for input(s): INR, PTP, APTT, INREXT, INREXT in the last 72 hours. No results for input(s): FE, TIBC, PSAT, FERR in the last 72 hours. No results found for: FOL, RBCF   No results for input(s): PH, PCO2, PO2 in the last 72 hours. No results for input(s): CPK, CKNDX, TROIQ in the last 72 hours.     No lab exists for component: CPKMB  No results found for: CHOL, CHOLX, CHLST, CHOLV, HDL, HDLP, LDL, LDLC, DLDLP, TGLX, TRIGL, TRIGP, CHHD, CHHDX  Lab Results   Component Value Date/Time    Glucose (POC) 102 (H) 02/20/2016 06:17 AM    Glucose (POC) 136 (H) 02/19/2016 10:57 PM    Glucose (POC) 116 (H) 02/19/2016 04:44 PM    Glucose (POC) 132 (H) 02/19/2016 11:43 AM    Glucose (POC) 113 (H) 02/19/2016 06:34 AM     Lab Results   Component Value Date/Time    Color DARK YELLOW 02/08/2016 08:22 PM    Appearance CLOUDY (A) 02/08/2016 08:22 PM    Specific gravity 1.026 02/08/2016 08:22 PM    pH (UA) 5.5 02/08/2016 08:22 PM    Protein 100 (A) 02/08/2016 08:22 PM    Glucose NEGATIVE  02/08/2016 08:22 PM    Ketone 40 (A) 02/08/2016 08:22 PM    Bilirubin LARGE (A) 08/16/2014 04:35 AM    Urobilinogen 1.0 02/08/2016 08:22 PM    Nitrites NEGATIVE  02/08/2016 08:22 PM    Leukocyte Esterase SMALL (A) 02/08/2016 08:22 PM    Epithelial cells FEW 02/08/2016 08:22 PM    Bacteria NEGATIVE  02/08/2016 08:22 PM    WBC 10-20 02/08/2016 08:22 PM    RBC 0-5 02/08/2016 08:22 PM         Medications Reviewed:     Current Facility-Administered Medications   Medication Dose Route Frequency    pantoprazole (PROTONIX) 40 mg in 0.9% sodium chloride 10 mL injection  40 mg IntraVENous DAILY    piperacillin-tazobactam (ZOSYN) 3.375 g in 0.9% sodium chloride (MBP/ADV) 100 mL MBP  3.375 g IntraVENous Q8H    0.9% sodium chloride infusion  75 mL/hr IntraVENous CONTINUOUS    melatonin tablet 3 mg  3 mg Oral QHS    sodium chloride (NS) flush 5-40 mL  5-40 mL IntraVENous Q8H    sodium chloride (NS) flush 5-40 mL  5-40 mL IntraVENous PRN    acetaminophen (TYLENOL) tablet 650 mg  650 mg Oral Q6H PRN    Or    acetaminophen (TYLENOL) suppository 650 mg  650 mg Rectal Q6H PRN    polyethylene glycol (MIRALAX) packet 17 g  17 g Oral DAILY PRN    ondansetron (ZOFRAN ODT) tablet 4 mg  4 mg Oral Q8H PRN    Or    ondansetron (ZOFRAN) injection 4 mg  4 mg IntraVENous Q6H PRN    enoxaparin (LOVENOX) injection 40 mg  40 mg SubCUTAneous DAILY    HYDROmorphone (DILAUDID) injection 0.4 mg  0.4 mg IntraVENous Q4H PRN     ______________________________________________________________________  EXPECTED LENGTH OF STAY: 3d 12h  ACTUAL LENGTH OF STAY:          2                DO katherine Larkin

## 2021-10-13 ENCOUNTER — ANESTHESIA EVENT (OUTPATIENT)
Dept: SURGERY | Age: 66
DRG: 853 | End: 2021-10-13
Payer: MEDICARE

## 2021-10-13 PROBLEM — E43 SEVERE PROTEIN-CALORIE MALNUTRITION (HCC): Status: ACTIVE | Noted: 2021-10-13

## 2021-10-13 LAB
ANION GAP SERPL CALC-SCNC: 9 MMOL/L (ref 5–15)
BUN SERPL-MCNC: 12 MG/DL (ref 6–20)
BUN/CREAT SERPL: 18 (ref 12–20)
CALCIUM SERPL-MCNC: 8.1 MG/DL (ref 8.5–10.1)
CHLORIDE SERPL-SCNC: 100 MMOL/L (ref 97–108)
CO2 SERPL-SCNC: 24 MMOL/L (ref 21–32)
CREAT SERPL-MCNC: 0.66 MG/DL (ref 0.7–1.3)
GLUCOSE SERPL-MCNC: 103 MG/DL (ref 65–100)
MAGNESIUM SERPL-MCNC: 2 MG/DL (ref 1.6–2.4)
PHOSPHATE SERPL-MCNC: 2.7 MG/DL (ref 2.6–4.7)
POTASSIUM SERPL-SCNC: 4.4 MMOL/L (ref 3.5–5.1)
SODIUM SERPL-SCNC: 133 MMOL/L (ref 136–145)

## 2021-10-13 PROCEDURE — 83735 ASSAY OF MAGNESIUM: CPT

## 2021-10-13 PROCEDURE — 74011250637 HC RX REV CODE- 250/637: Performed by: INTERNAL MEDICINE

## 2021-10-13 PROCEDURE — 74011250636 HC RX REV CODE- 250/636: Performed by: INTERNAL MEDICINE

## 2021-10-13 PROCEDURE — 74011000250 HC RX REV CODE- 250: Performed by: NURSE PRACTITIONER

## 2021-10-13 PROCEDURE — 74011250636 HC RX REV CODE- 250/636: Performed by: NURSE PRACTITIONER

## 2021-10-13 PROCEDURE — 36415 COLL VENOUS BLD VENIPUNCTURE: CPT

## 2021-10-13 PROCEDURE — 84100 ASSAY OF PHOSPHORUS: CPT

## 2021-10-13 PROCEDURE — 74011000258 HC RX REV CODE- 258: Performed by: INTERNAL MEDICINE

## 2021-10-13 PROCEDURE — C9113 INJ PANTOPRAZOLE SODIUM, VIA: HCPCS | Performed by: NURSE PRACTITIONER

## 2021-10-13 PROCEDURE — 65660000000 HC RM CCU STEPDOWN

## 2021-10-13 PROCEDURE — 99232 SBSQ HOSP IP/OBS MODERATE 35: CPT | Performed by: NURSE PRACTITIONER

## 2021-10-13 PROCEDURE — 80048 BASIC METABOLIC PNL TOTAL CA: CPT

## 2021-10-13 RX ORDER — FLUOXETINE HYDROCHLORIDE 20 MG/1
40 CAPSULE ORAL DAILY
Status: DISCONTINUED | OUTPATIENT
Start: 2021-10-13 | End: 2021-10-27 | Stop reason: HOSPADM

## 2021-10-13 RX ADMIN — FLUOXETINE 40 MG: 20 CAPSULE ORAL at 11:59

## 2021-10-13 RX ADMIN — Medication 10 ML: at 21:55

## 2021-10-13 RX ADMIN — ACETAMINOPHEN 650 MG: 325 TABLET ORAL at 15:25

## 2021-10-13 RX ADMIN — HYDROMORPHONE HYDROCHLORIDE 0.4 MG: 1 INJECTION, SOLUTION INTRAMUSCULAR; INTRAVENOUS; SUBCUTANEOUS at 05:28

## 2021-10-13 RX ADMIN — PIPERACILLIN AND TAZOBACTAM 3.38 G: 3; .375 INJECTION, POWDER, LYOPHILIZED, FOR SOLUTION INTRAVENOUS at 17:31

## 2021-10-13 RX ADMIN — PIPERACILLIN AND TAZOBACTAM 3.38 G: 3; .375 INJECTION, POWDER, LYOPHILIZED, FOR SOLUTION INTRAVENOUS at 01:25

## 2021-10-13 RX ADMIN — SODIUM CHLORIDE 40 MG: 9 INJECTION INTRAMUSCULAR; INTRAVENOUS; SUBCUTANEOUS at 09:35

## 2021-10-13 RX ADMIN — PIPERACILLIN AND TAZOBACTAM 3.38 G: 3; .375 INJECTION, POWDER, LYOPHILIZED, FOR SOLUTION INTRAVENOUS at 09:35

## 2021-10-13 RX ADMIN — Medication 10 ML: at 14:00

## 2021-10-13 RX ADMIN — ONDANSETRON HYDROCHLORIDE 4 MG: 2 INJECTION, SOLUTION INTRAMUSCULAR; INTRAVENOUS at 22:58

## 2021-10-13 RX ADMIN — SODIUM CHLORIDE 75 ML/HR: 900 INJECTION, SOLUTION INTRAVENOUS at 22:59

## 2021-10-13 RX ADMIN — ENOXAPARIN SODIUM 40 MG: 100 INJECTION SUBCUTANEOUS at 09:35

## 2021-10-13 NOTE — PROGRESS NOTES
Pt states, \"Im in here dying and cant get no help\". I asked the patient to expain to me what he was feeling and why he felt like he was \"dying\". Patient stated he wasn't going to talk to me and repeat himself and that he will just talk to the doctor when they get here. Pt is very unhappy and argumentiv.

## 2021-10-13 NOTE — PROGRESS NOTES
Bedside shift change report given to Marlene Aponte RN (oncoming nurse) by Venice Giron RN (offgoing nurse). Report included the following information SBAR, Kardex, Intake/Output, MAR and Recent Results.

## 2021-10-13 NOTE — PROGRESS NOTES
[facility] Adult  Hospitalist Group                                                                                          Hospitalist Progress Note  [address],   Answering service: [phone] from in house phone        Date of Service:  10/13/2021  NAME:  Gale Weber :  1955  MRN:  [id number]      Admission Summary:   77year old with past medical history chronic pancreatitis, s/p choledochojejunostomy for biliary stricture. Here with sepsis and found to have multiloculated abdominal fluid collection.          Interval history / Subjective: Follow up sepsis/fluid collection. Patient seen and examined. Febrile yesterday evening 101.9 and has not been feeling well since. Reports loose stools and weakness overnight. Pain controlled. Assessment & Plan:     Sepsis (fever, leukocytosis, tachycardia) with abdominal abscess in setting of chronic pancreatitis:  -CT abd/pelvis with multiloculated complex and septated collection adjacent to the tail of the pancreas and the gastric fundus  -general surgery consulted. Appreciate recs.  Suspected infected pancreatic pseudocyst. Tentative surgery date 10/14  -continue zosyn  -follow blood cultures  -pain control, antiemetics  -ice chips    Hyponatremia:   -suspect due to volume depletion, continue IVFs  Hypokalemia: replete   Hypomagnesemia: repleted   Anemia, normocytic: stable  -monitor closely, Hgb 7.5, transfuse for Hgb <7  Depression: home fluoxetine     Severe biliary strictures, s/p choledochojejunostomy on 2016    Code status: full   DVT prophylaxis: lovenox    Care Plan discussed with: Patient/Family and Nurse  Anticipated Disposition: Home w/Family  Anticipated Discharge: Greater than 48 hours     Hospital Problems  Date Reviewed: 2020        Codes Class Noted POA    Sepsis ([facility] [address] ICD-10-CM: A41.9  ICD-9-CM: 038.9, 995.91  10/10/2021 Unknown                Review of Systems:     Negative unless stated above     Vital Signs:    Last 24hrs VS reviewed since prior progress note. Most recent are:  Visit Vitals  BP (!) 157/81   Pulse 85   Temp 98.9 °F (37.2 °C)   Resp 16   Ht 6' (1.829 m)   Wt 73 kg (161 lb)   SpO2 96%   BMI 21.84 kg/m²       No intake or output data in the 24 hours ending 10/13/21 1032     Physical Examination:     I had a face to face encounter with this patient and independently examined them on 10/13/2021 as outlined below:          Constitutional:  No acute distress, cooperative, pleasant, appears down    ENT:  Oral mucosa moist, oropharynx benign. Resp:  CTA bilaterally. No wheezing/rhonchi/rales. No accessory muscle use   CV:  Regular rhythm, normal rate, no murmurs, gallops, rubs    GI:  Soft, non distended, non tender to palpation. Well healed central vertical scar, No guarding hyperactive bowel sounds, no hepatosplenomegaly     Musculoskeletal:  No edema, warm, 2+ pulses throughout    Neurologic:  Moves all extremities. Data Review:    Review and/or order of clinical lab test  Review and/or order of tests in the radiology section of CPT  Review and/or order of tests in the medicine section of CPT      Labs:     Recent Labs     10/12/21  0215 10/11/21  0634   WBC 15.3* 13.1*   HGB 7.5* 7.5*   HCT 22.5* 22.1*   * 396     Recent Labs     10/13/21  0121 10/12/21  0215 10/11/21  0634   * 132* 133*   K 4.4 4.0 3.1*    99 98   CO2 24 26 28   BUN 12 13 12   CREA 0.66* 0.77 0.85   * 84 106*   CA 8.1* 8.7 8.4*   MG 2.0 1.4*  --    PHOS 2.7 2.4*  --      Recent Labs     10/11/21  0634 10/10/21  1826   ALT 15 15   AP 79 88   TBILI 0.7 0.7   TP 6.6 7.6   ALB 2.1* 2.5*   GLOB 4.5* 5.1*   LPSE  --  24*     No results for input(s): INR, PTP, APTT, INREXT, INREXT in the last 72 hours. No results for input(s): FE, TIBC, PSAT, FERR in the last 72 hours. No results found for: FOL, RBCF   No results for input(s): PH, PCO2, PO2 in the last 72 hours.   No results for input(s): CPK, CKNDX, TROIQ in the last 72 hours.     No lab exists for component: CPKMB  No results found for: CHOL, CHOLX, CHLST, CHOLV, HDL, HDLP, LDL, LDLC, DLDLP, TGLX, TRIGL, TRIGP, CHHD, CHHDX  Lab Results   Component Value Date/Time    Glucose (POC) 102 (H) 02/20/2016 06:17 AM    Glucose (POC) 136 (H) 02/19/2016 10:57 PM    Glucose (POC) 116 (H) 02/19/2016 04:44 PM    Glucose (POC) 132 (H) 02/19/2016 11:43 AM    Glucose (POC) 113 (H) 02/19/2016 06:34 AM     Lab Results   Component Value Date/Time    Color DARK YELLOW 02/08/2016 08:22 PM    Appearance CLOUDY (A) 02/08/2016 08:22 PM    Specific gravity 1.026 02/08/2016 08:22 PM    pH (UA) 5.5 02/08/2016 08:22 PM    Protein 100 (A) 02/08/2016 08:22 PM    Glucose NEGATIVE  02/08/2016 08:22 PM    Ketone 40 (A) 02/08/2016 08:22 PM    Bilirubin LARGE (A) 08/16/2014 04:35 AM    Urobilinogen 1.0 02/08/2016 08:22 PM    Nitrites NEGATIVE  02/08/2016 08:22 PM    Leukocyte Esterase SMALL (A) 02/08/2016 08:22 PM    Epithelial cells FEW 02/08/2016 08:22 PM    Bacteria NEGATIVE  02/08/2016 08:22 PM    WBC 10-20 02/08/2016 08:22 PM    RBC 0-5 02/08/2016 08:22 PM         Medications Reviewed:     Current Facility-Administered Medications   Medication Dose Route Frequency    FLUoxetine (PROzac) capsule 40 mg  40 mg Oral DAILY    pantoprazole (PROTONIX) 40 mg in 0.9% sodium chloride 10 mL injection  40 mg IntraVENous DAILY    piperacillin-tazobactam (ZOSYN) 3.375 g in 0.9% sodium chloride (MBP/ADV) 100 mL MBP  3.375 g IntraVENous Q8H    0.9% sodium chloride infusion  75 mL/hr IntraVENous CONTINUOUS    melatonin tablet 3 mg  3 mg Oral QHS    sodium chloride (NS) flush 5-40 mL  5-40 mL IntraVENous Q8H    sodium chloride (NS) flush 5-40 mL  5-40 mL IntraVENous PRN    acetaminophen (TYLENOL) tablet 650 mg  650 mg Oral Q6H PRN    Or    acetaminophen (TYLENOL) suppository 650 mg  650 mg Rectal Q6H PRN    polyethylene glycol (MIRALAX) packet 17 g  17 g Oral DAILY PRN    ondansetron (ZOFRAN ODT) tablet 4 mg  4 mg Oral Q8H PRN    Or    ondansetron (ZOFRAN) injection 4 mg  4 mg IntraVENous Q6H PRN    enoxaparin (LOVENOX) injection 40 mg  40 mg SubCUTAneous DAILY    HYDROmorphone (DILAUDID) injection 0.4 mg  0.4 mg IntraVENous Q4H PRN     ______________________________________________________________________  EXPECTED LENGTH OF STAY: 3d 12h  ACTUAL LENGTH OF STAY:          1401 DO katherine Valle

## 2021-10-13 NOTE — PROGRESS NOTES
Comprehensive Nutrition Assessment    Type and Reason for Visit: Initial    Nutrition Recommendations/Plan:    1. Diet advancement per surgeon after surgery   - add Ensure Clear TID once on clear liquids    2. If unable to advance diet or tolerate at least full liquids in 48-72hrs after surgery would start PN - see below for goal if needed    3. At high risk for refeeding syndrome. Add 100mg thiamine with electrolyte abnormalities already this admit. 4. Follow for results of fat-soluble vitamin labs    Nutrition Assessment:    Pt admitted for sepsis. PMHx: bile duct stricture s/p choledochojejunostomy (2016), chronic pancreatitis. Bacteremia, depression, GERD, pancreatic cyst. Chronic issues with pancreatitis with handful of episodes each year. Abd pain at home with poor PO intake for past 2 weeks. CT showing pancreatic fluid collection. Continued fevers despite abx, seen by surgery. Plans for surgery tomorrow with possible open drainage of pancreatic abscess. Pt visited today. Temporal and upper extremity wasting observed. Pt reports tolerating some PO but reduced intake over past 2 weeks. Was able to eat about 2 eggs and 1 white roll with 3-4 Ensure Clear (720-960kcal, 24-32g protein per day). Does not tolerate milkshake Ensure well. Pt at low threshold for needing TPN, depending on diet progression after surgery. If needed recommend goal: 5%AA, D15 @ 83ml/hr + 250ml, 20% lipids daily. Provides: 1920kcal, 100g protein, 300g CHO, 2000ml fluid. Meets 100% protein, 97% energy needs. On pancreatic enzymes at home (Creon 31058 capsules: 2-3 capsules/meal depending on mealsize). Will check fat-soluble vitamin levels. Pt reports UBW 160lbs, at home weight down to 150# (68.2kg) - indicates wt loss of 7%.      Malnutrition Assessment:  Malnutrition Status:  Severe malnutrition    Context:  Acute illness     Findings of the 6 clinical characteristics of malnutrition:   Energy Intake:  1 - 75% or less of est energy req for 7 or more days  Weight Loss:  1.00 - 7.5% over 3 months     Body Fat Loss:  7 - Moderate body fat loss, Buccal region   Muscle Mass Loss:  7 - Moderate muscle mass loss, Temples (temporalis)  Fluid Accumulation:  Unable to assess,     Strength:  Not performed     Nutritionally Significant Medications: prozac, protonix, zosyn, NS @ 75ml/hr; PRN: zofran, miralax    Estimated Daily Nutrient Needs:  Energy (kcal): 1977-2157kcal (MSJ x 1.2 x 1.1-1.2); Weight Used for Energy Requirements: Other (specify) (68kg)  Protein (g): 95-109g (1.4-1.6g/kg);  Weight Used for Protein Requirements: Other (specify) (68kg)  Fluid (ml/day): 2100; Method Used for Fluid Requirements: 1 ml/kcal    Nutrition Related Findings:       BM: 10/9  Edema: none  Wounds:  None       Current Nutrition Therapies:   Diet: NPO    Anthropometric Measures:  · Height:  6' (182.9 cm)  · Current Body Wt:  68 kg (150 lb)   · Admission Body Wt:  161 lb    · Usual Body Wt:        · Ideal Body Wt:  178:  84.3 %   Wt Readings from Last 10 Encounters:   10/11/21 73 kg (161 lb)   10/10/18 83 kg (183 lb)   08/31/18 83 kg (183 lb)   08/22/18 83 kg (183 lb)   07/12/17 82.6 kg (182 lb 3 oz)   02/08/17 82.1 kg (181 lb)   01/13/17 82.1 kg (181 lb)   03/16/16 83 kg (183 lb)   03/02/16 75.8 kg (167 lb)   02/18/16 81.2 kg (179 lb 0.2 oz)     Nutrition Diagnosis:   · Inadequate protein-energy intake, Severe malnutrition related to altered GI function as evidenced by NPO or clear liquid status due to medical condition, poor intake prior to admission    · Impaired nutrient utilization related to altered GI function as evidenced by  (PERT with chronic pancreatitis)    Nutrition Interventions:   Food and/or Nutrient Delivery: Continue NPO  Nutrition Education and Counseling: No recommendations at this time  Coordination of Nutrition Care: Interdisciplinary rounds, Continue to monitor while inpatient    Goals:  Diet advancement or start of PN in 48-72hrs Nutrition Monitoring and Evaluation:   Behavioral-Environmental Outcomes: None identified  Food/Nutrient Intake Outcomes: Diet advancement/tolerance, Vitamin/mineral intake  Physical Signs/Symptoms Outcomes: Biochemical data, Weight, GI status    Discharge Planning:     Too soon to determine     Liborio Acuna RD  [facility], Contact via [employer]

## 2021-10-13 NOTE — PROGRESS NOTES
Pt stated, \"I just want God to take me. I am done, I am so done\". I asked the patient if he normally takes psychiatric medications as he is a [place] and seemed to be depressed. Pt stated he usually takes 2 different kinds of medications for that and he has been off them for maybe 2 or 3 days. I informed Dr. Jessee Mcgowan. Pt stated he called and told his doctor this morning and they were going to be contacting Dr. Anju Nguyen as well in regards to his depressive feelings this morning and getting him back on his medications.

## 2021-10-13 NOTE — PROGRESS NOTES
General Surgery Daily Progress Note    Admit Date: 10/10/2021  Post-Operative Day: * No surgery date entered * from Procedure(s):  LAPAROSCOPIC POSSIBLE OPEN DRAINAGE OF PANCREATIC ABCESS     Subjective:     Last 24 hrs: pt had a rough night; diarrhea, fevers, chills, couldn't get any tylenol - very tearful and frustrated       Objective:     Blood pressure (!) 157/81, pulse 85, temperature 98.9 °F (37.2 °C), resp. rate 16, height 6' (1.829 m), weight 161 lb (73 kg), SpO2 96 %. Temp (24hrs), Av.2 °F (37.3 °C), Min:97.9 °F (36.6 °C), Max:101.9 °F (38.8 °C)      _____________________  Physical Exam:     Alert and Oriented, x3, in no acute distress. Cardiovascular: RRR, no peripheral edema  Abdomen: Flat, NT at present      Assessment:   Active Problems:    Sepsis ([address] (10/10/2021)            Plan:     Cont abx  Monitor for fevers  Hopefully surgery tomorrow per Dr Anju Nguyen  PPI  Hold lovenox  Am labs    Data Review:    Recent Labs     10/12/21  0215 10/11/21  0634 10/10/21  1826   WBC 15.3* 13.1* 14.7*   HGB 7.5* 7.5* 8.6*   HCT 22.5* 22.1* 25.5*   * 396 487*     Recent Labs     10/13/21  0121 10/12/21  0215 10/11/21  0634 10/10/21  1826 10/10/21  1826   * 132* 133*   < > 130*   K 4.4 4.0 3.1*   < > 2.6*    99 98   < > 94*   CO2 24 26 28   < > 27   * 84 106*   < > 168*   BUN 12 13 12   < > 14   CREA 0.66* 0.77 0.85   < > 1.13   CA 8.1* 8.7 8.4*   < > 8.9   MG 2.0 1.4*  --   --   --    PHOS 2.7 2.4*  --   --   --    ALB  --   --  2.1*  --  2.5*   ALT  --   --  15  --  15    < > = values in this interval not displayed.      Recent Labs     10/10/21  1826   LPSE 24*           ______________________  Medications:    Current Facility-Administered Medications   Medication Dose Route Frequency    FLUoxetine (PROzac) capsule 40 mg  40 mg Oral DAILY    pantoprazole (PROTONIX) 40 mg in 0.9% sodium chloride 10 mL injection  40 mg IntraVENous DAILY    piperacillin-tazobactam (ZOSYN) 3.375 g in 0.9% sodium chloride (MBP/ADV) 100 mL MBP  3.375 g IntraVENous Q8H    0.9% sodium chloride infusion  75 mL/hr IntraVENous CONTINUOUS    melatonin tablet 3 mg  3 mg Oral QHS    sodium chloride (NS) flush 5-40 mL  5-40 mL IntraVENous Q8H    sodium chloride (NS) flush 5-40 mL  5-40 mL IntraVENous PRN    acetaminophen (TYLENOL) tablet 650 mg  650 mg Oral Q6H PRN    Or    acetaminophen (TYLENOL) suppository 650 mg  650 mg Rectal Q6H PRN    polyethylene glycol (MIRALAX) packet 17 g  17 g Oral DAILY PRN    ondansetron (ZOFRAN ODT) tablet 4 mg  4 mg Oral Q8H PRN    Or    ondansetron (ZOFRAN) injection 4 mg  4 mg IntraVENous Q6H PRN    enoxaparin (LOVENOX) injection 40 mg  40 mg SubCUTAneous DAILY    HYDROmorphone (DILAUDID) injection 0.4 mg  0.4 mg IntraVENous Q4H PRN       Carlton Nettles NP  10/13/2021

## 2021-10-14 ENCOUNTER — ANESTHESIA (OUTPATIENT)
Dept: SURGERY | Age: 66
DRG: 853 | End: 2021-10-14
Payer: MEDICARE

## 2021-10-14 LAB
25(OH)D3 SERPL-MCNC: 36 NG/ML (ref 30–100)
ABO + RH BLD: NORMAL
ANION GAP SERPL CALC-SCNC: 9 MMOL/L (ref 5–15)
BASOPHILS # BLD: 0 K/UL (ref 0–0.1)
BASOPHILS NFR BLD: 0 % (ref 0–1)
BLOOD GROUP ANTIBODIES SERPL: NORMAL
BUN SERPL-MCNC: 9 MG/DL (ref 6–20)
BUN/CREAT SERPL: 16 (ref 12–20)
CALCIUM SERPL-MCNC: 8.4 MG/DL (ref 8.5–10.1)
CHLORIDE SERPL-SCNC: 100 MMOL/L (ref 97–108)
CO2 SERPL-SCNC: 25 MMOL/L (ref 21–32)
COVID-19 RAPID TEST, COVR: NOT DETECTED
CREAT SERPL-MCNC: 0.56 MG/DL (ref 0.7–1.3)
DIFFERENTIAL METHOD BLD: ABNORMAL
EOSINOPHIL # BLD: 0 K/UL (ref 0–0.4)
EOSINOPHIL NFR BLD: 0 % (ref 0–7)
ERYTHROCYTE [DISTWIDTH] IN BLOOD BY AUTOMATED COUNT: 14.1 % (ref 11.5–14.5)
ERYTHROCYTE [DISTWIDTH] IN BLOOD BY AUTOMATED COUNT: 14.1 % (ref 11.5–14.5)
ERYTHROCYTE [DISTWIDTH] IN BLOOD BY AUTOMATED COUNT: 14.6 % (ref 11.5–14.5)
GLUCOSE BLD STRIP.AUTO-MCNC: 77 MG/DL (ref 65–117)
GLUCOSE SERPL-MCNC: 97 MG/DL (ref 65–100)
HCT VFR BLD AUTO: 21.9 % (ref 36.6–50.3)
HCT VFR BLD AUTO: 21.9 % (ref 36.6–50.3)
HCT VFR BLD AUTO: 22.1 % (ref 36.6–50.3)
HGB BLD-MCNC: 12.5 G/DL (ref 12.1–17)
HGB BLD-MCNC: 7.3 G/DL (ref 12.1–17)
HGB BLD-MCNC: 7.3 G/DL (ref 12.1–17)
HGB BLD-MCNC: 7.4 G/DL (ref 12.1–17)
HISTORY CHECKED?,CKHIST: NORMAL
IMM GRANULOCYTES # BLD AUTO: 0.2 K/UL (ref 0–0.04)
IMM GRANULOCYTES NFR BLD AUTO: 1 % (ref 0–0.5)
LYMPHOCYTES # BLD: 0.9 K/UL (ref 0.8–3.5)
LYMPHOCYTES NFR BLD: 6 % (ref 12–49)
MAGNESIUM SERPL-MCNC: 1.7 MG/DL (ref 1.6–2.4)
MCH RBC QN AUTO: 28.3 PG (ref 26–34)
MCH RBC QN AUTO: 28.4 PG (ref 26–34)
MCH RBC QN AUTO: 28.8 PG (ref 26–34)
MCHC RBC AUTO-ENTMCNC: 33 G/DL (ref 30–36.5)
MCHC RBC AUTO-ENTMCNC: 33.3 G/DL (ref 30–36.5)
MCHC RBC AUTO-ENTMCNC: 33.8 G/DL (ref 30–36.5)
MCV RBC AUTO: 84.9 FL (ref 80–99)
MCV RBC AUTO: 85.2 FL (ref 80–99)
MCV RBC AUTO: 86 FL (ref 80–99)
MONOCYTES # BLD: 0.9 K/UL (ref 0–1)
MONOCYTES NFR BLD: 6 % (ref 5–13)
NEUTS SEG # BLD: 13 K/UL (ref 1.8–8)
NEUTS SEG NFR BLD: 87 % (ref 32–75)
NRBC # BLD: 0 K/UL (ref 0–0.01)
NRBC BLD-RTO: 0 PER 100 WBC
PHOSPHATE SERPL-MCNC: 2 MG/DL (ref 2.6–4.7)
PLATELET # BLD AUTO: 489 K/UL (ref 150–400)
PLATELET # BLD AUTO: 507 K/UL (ref 150–400)
PLATELET # BLD AUTO: 539 K/UL (ref 150–400)
PMV BLD AUTO: 8.5 FL (ref 8.9–12.9)
PMV BLD AUTO: 8.6 FL (ref 8.9–12.9)
PMV BLD AUTO: 8.7 FL (ref 8.9–12.9)
POTASSIUM SERPL-SCNC: 3.1 MMOL/L (ref 3.5–5.1)
RBC # BLD AUTO: 2.57 M/UL (ref 4.1–5.7)
RBC # BLD AUTO: 2.57 M/UL (ref 4.1–5.7)
RBC # BLD AUTO: 2.58 M/UL (ref 4.1–5.7)
SERVICE CMNT-IMP: NORMAL
SODIUM SERPL-SCNC: 134 MMOL/L (ref 136–145)
SOURCE, COVRS: NORMAL
SPECIMEN EXP DATE BLD: NORMAL
WBC # BLD AUTO: 14.9 K/UL (ref 4.1–11.1)
WBC # BLD AUTO: 15.2 K/UL (ref 4.1–11.1)
WBC # BLD AUTO: 22.4 K/UL (ref 4.1–11.1)

## 2021-10-14 PROCEDURE — 82962 GLUCOSE BLOOD TEST: CPT

## 2021-10-14 PROCEDURE — 77030042556 HC PNCL CAUT -B: Performed by: SURGERY

## 2021-10-14 PROCEDURE — 74011250637 HC RX REV CODE- 250/637: Performed by: INTERNAL MEDICINE

## 2021-10-14 PROCEDURE — 65660000000 HC RM CCU STEPDOWN

## 2021-10-14 PROCEDURE — 77030008684 HC TU ET CUF COVD -B: Performed by: ANESTHESIOLOGY

## 2021-10-14 PROCEDURE — 87635 SARS-COV-2 COVID-19 AMP PRB: CPT

## 2021-10-14 PROCEDURE — 74011000258 HC RX REV CODE- 258: Performed by: INTERNAL MEDICINE

## 2021-10-14 PROCEDURE — 87185 SC STD ENZYME DETCJ PER NZM: CPT

## 2021-10-14 PROCEDURE — 87070 CULTURE OTHR SPECIMN AEROBIC: CPT

## 2021-10-14 PROCEDURE — 77030013079 HC BLNKT BAIR HGGR 3M -A: Performed by: ANESTHESIOLOGY

## 2021-10-14 PROCEDURE — 74011000250 HC RX REV CODE- 250: Performed by: NURSE ANESTHETIST, CERTIFIED REGISTERED

## 2021-10-14 PROCEDURE — 85025 COMPLETE CBC W/AUTO DIFF WBC: CPT

## 2021-10-14 PROCEDURE — 74011000258 HC RX REV CODE- 258: Performed by: SURGERY

## 2021-10-14 PROCEDURE — C9113 INJ PANTOPRAZOLE SODIUM, VIA: HCPCS | Performed by: NURSE PRACTITIONER

## 2021-10-14 PROCEDURE — P9045 ALBUMIN (HUMAN), 5%, 250 ML: HCPCS | Performed by: NURSE ANESTHETIST, CERTIFIED REGISTERED

## 2021-10-14 PROCEDURE — 87186 SC STD MICRODIL/AGAR DIL: CPT

## 2021-10-14 PROCEDURE — 74011250636 HC RX REV CODE- 250/636: Performed by: NURSE ANESTHETIST, CERTIFIED REGISTERED

## 2021-10-14 PROCEDURE — 85018 HEMOGLOBIN: CPT

## 2021-10-14 PROCEDURE — 84100 ASSAY OF PHOSPHORUS: CPT

## 2021-10-14 PROCEDURE — 0F9G4ZZ DRAINAGE OF PANCREAS, PERCUTANEOUS ENDOSCOPIC APPROACH: ICD-10-PCS | Performed by: SURGERY

## 2021-10-14 PROCEDURE — 74011250636 HC RX REV CODE- 250/636: Performed by: STUDENT IN AN ORGANIZED HEALTH CARE EDUCATION/TRAINING PROGRAM

## 2021-10-14 PROCEDURE — 74011250636 HC RX REV CODE- 250/636: Performed by: INTERNAL MEDICINE

## 2021-10-14 PROCEDURE — 74011250636 HC RX REV CODE- 250/636: Performed by: SURGERY

## 2021-10-14 PROCEDURE — 85027 COMPLETE CBC AUTOMATED: CPT

## 2021-10-14 PROCEDURE — 83735 ASSAY OF MAGNESIUM: CPT

## 2021-10-14 PROCEDURE — 82306 VITAMIN D 25 HYDROXY: CPT

## 2021-10-14 PROCEDURE — 2709999900 HC NON-CHARGEABLE SUPPLY: Performed by: SURGERY

## 2021-10-14 PROCEDURE — 77030031139 HC SUT VCRL2 J&J -A: Performed by: SURGERY

## 2021-10-14 PROCEDURE — 74011000250 HC RX REV CODE- 250: Performed by: SURGERY

## 2021-10-14 PROCEDURE — 80048 BASIC METABOLIC PNL TOTAL CA: CPT

## 2021-10-14 PROCEDURE — 74011250636 HC RX REV CODE- 250/636: Performed by: NURSE PRACTITIONER

## 2021-10-14 PROCEDURE — 77030026438 HC STYL ET INTUB CARD -A: Performed by: ANESTHESIOLOGY

## 2021-10-14 PROCEDURE — 84446 ASSAY OF VITAMIN E: CPT

## 2021-10-14 PROCEDURE — 74011000250 HC RX REV CODE- 250: Performed by: NURSE PRACTITIONER

## 2021-10-14 PROCEDURE — 76010000131 HC OR TIME 2 TO 2.5 HR: Performed by: SURGERY

## 2021-10-14 PROCEDURE — 74011000258 HC RX REV CODE- 258: Performed by: NURSE ANESTHETIST, CERTIFIED REGISTERED

## 2021-10-14 PROCEDURE — 36415 COLL VENOUS BLD VENIPUNCTURE: CPT

## 2021-10-14 PROCEDURE — 77030002933 HC SUT MCRYL J&J -A: Performed by: SURGERY

## 2021-10-14 PROCEDURE — 76210000016 HC OR PH I REC 1 TO 1.5 HR: Performed by: SURGERY

## 2021-10-14 PROCEDURE — 84590 ASSAY OF VITAMIN A: CPT

## 2021-10-14 PROCEDURE — 87077 CULTURE AEROBIC IDENTIFY: CPT

## 2021-10-14 PROCEDURE — 49322 LAPAROSCOPY ASPIRATION: CPT | Performed by: SURGERY

## 2021-10-14 PROCEDURE — 87075 CULTR BACTERIA EXCEPT BLOOD: CPT

## 2021-10-14 PROCEDURE — 76060000035 HC ANESTHESIA 2 TO 2.5 HR: Performed by: SURGERY

## 2021-10-14 PROCEDURE — 86901 BLOOD TYPING SEROLOGIC RH(D): CPT

## 2021-10-14 RX ORDER — DIPHENHYDRAMINE HYDROCHLORIDE 50 MG/ML
12.5 INJECTION, SOLUTION INTRAMUSCULAR; INTRAVENOUS AS NEEDED
Status: DISCONTINUED | OUTPATIENT
Start: 2021-10-14 | End: 2021-10-14 | Stop reason: HOSPADM

## 2021-10-14 RX ORDER — ROCURONIUM BROMIDE 10 MG/ML
INJECTION, SOLUTION INTRAVENOUS AS NEEDED
Status: DISCONTINUED | OUTPATIENT
Start: 2021-10-14 | End: 2021-10-14 | Stop reason: HOSPADM

## 2021-10-14 RX ORDER — SODIUM CHLORIDE, SODIUM LACTATE, POTASSIUM CHLORIDE, CALCIUM CHLORIDE 600; 310; 30; 20 MG/100ML; MG/100ML; MG/100ML; MG/100ML
1000 INJECTION, SOLUTION INTRAVENOUS CONTINUOUS
Status: DISCONTINUED | OUTPATIENT
Start: 2021-10-14 | End: 2021-10-14 | Stop reason: HOSPADM

## 2021-10-14 RX ORDER — HYDROMORPHONE HYDROCHLORIDE 2 MG/ML
INJECTION, SOLUTION INTRAMUSCULAR; INTRAVENOUS; SUBCUTANEOUS AS NEEDED
Status: DISCONTINUED | OUTPATIENT
Start: 2021-10-14 | End: 2021-10-14 | Stop reason: HOSPADM

## 2021-10-14 RX ORDER — GLYCOPYRROLATE 0.2 MG/ML
INJECTION INTRAMUSCULAR; INTRAVENOUS AS NEEDED
Status: DISCONTINUED | OUTPATIENT
Start: 2021-10-14 | End: 2021-10-14 | Stop reason: HOSPADM

## 2021-10-14 RX ORDER — SODIUM CHLORIDE 0.9 % (FLUSH) 0.9 %
5-40 SYRINGE (ML) INJECTION EVERY 8 HOURS
Status: DISCONTINUED | OUTPATIENT
Start: 2021-10-14 | End: 2021-10-14 | Stop reason: HOSPADM

## 2021-10-14 RX ORDER — PROPOFOL 10 MG/ML
INJECTION, EMULSION INTRAVENOUS AS NEEDED
Status: DISCONTINUED | OUTPATIENT
Start: 2021-10-14 | End: 2021-10-14 | Stop reason: HOSPADM

## 2021-10-14 RX ORDER — SODIUM CHLORIDE 0.9 % (FLUSH) 0.9 %
5-40 SYRINGE (ML) INJECTION AS NEEDED
Status: DISCONTINUED | OUTPATIENT
Start: 2021-10-14 | End: 2021-10-14 | Stop reason: HOSPADM

## 2021-10-14 RX ORDER — MIDAZOLAM HYDROCHLORIDE 1 MG/ML
1 INJECTION, SOLUTION INTRAMUSCULAR; INTRAVENOUS AS NEEDED
Status: DISCONTINUED | OUTPATIENT
Start: 2021-10-14 | End: 2021-10-14 | Stop reason: HOSPADM

## 2021-10-14 RX ORDER — DEXTROSE, SODIUM CHLORIDE, AND POTASSIUM CHLORIDE 5; .45; .15 G/100ML; G/100ML; G/100ML
125 INJECTION INTRAVENOUS CONTINUOUS
Status: DISCONTINUED | OUTPATIENT
Start: 2021-10-14 | End: 2021-10-20

## 2021-10-14 RX ORDER — MIDAZOLAM HYDROCHLORIDE 1 MG/ML
0.5 INJECTION, SOLUTION INTRAMUSCULAR; INTRAVENOUS
Status: DISCONTINUED | OUTPATIENT
Start: 2021-10-14 | End: 2021-10-14 | Stop reason: HOSPADM

## 2021-10-14 RX ORDER — EPHEDRINE SULFATE/0.9% NACL/PF 50 MG/5 ML
5 SYRINGE (ML) INTRAVENOUS AS NEEDED
Status: DISCONTINUED | OUTPATIENT
Start: 2021-10-14 | End: 2021-10-14 | Stop reason: HOSPADM

## 2021-10-14 RX ORDER — FENTANYL CITRATE 50 UG/ML
INJECTION, SOLUTION INTRAMUSCULAR; INTRAVENOUS AS NEEDED
Status: DISCONTINUED | OUTPATIENT
Start: 2021-10-14 | End: 2021-10-14 | Stop reason: HOSPADM

## 2021-10-14 RX ORDER — SODIUM CHLORIDE, SODIUM LACTATE, POTASSIUM CHLORIDE, CALCIUM CHLORIDE 600; 310; 30; 20 MG/100ML; MG/100ML; MG/100ML; MG/100ML
125 INJECTION, SOLUTION INTRAVENOUS CONTINUOUS
Status: DISCONTINUED | OUTPATIENT
Start: 2021-10-14 | End: 2021-10-14 | Stop reason: HOSPADM

## 2021-10-14 RX ORDER — SUCCINYLCHOLINE CHLORIDE 20 MG/ML
INJECTION INTRAMUSCULAR; INTRAVENOUS AS NEEDED
Status: DISCONTINUED | OUTPATIENT
Start: 2021-10-14 | End: 2021-10-14 | Stop reason: HOSPADM

## 2021-10-14 RX ORDER — ONDANSETRON 2 MG/ML
INJECTION INTRAMUSCULAR; INTRAVENOUS AS NEEDED
Status: DISCONTINUED | OUTPATIENT
Start: 2021-10-14 | End: 2021-10-14 | Stop reason: HOSPADM

## 2021-10-14 RX ORDER — SODIUM CHLORIDE 9 MG/ML
250 INJECTION, SOLUTION INTRAVENOUS AS NEEDED
Status: DISCONTINUED | OUTPATIENT
Start: 2021-10-14 | End: 2021-10-27 | Stop reason: HOSPADM

## 2021-10-14 RX ORDER — FENTANYL CITRATE 50 UG/ML
50 INJECTION, SOLUTION INTRAMUSCULAR; INTRAVENOUS AS NEEDED
Status: DISCONTINUED | OUTPATIENT
Start: 2021-10-14 | End: 2021-10-14 | Stop reason: HOSPADM

## 2021-10-14 RX ORDER — POTASSIUM CHLORIDE 14.9 MG/ML
10 INJECTION INTRAVENOUS
Status: COMPLETED | OUTPATIENT
Start: 2021-10-14 | End: 2021-10-14

## 2021-10-14 RX ORDER — SODIUM CHLORIDE 9 MG/ML
1000 INJECTION, SOLUTION INTRAVENOUS CONTINUOUS
Status: DISCONTINUED | OUTPATIENT
Start: 2021-10-14 | End: 2021-10-14 | Stop reason: HOSPADM

## 2021-10-14 RX ORDER — DEXMEDETOMIDINE HYDROCHLORIDE 100 UG/ML
INJECTION, SOLUTION INTRAVENOUS AS NEEDED
Status: DISCONTINUED | OUTPATIENT
Start: 2021-10-14 | End: 2021-10-14 | Stop reason: HOSPADM

## 2021-10-14 RX ORDER — ACETAMINOPHEN 500 MG
1000 TABLET ORAL ONCE
Status: DISCONTINUED | OUTPATIENT
Start: 2021-10-14 | End: 2021-10-14 | Stop reason: HOSPADM

## 2021-10-14 RX ORDER — NEOSTIGMINE METHYLSULFATE 1 MG/ML
INJECTION, SOLUTION INTRAVENOUS AS NEEDED
Status: DISCONTINUED | OUTPATIENT
Start: 2021-10-14 | End: 2021-10-14 | Stop reason: HOSPADM

## 2021-10-14 RX ORDER — SODIUM CHLORIDE 9 MG/ML
125 INJECTION, SOLUTION INTRAVENOUS CONTINUOUS
Status: DISCONTINUED | OUTPATIENT
Start: 2021-10-14 | End: 2021-10-14 | Stop reason: HOSPADM

## 2021-10-14 RX ORDER — FENTANYL CITRATE 50 UG/ML
25 INJECTION, SOLUTION INTRAMUSCULAR; INTRAVENOUS
Status: DISCONTINUED | OUTPATIENT
Start: 2021-10-14 | End: 2021-10-14 | Stop reason: HOSPADM

## 2021-10-14 RX ORDER — ALBUMIN HUMAN 50 G/1000ML
SOLUTION INTRAVENOUS AS NEEDED
Status: DISCONTINUED | OUTPATIENT
Start: 2021-10-14 | End: 2021-10-14 | Stop reason: HOSPADM

## 2021-10-14 RX ORDER — MORPHINE SULFATE 2 MG/ML
2 INJECTION, SOLUTION INTRAMUSCULAR; INTRAVENOUS
Status: DISCONTINUED | OUTPATIENT
Start: 2021-10-14 | End: 2021-10-14 | Stop reason: HOSPADM

## 2021-10-14 RX ORDER — MIDAZOLAM HYDROCHLORIDE 1 MG/ML
INJECTION, SOLUTION INTRAMUSCULAR; INTRAVENOUS AS NEEDED
Status: DISCONTINUED | OUTPATIENT
Start: 2021-10-14 | End: 2021-10-14 | Stop reason: HOSPADM

## 2021-10-14 RX ORDER — SODIUM CHLORIDE 0.9 % (FLUSH) 0.9 %
5-40 SYRINGE (ML) INJECTION EVERY 8 HOURS
Status: DISCONTINUED | OUTPATIENT
Start: 2021-10-14 | End: 2021-10-27 | Stop reason: HOSPADM

## 2021-10-14 RX ORDER — DEXAMETHASONE SODIUM PHOSPHATE 4 MG/ML
INJECTION, SOLUTION INTRA-ARTICULAR; INTRALESIONAL; INTRAMUSCULAR; INTRAVENOUS; SOFT TISSUE AS NEEDED
Status: DISCONTINUED | OUTPATIENT
Start: 2021-10-14 | End: 2021-10-14 | Stop reason: HOSPADM

## 2021-10-14 RX ORDER — BUPIVACAINE HYDROCHLORIDE 5 MG/ML
INJECTION, SOLUTION EPIDURAL; INTRACAUDAL AS NEEDED
Status: DISCONTINUED | OUTPATIENT
Start: 2021-10-14 | End: 2021-10-14 | Stop reason: HOSPADM

## 2021-10-14 RX ORDER — LIDOCAINE HYDROCHLORIDE 20 MG/ML
INJECTION, SOLUTION EPIDURAL; INFILTRATION; INTRACAUDAL; PERINEURAL AS NEEDED
Status: DISCONTINUED | OUTPATIENT
Start: 2021-10-14 | End: 2021-10-14 | Stop reason: HOSPADM

## 2021-10-14 RX ORDER — HYDROMORPHONE HYDROCHLORIDE 1 MG/ML
0.2 INJECTION, SOLUTION INTRAMUSCULAR; INTRAVENOUS; SUBCUTANEOUS
Status: DISCONTINUED | OUTPATIENT
Start: 2021-10-14 | End: 2021-10-14 | Stop reason: HOSPADM

## 2021-10-14 RX ORDER — LIDOCAINE HYDROCHLORIDE 10 MG/ML
0.1 INJECTION, SOLUTION EPIDURAL; INFILTRATION; INTRACAUDAL; PERINEURAL AS NEEDED
Status: DISCONTINUED | OUTPATIENT
Start: 2021-10-14 | End: 2021-10-14 | Stop reason: HOSPADM

## 2021-10-14 RX ORDER — SODIUM CHLORIDE 0.9 % (FLUSH) 0.9 %
5-40 SYRINGE (ML) INJECTION AS NEEDED
Status: DISCONTINUED | OUTPATIENT
Start: 2021-10-14 | End: 2021-10-27 | Stop reason: HOSPADM

## 2021-10-14 RX ADMIN — ALBUMIN (HUMAN) 250 ML: 12.5 INJECTION, SOLUTION INTRAVENOUS at 14:13

## 2021-10-14 RX ADMIN — ROCURONIUM BROMIDE 5 MG: 10 SOLUTION INTRAVENOUS at 13:24

## 2021-10-14 RX ADMIN — SUCCINYLCHOLINE CHLORIDE 120 MG: 20 INJECTION, SOLUTION INTRAMUSCULAR; INTRAVENOUS at 13:25

## 2021-10-14 RX ADMIN — DEXMEDETOMIDINE HYDROCHLORIDE 10 MCG: 100 INJECTION, SOLUTION, CONCENTRATE INTRAVENOUS at 15:11

## 2021-10-14 RX ADMIN — HYDROMORPHONE HYDROCHLORIDE 1 MG: 2 INJECTION, SOLUTION INTRAMUSCULAR; INTRAVENOUS; SUBCUTANEOUS at 13:37

## 2021-10-14 RX ADMIN — PHENYLEPHRINE HYDROCHLORIDE 20 MCG/MIN: 10 INJECTION INTRAVENOUS at 13:33

## 2021-10-14 RX ADMIN — DEXAMETHASONE SODIUM PHOSPHATE 4 MG: 4 INJECTION, SOLUTION INTRAMUSCULAR; INTRAVENOUS at 13:31

## 2021-10-14 RX ADMIN — POTASSIUM CHLORIDE 10 MEQ: 200 INJECTION, SOLUTION INTRAVENOUS at 13:30

## 2021-10-14 RX ADMIN — MIDAZOLAM 1 MG: 1 INJECTION INTRAMUSCULAR; INTRAVENOUS at 13:16

## 2021-10-14 RX ADMIN — PROPOFOL 200 MG: 10 INJECTION, EMULSION INTRAVENOUS at 13:24

## 2021-10-14 RX ADMIN — PIPERACILLIN AND TAZOBACTAM 3.38 G: 3; .375 INJECTION, POWDER, LYOPHILIZED, FOR SOLUTION INTRAVENOUS at 11:06

## 2021-10-14 RX ADMIN — ROCURONIUM BROMIDE 25 MG: 10 SOLUTION INTRAVENOUS at 13:31

## 2021-10-14 RX ADMIN — NEOSTIGMINE METHYLSULFATE 3 MG: 1 INJECTION, SOLUTION INTRAVENOUS at 15:10

## 2021-10-14 RX ADMIN — PIPERACILLIN AND TAZOBACTAM 3.38 G: 3; .375 INJECTION, POWDER, LYOPHILIZED, FOR SOLUTION INTRAVENOUS at 19:39

## 2021-10-14 RX ADMIN — Medication 3 MG: at 22:39

## 2021-10-14 RX ADMIN — POTASSIUM CHLORIDE 10 MEQ: 200 INJECTION, SOLUTION INTRAVENOUS at 14:45

## 2021-10-14 RX ADMIN — HYDROMORPHONE HYDROCHLORIDE 1 MG: 2 INJECTION, SOLUTION INTRAMUSCULAR; INTRAVENOUS; SUBCUTANEOUS at 13:55

## 2021-10-14 RX ADMIN — Medication 10 ML: at 20:45

## 2021-10-14 RX ADMIN — POTASSIUM CHLORIDE 10 MEQ: 200 INJECTION, SOLUTION INTRAVENOUS at 11:19

## 2021-10-14 RX ADMIN — ROCURONIUM BROMIDE 10 MG: 10 SOLUTION INTRAVENOUS at 14:16

## 2021-10-14 RX ADMIN — MIDAZOLAM 1 MG: 1 INJECTION INTRAMUSCULAR; INTRAVENOUS at 13:14

## 2021-10-14 RX ADMIN — POTASSIUM CHLORIDE, DEXTROSE MONOHYDRATE AND SODIUM CHLORIDE 125 ML/HR: 150; 5; 450 INJECTION, SOLUTION INTRAVENOUS at 16:21

## 2021-10-14 RX ADMIN — HYDROMORPHONE HYDROCHLORIDE 0.4 MG: 1 INJECTION, SOLUTION INTRAMUSCULAR; INTRAVENOUS; SUBCUTANEOUS at 20:45

## 2021-10-14 RX ADMIN — PIPERACILLIN AND TAZOBACTAM 3.38 G: 3; .375 INJECTION, POWDER, LYOPHILIZED, FOR SOLUTION INTRAVENOUS at 03:03

## 2021-10-14 RX ADMIN — POTASSIUM CHLORIDE 10 MEQ: 200 INJECTION, SOLUTION INTRAVENOUS at 12:10

## 2021-10-14 RX ADMIN — FENTANYL CITRATE 100 MCG: 50 INJECTION, SOLUTION INTRAMUSCULAR; INTRAVENOUS at 13:24

## 2021-10-14 RX ADMIN — ONDANSETRON HYDROCHLORIDE 4 MG: 2 INJECTION, SOLUTION INTRAMUSCULAR; INTRAVENOUS at 13:31

## 2021-10-14 RX ADMIN — SODIUM CHLORIDE, POTASSIUM CHLORIDE, SODIUM LACTATE AND CALCIUM CHLORIDE 125 ML/HR: 600; 310; 30; 20 INJECTION, SOLUTION INTRAVENOUS at 12:12

## 2021-10-14 RX ADMIN — SODIUM CHLORIDE 75 ML/HR: 900 INJECTION, SOLUTION INTRAVENOUS at 11:19

## 2021-10-14 RX ADMIN — LIDOCAINE HYDROCHLORIDE 80 MG: 20 INJECTION, SOLUTION EPIDURAL; INFILTRATION; INTRACAUDAL; PERINEURAL at 13:24

## 2021-10-14 RX ADMIN — GLYCOPYRROLATE 0.4 MG: 0.2 INJECTION, SOLUTION INTRAMUSCULAR; INTRAVENOUS at 15:10

## 2021-10-14 RX ADMIN — SODIUM CHLORIDE 40 MG: 9 INJECTION INTRAMUSCULAR; INTRAVENOUS; SUBCUTANEOUS at 11:06

## 2021-10-14 NOTE — PERIOP NOTES
TRANSFER - IN REPORT:    Verbal report received from [facility]) on Malachiimani Lobo.  being received from 5E(unit) for ordered procedure      Report consisted of patients Situation, Background, Assessment and   Recommendations(SBAR). Information from the following report(s) SBAR was reviewed with the receiving nurse. Opportunity for questions and clarification was provided. Assessment completed upon patients arrival to unit and care assumed.

## 2021-10-14 NOTE — PROGRESS NOTES
[facility] [facility] Adult  Hospitalist Group                                                                                          Hospitalist Progress Note  Garett Man MD  Answering service: [phone] from in house phone        Date of Service:  10/14/2021  NAME:  Chioma Childers. :  1955  MRN:  [id number]      Admission Summary:   77year old with past medical history chronic pancreatitis, s/p choledochojejunostomy for biliary stricture. Here with sepsis and found to have multiloculated abdominal fluid collection.          Interval history / Subjective:   Patient examined today, has no abdominal pain, no nausea vomiting, still having low-grade fever. Assessment & Plan:     Sepsis (fever, leukocytosis, tachycardia) with abdominal abscess in setting of chronic pancreatitis:  -CT abd/pelvis with multiloculated complex and septated collection adjacent to the tail of the pancreas and the gastric fundus  -general surgery consulted. Appreciate recs. Suspected infected pancreatic pseudocyst.  Patient getting procedure today.  -continue zosyn  WBC count very much remains the same despite the IV antibiotics  -follow blood cultures  -pain control, antiemetics  -ice chips    Hyponatremia:   -suspect due to volume depletion, continue IVFs  Hypokalemia: replete   Hypomagnesemia: repleted repeat levels tomorrow.       Anemia, normocytic: stable  -monitor closely, Hgb 7.4, transfuse for Hgb <7  Depression: home fluoxetine     Severe biliary strictures, s/p choledochojejunostomy on 2016    Code status: full   DVT prophylaxis: lovenox    Care Plan discussed with: Patient/Family and Nurse  Anticipated Disposition: Home w/Family  Anticipated Discharge: Greater than 48 hours     Hospital Problems  Date Reviewed: 2020        Codes Class Noted POA    Severe protein-calorie malnutrition ([facility] [address] ICD-10-CM: T96  ICD-9-CM: 135  10/13/2021 Yes        * (Principal) Sepsis ([facility] [address] ICD-10-CM: A41.9  ICD-9-CM: 038.9, 995.91  10/10/2021 Unknown                Review of Systems:     Negative unless stated above     Vital Signs:    Last 24hrs VS reviewed since prior progress note. Most recent are:  Visit Vitals  /74 (BP 1 Location: Left upper arm, BP Patient Position: At rest)   Pulse 87   Temp 99.1 °F (37.3 °C)   Resp 16   Ht 6' (1.829 m)   Wt 73 kg (161 lb)   SpO2 98%   BMI 21.84 kg/m²       No intake or output data in the 24 hours ending 10/14/21 1238     Physical Examination:     I had a face to face encounter with this patient and independently examined them on 10/14/2021 as outlined below:          Constitutional:  No acute distress, cooperative, pleasant, appears down    ENT:  Oral mucosa moist, oropharynx benign. Resp:  CTA bilaterally. No wheezing/rhonchi/rales. No accessory muscle use   CV:  Regular rhythm, normal rate, no murmurs, gallops, rubs    GI:  Soft, non distended, non tender to palpation. Well healed central vertical scar, No guarding hyperactive bowel sounds, no hepatosplenomegaly     Musculoskeletal:  No edema, warm, 2+ pulses throughout    Neurologic:  Moves all extremities. Data Review:    Review and/or order of clinical lab test  Review and/or order of tests in the radiology section of CPT  Review and/or order of tests in the medicine section of CPT      Labs:     Recent Labs     10/14/21  0524 10/12/21  0215   WBC 15.2* 15.3*   HGB 7.4* 7.5*   HCT 21.9* 22.5*   * 411*     Recent Labs     10/14/21  0524 10/13/21  0121 10/12/21  0215   * 133* 132*   K 3.1* 4.4 4.0    100 99   CO2 25 24 26   BUN 9 12 13   CREA 0.56* 0.66* 0.77   GLU 97 103* 84   CA 8.4* 8.1* 8.7   MG 1.7 2.0 1.4*   PHOS 2.0* 2.7 2.4*     No results for input(s): ALT, AP, TBIL, TBILI, TP, ALB, GLOB, GGT, AML, LPSE in the last 72 hours. No lab exists for component: SGOT, GPT, AMYP, HLPSE  No results for input(s): INR, PTP, APTT, INREXT, INREXT in the last 72 hours.    No results for input(s): FE, TIBC, PSAT, FERR in the last 72 hours. No results found for: FOL, RBCF   No results for input(s): PH, PCO2, PO2 in the last 72 hours. No results for input(s): CPK, CKNDX, TROIQ in the last 72 hours.     No lab exists for component: CPKMB  No results found for: CHOL, CHOLX, CHLST, CHOLV, HDL, HDLP, LDL, LDLC, DLDLP, TGLX, TRIGL, TRIGP, CHHD, CHHDX  Lab Results   Component Value Date/Time    Glucose (POC) 77 10/14/2021 12:14 PM    Glucose (POC) 102 (H) 02/20/2016 06:17 AM    Glucose (POC) 136 (H) 02/19/2016 10:57 PM    Glucose (POC) 116 (H) 02/19/2016 04:44 PM    Glucose (POC) 132 (H) 02/19/2016 11:43 AM     Lab Results   Component Value Date/Time    Color DARK YELLOW 02/08/2016 08:22 PM    Appearance CLOUDY (A) 02/08/2016 08:22 PM    Specific gravity 1.026 02/08/2016 08:22 PM    pH (UA) 5.5 02/08/2016 08:22 PM    Protein 100 (A) 02/08/2016 08:22 PM    Glucose NEGATIVE  02/08/2016 08:22 PM    Ketone 40 (A) 02/08/2016 08:22 PM    Bilirubin LARGE (A) 08/16/2014 04:35 AM    Urobilinogen 1.0 02/08/2016 08:22 PM    Nitrites NEGATIVE  02/08/2016 08:22 PM    Leukocyte Esterase SMALL (A) 02/08/2016 08:22 PM    Epithelial cells FEW 02/08/2016 08:22 PM    Bacteria NEGATIVE  02/08/2016 08:22 PM    WBC 10-20 02/08/2016 08:22 PM    RBC 0-5 02/08/2016 08:22 PM         Medications Reviewed:     Current Facility-Administered Medications   Medication Dose Route Frequency    lactated Ringers infusion  125 mL/hr IntraVENous CONTINUOUS    0.9% sodium chloride infusion  125 mL/hr IntraVENous CONTINUOUS    sodium chloride (NS) flush 5-40 mL  5-40 mL IntraVENous Q8H    sodium chloride (NS) flush 5-40 mL  5-40 mL IntraVENous PRN    lidocaine (PF) (XYLOCAINE) 10 mg/mL (1 %) injection 0.1 mL  0.1 mL SubCUTAneous PRN    fentaNYL citrate (PF) injection 50 mcg  50 mcg IntraVENous PRN    midazolam (VERSED) injection 1 mg  1 mg IntraVENous PRN    midazolam (VERSED) injection 1 mg  1 mg IntraVENous PRN    acetaminophen (TYLENOL) tablet 1,000 mg  1,000 mg Oral ONCE    0.9% sodium chloride infusion 250 mL  250 mL IntraVENous PRN    potassium chloride 10 mEq in 50 ml IVPB  10 mEq IntraVENous Q1H    FLUoxetine (PROzac) capsule 40 mg  40 mg Oral DAILY    pantoprazole (PROTONIX) 40 mg in 0.9% sodium chloride 10 mL injection  40 mg IntraVENous DAILY    piperacillin-tazobactam (ZOSYN) 3.375 g in 0.9% sodium chloride (MBP/ADV) 100 mL MBP  3.375 g IntraVENous Q8H    0.9% sodium chloride infusion  75 mL/hr IntraVENous CONTINUOUS    melatonin tablet 3 mg  3 mg Oral QHS    sodium chloride (NS) flush 5-40 mL  5-40 mL IntraVENous Q8H    sodium chloride (NS) flush 5-40 mL  5-40 mL IntraVENous PRN    acetaminophen (TYLENOL) tablet 650 mg  650 mg Oral Q6H PRN    Or    acetaminophen (TYLENOL) suppository 650 mg  650 mg Rectal Q6H PRN    polyethylene glycol (MIRALAX) packet 17 g  17 g Oral DAILY PRN    ondansetron (ZOFRAN ODT) tablet 4 mg  4 mg Oral Q8H PRN    Or    ondansetron (ZOFRAN) injection 4 mg  4 mg IntraVENous Q6H PRN    [Held by provider] enoxaparin (LOVENOX) injection 40 mg  40 mg SubCUTAneous DAILY    HYDROmorphone (DILAUDID) injection 0.4 mg  0.4 mg IntraVENous Q4H PRN     ______________________________________________________________________  EXPECTED LENGTH OF STAY: 3d 12h  ACTUAL LENGTH OF STAY:          4                Aston Rene MD a

## 2021-10-14 NOTE — PERIOP NOTES
TRANSFER - OUT REPORT:    Verbal report given to Al Dean RN (name) on Dani Jett.  being transferred to [address] [place] [address] (unit) for routine post - op       Report consisted of patients Situation, Background, Assessment and   Recommendations(SBAR). Time Pre op antibiotic given: scheduled  Anesthesia Stop time:  9730  Hernández Present on Transfer to floor: No  Order for Hernández on Chart: N/A  Discharge Prescriptions with Chart: N/A    Information from the following report(s) OR Summary, Intake/Output, MAR, Recent Results, Med Rec Status and Cardiac Rhythm SR was reviewed with the receiving nurse. Opportunity for questions and clarification was provided. Is the patient on 02? YES       L/Min 2    Is the patient on a monitor? NO    Is the nurse transporting with the patient? NO    Surgical Waiting Area notified of patient's transfer from PACU? YES      The following personal items collected during your admission accompanied patient upon transfer:   Dental Appliance: Dental Appliances:  At home  Vision:    Hearing Aid:    Jewelry:    Clothing:    Other Valuables:    Valuables sent to safe:

## 2021-10-14 NOTE — PROGRESS NOTES
Has not received covid vaccine. Dg Harman Peg expressing interest in receiving J&J vaccine prior to discharge. Surgery scheduled for today. Will follow up closer to discharge.

## 2021-10-14 NOTE — PROGRESS NOTES
Bedside shift change report given to Ernst Galeazzi (oncoming nurse) by Kandice Lozada RN (offgoing nurse). Report included the following information SBAR, Kardex, Procedure Summary, Intake/Output, MAR and Recent Results.

## 2021-10-14 NOTE — BRIEF OP NOTE
Brief Postoperative Note    Patient: Jennifer Espinosa. YOB: 1955  MRN: [id number]    Date of Procedure: 10/14/2021     Pre-Op Diagnosis: PANCREATIC ABSCESS    Post-Op Diagnosis: Same as preoperative diagnosis.       Procedure(s):  LAPAROSCOPIC  DRAINAGE OF PANCREATIC ABCESS    Surgeon(s):  Chidi Hughes MD    Surgical Assistant: Surg Asst-1: Alan Feliz    Anesthesia: General     Estimated Blood Loss (mL): 415     Complications: None    Specimens:   ID Type Source Tests Collected by Time Destination   1 : pancreatic abcess Abscess  AEROBIC CULTURE + GRAM STAIN, AEROBIC/ANAEROBIC CULTURE Chidi Hughes MD 10/14/2021 1443 Microbiology        Implants: * No implants in log *    Drains:   Fiorella Fried #1 10/14/21 Left;Upper Abdomen (Active)   Site Assessment Clean, dry, & intact 10/14/21 1529   Dressing Status Clean, dry, & intact 10/14/21 1529   Drainage Description Sanguinous 10/14/21 1529   Jl Drain Airleak No 10/14/21 1529   Status Charged;Draining;Patent 10/14/21 1529   Y Connector Used No 10/14/21 1529   Drainage Chamber Level (ml) 0 ml 10/14/21 1529   Output (ml) 0 ml 10/14/21 1529       Findings: PUS BEHIND THE STOMACH    Electronically Signed by Blanca Guo MD on 10/14/2021 at 4:01 PM  [id number]

## 2021-10-14 NOTE — PROGRESS NOTES
RUR: 16% Low     ARAM: Anticipated discharge home with wife. Wife will provide transport home once medically stable. Follow-up with PCP/specialist.     Primary Contact: [employer], [phone]    CM reviewed chart. Patient continues to receive IV antibiotics. Rapid Covid test ordered prior to surgery, with negative results. Patient scheduled for OR today for drainage of pancreatic abscess. Discharge pending medical progress. CM to continue to follow for transitions of care.     Héctor Mehta, VEL   [phone]

## 2021-10-14 NOTE — ANESTHESIA PREPROCEDURE EVALUATION
Anesthetic History   No history of anesthetic complications            Review of Systems / Medical History  Patient summary reviewed, nursing notes reviewed and pertinent labs reviewed    Pulmonary  Within defined limits                 Neuro/Psych   Within defined limits           Cardiovascular    Hypertension: well controlled                   GI/Hepatic/Renal     GERD: well controlled           Endo/Other  Within defined limits           Other Findings   Comments: Pancreatic abcess           Physical Exam    Airway  Mallampati: II  TM Distance: > 6 cm  Neck ROM: normal range of motion   Mouth opening: Normal     Cardiovascular  Regular rate and rhythm,  S1 and S2 normal,  no murmur, click, rub, or gallop             Dental  No notable dental hx       Pulmonary  Breath sounds clear to auscultation               Abdominal  GI exam deferred       Other Findings            Anesthetic Plan    ASA: 3  Anesthesia type: general          Induction: Intravenous  Anesthetic plan and risks discussed with: Patient

## 2021-10-14 NOTE — ROUTINE PROCESS
Pt's consent form signed and placed in chart. Rapid covid ordered for surgery. RN asked the lab to send up covid swab.

## 2021-10-15 LAB
ANION GAP SERPL CALC-SCNC: 6 MMOL/L (ref 5–15)
BACTERIA SPEC CULT: NORMAL
BASOPHILS # BLD: 0 K/UL (ref 0–0.1)
BASOPHILS NFR BLD: 0 % (ref 0–1)
BUN SERPL-MCNC: 10 MG/DL (ref 6–20)
BUN/CREAT SERPL: 13 (ref 12–20)
CALCIUM SERPL-MCNC: 8.3 MG/DL (ref 8.5–10.1)
CHLORIDE SERPL-SCNC: 102 MMOL/L (ref 97–108)
CO2 SERPL-SCNC: 27 MMOL/L (ref 21–32)
CREAT SERPL-MCNC: 0.76 MG/DL (ref 0.7–1.3)
DIFFERENTIAL METHOD BLD: ABNORMAL
EOSINOPHIL # BLD: 0 K/UL (ref 0–0.4)
EOSINOPHIL NFR BLD: 0 % (ref 0–7)
ERYTHROCYTE [DISTWIDTH] IN BLOOD BY AUTOMATED COUNT: 14.6 % (ref 11.5–14.5)
EST. AVERAGE GLUCOSE BLD GHB EST-MCNC: 128 MG/DL
GLUCOSE SERPL-MCNC: 220 MG/DL (ref 65–100)
HBA1C MFR BLD: 6.1 % (ref 4–5.6)
HCT VFR BLD AUTO: 22.1 % (ref 36.6–50.3)
HGB BLD-MCNC: 7.4 G/DL (ref 12.1–17)
IMM GRANULOCYTES # BLD AUTO: 0.2 K/UL (ref 0–0.04)
IMM GRANULOCYTES NFR BLD AUTO: 1 % (ref 0–0.5)
LYMPHOCYTES # BLD: 0.8 K/UL (ref 0.8–3.5)
LYMPHOCYTES NFR BLD: 5 % (ref 12–49)
MAGNESIUM SERPL-MCNC: 1.8 MG/DL (ref 1.6–2.4)
MCH RBC QN AUTO: 28.5 PG (ref 26–34)
MCHC RBC AUTO-ENTMCNC: 33.5 G/DL (ref 30–36.5)
MCV RBC AUTO: 85 FL (ref 80–99)
MONOCYTES # BLD: 0.8 K/UL (ref 0–1)
MONOCYTES NFR BLD: 5 % (ref 5–13)
NEUTS SEG # BLD: 13.5 K/UL (ref 1.8–8)
NEUTS SEG NFR BLD: 89 % (ref 32–75)
NRBC # BLD: 0 K/UL (ref 0–0.01)
NRBC BLD-RTO: 0 PER 100 WBC
PHOSPHATE SERPL-MCNC: 1.8 MG/DL (ref 2.6–4.7)
PLATELET # BLD AUTO: 501 K/UL (ref 150–400)
PMV BLD AUTO: 8.6 FL (ref 8.9–12.9)
POTASSIUM SERPL-SCNC: 3.9 MMOL/L (ref 3.5–5.1)
RBC # BLD AUTO: 2.6 M/UL (ref 4.1–5.7)
RBC MORPH BLD: ABNORMAL
RBC MORPH BLD: ABNORMAL
SERVICE CMNT-IMP: NORMAL
SODIUM SERPL-SCNC: 135 MMOL/L (ref 136–145)
WBC # BLD AUTO: 15.3 K/UL (ref 4.1–11.1)

## 2021-10-15 PROCEDURE — 74011250637 HC RX REV CODE- 250/637: Performed by: INTERNAL MEDICINE

## 2021-10-15 PROCEDURE — 74011000258 HC RX REV CODE- 258: Performed by: SURGERY

## 2021-10-15 PROCEDURE — C9113 INJ PANTOPRAZOLE SODIUM, VIA: HCPCS | Performed by: NURSE PRACTITIONER

## 2021-10-15 PROCEDURE — 74011250637 HC RX REV CODE- 250/637: Performed by: NURSE PRACTITIONER

## 2021-10-15 PROCEDURE — 65270000029 HC RM PRIVATE

## 2021-10-15 PROCEDURE — 83735 ASSAY OF MAGNESIUM: CPT

## 2021-10-15 PROCEDURE — 74011250636 HC RX REV CODE- 250/636: Performed by: NURSE PRACTITIONER

## 2021-10-15 PROCEDURE — 77010033678 HC OXYGEN DAILY

## 2021-10-15 PROCEDURE — 84100 ASSAY OF PHOSPHORUS: CPT

## 2021-10-15 PROCEDURE — 80048 BASIC METABOLIC PNL TOTAL CA: CPT

## 2021-10-15 PROCEDURE — 85025 COMPLETE CBC W/AUTO DIFF WBC: CPT

## 2021-10-15 PROCEDURE — 74011250636 HC RX REV CODE- 250/636: Performed by: SURGERY

## 2021-10-15 PROCEDURE — 94760 N-INVAS EAR/PLS OXIMETRY 1: CPT

## 2021-10-15 PROCEDURE — 83036 HEMOGLOBIN GLYCOSYLATED A1C: CPT

## 2021-10-15 PROCEDURE — 36415 COLL VENOUS BLD VENIPUNCTURE: CPT

## 2021-10-15 PROCEDURE — 99024 POSTOP FOLLOW-UP VISIT: CPT | Performed by: NURSE PRACTITIONER

## 2021-10-15 PROCEDURE — 51798 US URINE CAPACITY MEASURE: CPT

## 2021-10-15 RX ORDER — OXYCODONE AND ACETAMINOPHEN 5; 325 MG/1; MG/1
2 TABLET ORAL
Status: DISCONTINUED | OUTPATIENT
Start: 2021-10-15 | End: 2021-10-18

## 2021-10-15 RX ORDER — SODIUM,POTASSIUM PHOSPHATES 280-250MG
1 POWDER IN PACKET (EA) ORAL EVERY 8 HOURS
Status: DISCONTINUED | OUTPATIENT
Start: 2021-10-15 | End: 2021-10-16

## 2021-10-15 RX ORDER — INSULIN LISPRO 100 [IU]/ML
INJECTION, SOLUTION INTRAVENOUS; SUBCUTANEOUS
Status: DISCONTINUED | OUTPATIENT
Start: 2021-10-15 | End: 2021-10-15

## 2021-10-15 RX ORDER — DEXTROSE 50 % IN WATER (D50W) INTRAVENOUS SYRINGE
25-50 AS NEEDED
Status: DISCONTINUED | OUTPATIENT
Start: 2021-10-15 | End: 2021-10-15

## 2021-10-15 RX ORDER — OXYCODONE AND ACETAMINOPHEN 5; 325 MG/1; MG/1
1 TABLET ORAL
Status: DISCONTINUED | OUTPATIENT
Start: 2021-10-15 | End: 2021-10-18

## 2021-10-15 RX ORDER — MAGNESIUM SULFATE 100 %
4 CRYSTALS MISCELLANEOUS AS NEEDED
Status: DISCONTINUED | OUTPATIENT
Start: 2021-10-15 | End: 2021-10-15

## 2021-10-15 RX ADMIN — POTASSIUM & SODIUM PHOSPHATES POWDER PACK 280-160-250 MG 1 PACKET: 280-160-250 PACK at 15:27

## 2021-10-15 RX ADMIN — PIPERACILLIN AND TAZOBACTAM 3.38 G: 3; .375 INJECTION, POWDER, LYOPHILIZED, FOR SOLUTION INTRAVENOUS at 18:12

## 2021-10-15 RX ADMIN — Medication 10 ML: at 22:04

## 2021-10-15 RX ADMIN — Medication 3 MG: at 21:25

## 2021-10-15 RX ADMIN — SODIUM CHLORIDE 40 MG: 9 INJECTION INTRAMUSCULAR; INTRAVENOUS; SUBCUTANEOUS at 08:40

## 2021-10-15 RX ADMIN — POTASSIUM CHLORIDE, DEXTROSE MONOHYDRATE AND SODIUM CHLORIDE 125 ML/HR: 150; 5; 450 INJECTION, SOLUTION INTRAVENOUS at 09:31

## 2021-10-15 RX ADMIN — PIPERACILLIN AND TAZOBACTAM 3.38 G: 3; .375 INJECTION, POWDER, LYOPHILIZED, FOR SOLUTION INTRAVENOUS at 11:20

## 2021-10-15 RX ADMIN — FLUOXETINE 40 MG: 20 CAPSULE ORAL at 08:40

## 2021-10-15 RX ADMIN — BENZOCAINE AND MENTHOL 1 LOZENGE: 15; 3.6 LOZENGE ORAL at 04:59

## 2021-10-15 RX ADMIN — Medication 10 ML: at 21:25

## 2021-10-15 RX ADMIN — POTASSIUM & SODIUM PHOSPHATES POWDER PACK 280-160-250 MG 1 PACKET: 280-160-250 PACK at 21:25

## 2021-10-15 RX ADMIN — POTASSIUM CHLORIDE, DEXTROSE MONOHYDRATE AND SODIUM CHLORIDE 125 ML/HR: 150; 5; 450 INJECTION, SOLUTION INTRAVENOUS at 18:12

## 2021-10-15 RX ADMIN — PIPERACILLIN AND TAZOBACTAM 3.38 G: 3; .375 INJECTION, POWDER, LYOPHILIZED, FOR SOLUTION INTRAVENOUS at 03:50

## 2021-10-15 RX ADMIN — OXYCODONE AND ACETAMINOPHEN 2 TABLET: 5; 325 TABLET ORAL at 15:41

## 2021-10-15 RX ADMIN — HYDROMORPHONE HYDROCHLORIDE 0.4 MG: 1 INJECTION, SOLUTION INTRAMUSCULAR; INTRAVENOUS; SUBCUTANEOUS at 11:07

## 2021-10-15 NOTE — PROGRESS NOTES
NUTRITION brief     Chart reviewed for diet advancement. S/p drainage of pancreatic cyst yesterday. Happy to see diet advancing to clear liquids today. Will add Ensure Clear QID (960kcal, 32g protein) since drinking well at home. Will follow for further advancement and tolerance on Monday if remains inpatient. See full RD assessment from 10/13 for additional details, goals, and monitoring/evaluation. No results noted for vitamin A/E. Vitamin D borderline low.      Medina Jordan, CIRILO [facility], Contact via paymio

## 2021-10-15 NOTE — ANESTHESIA POSTPROCEDURE EVALUATION
Procedure(s):  LAPAROSCOPIC POSSIBLE OPEN DRAINAGE OF PANCREATIC ABCESS. general    Anesthesia Post Evaluation        Patient participation: complete - patient participated  Level of consciousness: awake  Pain management: adequate  Airway patency: patent  Anesthetic complications: no  Cardiovascular status: hemodynamically stable  Respiratory status: acceptable  Hydration status: acceptable  Comments: The patient is ready for PACU discharge. Gris Diana DO                   Post anesthesia nausea and vomiting:  controlled      INITIAL Post-op Vital signs:   Vitals Value Taken Time   /79 10/14/21 1630   Temp 36.7 °C (98 °F) 10/14/21 1600   Pulse 88 10/14/21 1641   Resp 14 10/14/21 1641   SpO2 99 % 10/14/21 1641   Vitals shown include unvalidated device data.

## 2021-10-15 NOTE — PROGRESS NOTES
RUR: 15% Low     RAAM: Anticipated discharge home with wife. Wife will provide transport home once medically stable. Follow-up with PCP/specialist.     POD#1 drainage of pancreatic abscess. Primary Contact: Wife, Erik Walls, [phone]     CM reviewed chart. Patient POD#1 for drainage of pancreatic abscess. Per interdisciplinary rounds, possible weekend discharge, pending medical progress. Awaiting results from cultures obtained from the pus of abscess. No home health needs indicated at this time for discharge. CM to continue to follow for transitions of care.     Narinder Bal, VEL   [phone]

## 2021-10-15 NOTE — PROGRESS NOTES
[facility]                                                                                          Hospitalist Progress Note  Pari Mathew MD  Answering service: [phone] from in house phone        Date of Service:  10/15/2021  NAME:  Abdiaziz Penaloza. :  1955  MRN:  [id number]      Admission Summary:   77year old with past medical history chronic pancreatitis, s/p choledochojejunostomy for biliary stricture. Here with sepsis and found to have multiloculated abdominal fluid collection.          Interval history / Subjective:   Patient examined today, tolerating clears  S/p surgery yesterday     Assessment & Plan:     Pancreatic abscess s/p laparoscopic drainage 10/14  Mgmt per surgery, now primary    Hypophos, replete  Hypokalemia: repleted  Hypomagnesemia: repleted    Anemia, normocytic: stable  -monitor closely, Hgb 7.4, transfuse for Hgb <7    Depression: home fluoxetine     Severe biliary strictures, s/p choledochojejunostomy on 2016    Hyperglycemia, likely 2/2 steroid injection, check a1c       Hospital Problems  Date Reviewed: 2020        Codes Class Noted POA    Severe protein-calorie malnutrition ([facility] [address] ICD-10-CM: D66  ICD-9-CM: 359  10/13/2021 Yes        * (Principal) Sepsis ([facility] [address] ICD-10-CM: A41.9  ICD-9-CM: 038.9, 995.91  10/10/2021 Unknown                Review of Systems:     Negative unless stated above     Vital Signs:    Last 24hrs VS reviewed since prior progress note.  Most recent are:  Visit Vitals  BP (!) 142/87   Pulse 90   Temp 98.1 °F (36.7 °C)   Resp 17   Ht 6' (1.829 m)   Wt 73 kg (161 lb)   SpO2 98%   BMI 21.84 kg/m²         Intake/Output Summary (Last 24 hours) at 10/15/2021 1322  Last data filed at 10/15/2021 1131  Gross per 24 hour   Intake 1250 ml   Output 1490 ml   Net -240 ml        Physical Examination:     I had a face to face encounter with this patient and independently examined them on 10/15/2021 as outlined below:          Constitutional:  No acute distress, cooperative, pleasant   ENT:  Oral mucosa moist, oropharynx benign   Resp:  CTA bilaterally. No wheezing/rhonchi/rales. No accessory muscle use   CV:  Regular rhythm, normal rate    GI:  Soft, non distended, appropriately tender    Musculoskeletal:  No edema, warm    Neurologic:  Moves all extremities            Data Review:    Review and/or order of clinical lab test  Review and/or order of tests in the radiology section of CPT  Review and/or order of tests in the medicine section of CPT      Labs:     Recent Labs     10/15/21  0348 10/14/21  1541   WBC 15.3* 22.4*   HGB 7.4* 7.3*   HCT 22.1* 22.1*   * 539*     Recent Labs     10/15/21  0348 10/14/21  0524 10/13/21  0121   * 134* 133*   K 3.9 3.1* 4.4    100 100   CO2 27 25 24   BUN 10 9 12   CREA 0.76 0.56* 0.66*   * 97 103*   CA 8.3* 8.4* 8.1*   MG 1.8 1.7 2.0   PHOS 1.8* 2.0* 2.7     No results for input(s): ALT, AP, TBIL, TBILI, TP, ALB, GLOB, GGT, AML, LPSE in the last 72 hours. No lab exists for component: SGOT, GPT, AMYP, HLPSE  No results for input(s): INR, PTP, APTT, INREXT, INREXT in the last 72 hours. No results for input(s): FE, TIBC, PSAT, FERR in the last 72 hours. No results found for: FOL, RBCF   No results for input(s): PH, PCO2, PO2 in the last 72 hours. No results for input(s): CPK, CKNDX, TROIQ in the last 72 hours.     No lab exists for component: CPKMB  No results found for: CHOL, CHOLX, CHLST, CHOLV, HDL, HDLP, LDL, LDLC, DLDLP, TGLX, TRIGL, TRIGP, CHHD, CHHDX  Lab Results   Component Value Date/Time    Glucose (POC) 77 10/14/2021 12:14 PM    Glucose (POC) 102 (H) 02/20/2016 06:17 AM    Glucose (POC) 136 (H) 02/19/2016 10:57 PM    Glucose (POC) 116 (H) 02/19/2016 04:44 PM    Glucose (POC) 132 (H) 02/19/2016 11:43 AM     Lab Results   Component Value Date/Time    Color DARK YELLOW 02/08/2016 08:22 PM    Appearance CLOUDY (A) 02/08/2016 08:22 PM    Specific gravity 1.026 02/08/2016 08:22 PM    pH (UA) 5.5 02/08/2016 08:22 PM    Protein 100 (A) 02/08/2016 08:22 PM    Glucose NEGATIVE  02/08/2016 08:22 PM    Ketone 40 (A) 02/08/2016 08:22 PM    Bilirubin LARGE (A) 08/16/2014 04:35 AM    Urobilinogen 1.0 02/08/2016 08:22 PM    Nitrites NEGATIVE  02/08/2016 08:22 PM    Leukocyte Esterase SMALL (A) 02/08/2016 08:22 PM    Epithelial cells FEW 02/08/2016 08:22 PM    Bacteria NEGATIVE  02/08/2016 08:22 PM    WBC 10-20 02/08/2016 08:22 PM    RBC 0-5 02/08/2016 08:22 PM         Medications Reviewed:     Current Facility-Administered Medications   Medication Dose Route Frequency    benzocaine-menthoL (CEPACOL) lozenge 1 Lozenge  1 Lozenge Mucous Membrane PRN    0.9% sodium chloride infusion 250 mL  250 mL IntraVENous PRN    sodium chloride (NS) flush 5-40 mL  5-40 mL IntraVENous Q8H    sodium chloride (NS) flush 5-40 mL  5-40 mL IntraVENous PRN    dextrose 5% - 0.45% NaCl with KCl 20 mEq/L infusion  125 mL/hr IntraVENous CONTINUOUS    FLUoxetine (PROzac) capsule 40 mg  40 mg Oral DAILY    pantoprazole (PROTONIX) 40 mg in 0.9% sodium chloride 10 mL injection  40 mg IntraVENous DAILY    piperacillin-tazobactam (ZOSYN) 3.375 g in 0.9% sodium chloride (MBP/ADV) 100 mL MBP  3.375 g IntraVENous Q8H    0.9% sodium chloride infusion  75 mL/hr IntraVENous CONTINUOUS    melatonin tablet 3 mg  3 mg Oral QHS    sodium chloride (NS) flush 5-40 mL  5-40 mL IntraVENous Q8H    sodium chloride (NS) flush 5-40 mL  5-40 mL IntraVENous PRN    acetaminophen (TYLENOL) tablet 650 mg  650 mg Oral Q6H PRN    Or    acetaminophen (TYLENOL) suppository 650 mg  650 mg Rectal Q6H PRN    polyethylene glycol (MIRALAX) packet 17 g  17 g Oral DAILY PRN    ondansetron (ZOFRAN ODT) tablet 4 mg  4 mg Oral Q8H PRN    Or    ondansetron (ZOFRAN) injection 4 mg  4 mg IntraVENous Q6H PRN    [Held by provider] enoxaparin (LOVENOX) injection 40 mg  40 mg SubCUTAneous DAILY    HYDROmorphone (DILAUDID) injection 0.4 mg  0.4 mg IntraVENous Q4H PRN     ______________________________________________________________________  EXPECTED LENGTH OF STAY: 3d 12h  ACTUAL LENGTH OF STAY:          2 [place] MD katherine Deutsch

## 2021-10-15 NOTE — PROGRESS NOTES
General Surgery Daily Progress Note    Admit Date: 10/10/2021  Post-Operative Day: 1 Day Post-Op from Procedure(s):  LAPAROSCOPIC POSSIBLE OPEN DRAINAGE OF PANCREATIC ABCESS     Subjective:     Last 24 hrs: pt is feeling much better as compared to pre op. Not much pain unless when moving. Voiding well   C/o dry mouth    Objective:     Blood pressure (!) 142/87, pulse 81, temperature 98.1 °F (36.7 °C), resp. rate 17, height 6' (1.829 m), weight 161 lb (73 kg), SpO2 98 %. Temp (24hrs), Av.5 °F (36.9 °C), Min:98 °F (36.7 °C), Max:99.4 °F (37.4 °C)      _____________________  Physical Exam:     Alert and Oriented, x3, in no acute distress. Cardiovascular: RRR, no peripheral edema  Abdomen: flat, +BS, lap sites intact; CLARKE w/ ss drainage      Assessment:   Principal Problem:    Sepsis ([facility] [address] (10/10/2021)    Active Problems:    Severe protein-calorie malnutrition ([facility] [address] (10/13/2021)            Plan:     May have clear liq diet  Pain mgmt  OOB  Cont zosyn  PPI  SCDs  Am labs    Data Review:    Recent Labs     10/15/21  0348 10/14/21  1541 10/14/21  1309   WBC 15.3* 22.4* 14.9*   HGB 7.4* 7.3* 7.3*   HCT 22.1* 22.1* 21.9*   * 539* 507*     Recent Labs     10/15/21  0348 10/14/21  0524 10/13/21  0121   * 134* 133*   K 3.9 3.1* 4.4    100 100   CO2 27 25 24   * 97 103*   BUN 10 9 12   CREA 0.76 0.56* 0.66*   CA 8.3* 8.4* 8.1*   MG 1.8 1.7 2.0   PHOS 1.8* 2.0* 2.7     No results for input(s): AML, LPSE in the last 72 hours.         ______________________  Medications:    Current Facility-Administered Medications   Medication Dose Route Frequency    benzocaine-menthoL (CEPACOL) lozenge 1 Lozenge  1 Lozenge Mucous Membrane PRN    0.9% sodium chloride infusion 250 mL  250 mL IntraVENous PRN    sodium chloride (NS) flush 5-40 mL  5-40 mL IntraVENous Q8H    sodium chloride (NS) flush 5-40 mL  5-40 mL IntraVENous PRN    dextrose 5% - 0.45% NaCl with KCl 20 mEq/L infusion  125 mL/hr IntraVENous CONTINUOUS    FLUoxetine (PROzac) capsule 40 mg  40 mg Oral DAILY    pantoprazole (PROTONIX) 40 mg in 0.9% sodium chloride 10 mL injection  40 mg IntraVENous DAILY    piperacillin-tazobactam (ZOSYN) 3.375 g in 0.9% sodium chloride (MBP/ADV) 100 mL MBP  3.375 g IntraVENous Q8H    0.9% sodium chloride infusion  75 mL/hr IntraVENous CONTINUOUS    melatonin tablet 3 mg  3 mg Oral QHS    sodium chloride (NS) flush 5-40 mL  5-40 mL IntraVENous Q8H    sodium chloride (NS) flush 5-40 mL  5-40 mL IntraVENous PRN    acetaminophen (TYLENOL) tablet 650 mg  650 mg Oral Q6H PRN    Or    acetaminophen (TYLENOL) suppository 650 mg  650 mg Rectal Q6H PRN    polyethylene glycol (MIRALAX) packet 17 g  17 g Oral DAILY PRN    ondansetron (ZOFRAN ODT) tablet 4 mg  4 mg Oral Q8H PRN    Or    ondansetron (ZOFRAN) injection 4 mg  4 mg IntraVENous Q6H PRN    [Held by provider] enoxaparin (LOVENOX) injection 40 mg  40 mg SubCUTAneous DAILY    HYDROmorphone (DILAUDID) injection 0.4 mg  0.4 mg IntraVENous Q4H PRN       Timothy Gonzalez NP  10/15/2021  ATTENDING ADDENDUM  I supervised the APC and reviewed the note. We discussed the plan of care  No temperature spike and he looks a lot better. CLARKE drainage is serousanguinous today. Will need another CT in several days to assess progress.

## 2021-10-15 NOTE — OP NOTES
[address] [facility]  OPERATIVE REPORT    Name:  Samm Ayers  MR#:  [id number]  :  1955  ACCOUNT #:  [de-identified]  DATE OF SERVICE:  10/14/2021      PREOPERATIVE DIAGNOSIS:  Pancreatic abscess. POSTOPERATIVE DIAGNOSIS:  Pancreatic abscess. PROCEDURE PERFORMED:  Laparoscopic drainage of pancreatic abscess. SURGEON:  Lazaro Roldan MD    ASSISTANT:  Jung Chatman. ANESTHESIA:  General.    COMPLICATIONS:  None. SPECIMENS REMOVED:  Cultures from the pus of the abscess. IMPLANTS:  None. ESTIMATED BLOOD LOSS:  200 mL. DRAINS:  One #19 Jl drain into the cavity. FINDINGS:  Marked amount of creamy pus behind the stomach just above the pancreas. PROCEDURE:  With the patient supine and suitably anesthetized, the abdomen was prepared with ChloraPrep and draped as a field. The abdomen was entered with a 5-mm trocar under direct vision and the pneumoperitoneum was established. This was done in the left mid abdomen. A midline and right upper abdominal wall trocars were placed and adhesions to an incisional hernia that he has as well as to small bowel were taken down using the LigaSure. The lesser sac as it were was entered, and after  the colon down and the stomach up, it was clear there was a pus pocket behind the stomach above the pancreas and this was entered and the pus was drained and cultured. I got at least 100 mL of pus out and then placed a 19 Jl drain into it through a fourth trocar that had been placed in the left upper abdomen. This trocar was removed and the drain was sewn into place. The 12-mm trocar was removed and the fascial defect there was closed with a 0 Vicryl passed with an Endo Close device. The pneumoperitoneum was released and the catheter was clearly staying in place where it belongs in the abscess cavity. Two 5-mm trocars were removed.   All trocar sites were reinfused with Marcaine and then closed with subcuticular Monocryl followed by Dermabond. At the termination of procedure, all counts were correct. The patient tolerated this well and was brought to the PACU in satisfactory condition.         Elisabeth Velazquez MD      [id number]/  D:  10/14/2021 16:08  T:  10/14/2021 21:05  JOB #:  [id number]  CC:  MD Tari Bethea MD

## 2021-10-15 NOTE — PROGRESS NOTES
Spiritual Care Partner Volunteer visited patient in Rm 510 on 10/15/2021. Documented by:   Chaplain Madrigal MDiv, MACE  [phone])

## 2021-10-15 NOTE — PROGRESS NOTES
Pt given minimal ice chips throughout shift due to sore/ dry throat. Pt remains NPO. Order placed for throat lozenges per MD Lei Garcia.

## 2021-10-16 LAB
ANION GAP SERPL CALC-SCNC: 5 MMOL/L (ref 5–15)
BASOPHILS # BLD: 0 K/UL (ref 0–0.1)
BASOPHILS NFR BLD: 0 % (ref 0–1)
BUN SERPL-MCNC: 8 MG/DL (ref 6–20)
BUN/CREAT SERPL: 12 (ref 12–20)
CALCIUM SERPL-MCNC: 7.7 MG/DL (ref 8.5–10.1)
CHLORIDE SERPL-SCNC: 101 MMOL/L (ref 97–108)
CO2 SERPL-SCNC: 28 MMOL/L (ref 21–32)
CREAT SERPL-MCNC: 0.68 MG/DL (ref 0.7–1.3)
DIFFERENTIAL METHOD BLD: ABNORMAL
EOSINOPHIL # BLD: 0.1 K/UL (ref 0–0.4)
EOSINOPHIL NFR BLD: 1 % (ref 0–7)
ERYTHROCYTE [DISTWIDTH] IN BLOOD BY AUTOMATED COUNT: 14.5 % (ref 11.5–14.5)
GLUCOSE SERPL-MCNC: 152 MG/DL (ref 65–100)
HCT VFR BLD AUTO: 20.9 % (ref 36.6–50.3)
HGB BLD-MCNC: 6.8 G/DL (ref 12.1–17)
IMM GRANULOCYTES # BLD AUTO: 0.1 K/UL (ref 0–0.04)
IMM GRANULOCYTES NFR BLD AUTO: 1 % (ref 0–0.5)
LYMPHOCYTES # BLD: 0.8 K/UL (ref 0.8–3.5)
LYMPHOCYTES NFR BLD: 6 % (ref 12–49)
MAGNESIUM SERPL-MCNC: 1.6 MG/DL (ref 1.6–2.4)
MCH RBC QN AUTO: 28 PG (ref 26–34)
MCHC RBC AUTO-ENTMCNC: 32.5 G/DL (ref 30–36.5)
MCV RBC AUTO: 86 FL (ref 80–99)
MONOCYTES # BLD: 0.8 K/UL (ref 0–1)
MONOCYTES NFR BLD: 6 % (ref 5–13)
NEUTS SEG # BLD: 11.2 K/UL (ref 1.8–8)
NEUTS SEG NFR BLD: 86 % (ref 32–75)
NRBC # BLD: 0 K/UL (ref 0–0.01)
NRBC BLD-RTO: 0 PER 100 WBC
PHOSPHATE SERPL-MCNC: 1.1 MG/DL (ref 2.6–4.7)
PLATELET # BLD AUTO: 452 K/UL (ref 150–400)
PMV BLD AUTO: 8.5 FL (ref 8.9–12.9)
POTASSIUM SERPL-SCNC: 3.2 MMOL/L (ref 3.5–5.1)
RBC # BLD AUTO: 2.43 M/UL (ref 4.1–5.7)
RBC MORPH BLD: ABNORMAL
SODIUM SERPL-SCNC: 134 MMOL/L (ref 136–145)
WBC # BLD AUTO: 13 K/UL (ref 4.1–11.1)

## 2021-10-16 PROCEDURE — 85025 COMPLETE CBC W/AUTO DIFF WBC: CPT

## 2021-10-16 PROCEDURE — 74011250636 HC RX REV CODE- 250/636: Performed by: INTERNAL MEDICINE

## 2021-10-16 PROCEDURE — 74011250637 HC RX REV CODE- 250/637: Performed by: NURSE PRACTITIONER

## 2021-10-16 PROCEDURE — 83735 ASSAY OF MAGNESIUM: CPT

## 2021-10-16 PROCEDURE — 80048 BASIC METABOLIC PNL TOTAL CA: CPT

## 2021-10-16 PROCEDURE — 74011250636 HC RX REV CODE- 250/636: Performed by: SURGERY

## 2021-10-16 PROCEDURE — 74011000258 HC RX REV CODE- 258: Performed by: SURGERY

## 2021-10-16 PROCEDURE — 74011250637 HC RX REV CODE- 250/637: Performed by: INTERNAL MEDICINE

## 2021-10-16 PROCEDURE — 74011000250 HC RX REV CODE- 250: Performed by: INTERNAL MEDICINE

## 2021-10-16 PROCEDURE — 74011250636 HC RX REV CODE- 250/636: Performed by: NURSE PRACTITIONER

## 2021-10-16 PROCEDURE — 36415 COLL VENOUS BLD VENIPUNCTURE: CPT

## 2021-10-16 PROCEDURE — C9113 INJ PANTOPRAZOLE SODIUM, VIA: HCPCS | Performed by: NURSE PRACTITIONER

## 2021-10-16 PROCEDURE — 84100 ASSAY OF PHOSPHORUS: CPT

## 2021-10-16 PROCEDURE — 65270000029 HC RM PRIVATE

## 2021-10-16 RX ORDER — MAGNESIUM SULFATE HEPTAHYDRATE 40 MG/ML
2 INJECTION, SOLUTION INTRAVENOUS
Status: COMPLETED | OUTPATIENT
Start: 2021-10-16 | End: 2021-10-16

## 2021-10-16 RX ORDER — HYDROMORPHONE HYDROCHLORIDE 1 MG/ML
1 INJECTION, SOLUTION INTRAMUSCULAR; INTRAVENOUS; SUBCUTANEOUS
Status: DISCONTINUED | OUTPATIENT
Start: 2021-10-16 | End: 2021-10-17

## 2021-10-16 RX ORDER — POTASSIUM CHLORIDE 7.45 MG/ML
10 INJECTION INTRAVENOUS
Status: COMPLETED | OUTPATIENT
Start: 2021-10-16 | End: 2021-10-16

## 2021-10-16 RX ORDER — MAGNESIUM SULFATE HEPTAHYDRATE 40 MG/ML
4 INJECTION, SOLUTION INTRAVENOUS ONCE
Status: DISCONTINUED | OUTPATIENT
Start: 2021-10-16 | End: 2021-10-16

## 2021-10-16 RX ADMIN — POTASSIUM CHLORIDE 10 MEQ: 7.46 INJECTION, SOLUTION INTRAVENOUS at 16:20

## 2021-10-16 RX ADMIN — Medication 3 MG: at 21:22

## 2021-10-16 RX ADMIN — PIPERACILLIN AND TAZOBACTAM 3.38 G: 3; .375 INJECTION, POWDER, LYOPHILIZED, FOR SOLUTION INTRAVENOUS at 10:48

## 2021-10-16 RX ADMIN — MAGNESIUM SULFATE HEPTAHYDRATE 2 G: 40 INJECTION, SOLUTION INTRAVENOUS at 13:15

## 2021-10-16 RX ADMIN — POTASSIUM & SODIUM PHOSPHATES POWDER PACK 280-160-250 MG 1 PACKET: 280-160-250 PACK at 05:37

## 2021-10-16 RX ADMIN — HYDROMORPHONE HYDROCHLORIDE 1 MG: 1 INJECTION, SOLUTION INTRAMUSCULAR; INTRAVENOUS; SUBCUTANEOUS at 21:22

## 2021-10-16 RX ADMIN — PIPERACILLIN AND TAZOBACTAM 3.38 G: 3; .375 INJECTION, POWDER, LYOPHILIZED, FOR SOLUTION INTRAVENOUS at 20:05

## 2021-10-16 RX ADMIN — POLYETHYLENE GLYCOL 3350 17 G: 17 POWDER, FOR SOLUTION ORAL at 17:05

## 2021-10-16 RX ADMIN — HYDROMORPHONE HYDROCHLORIDE 1 MG: 1 INJECTION, SOLUTION INTRAMUSCULAR; INTRAVENOUS; SUBCUTANEOUS at 17:05

## 2021-10-16 RX ADMIN — POTASSIUM CHLORIDE 10 MEQ: 7.46 INJECTION, SOLUTION INTRAVENOUS at 18:40

## 2021-10-16 RX ADMIN — HYDROMORPHONE HYDROCHLORIDE 0.4 MG: 1 INJECTION, SOLUTION INTRAMUSCULAR; INTRAVENOUS; SUBCUTANEOUS at 12:26

## 2021-10-16 RX ADMIN — MAGNESIUM SULFATE HEPTAHYDRATE 2 G: 40 INJECTION, SOLUTION INTRAVENOUS at 20:05

## 2021-10-16 RX ADMIN — SODIUM CHLORIDE 40 MG: 9 INJECTION INTRAMUSCULAR; INTRAVENOUS; SUBCUTANEOUS at 09:35

## 2021-10-16 RX ADMIN — OXYCODONE AND ACETAMINOPHEN 2 TABLET: 5; 325 TABLET ORAL at 09:47

## 2021-10-16 RX ADMIN — POTASSIUM CHLORIDE 10 MEQ: 7.46 INJECTION, SOLUTION INTRAVENOUS at 17:34

## 2021-10-16 RX ADMIN — PIPERACILLIN AND TAZOBACTAM 3.38 G: 3; .375 INJECTION, POWDER, LYOPHILIZED, FOR SOLUTION INTRAVENOUS at 02:35

## 2021-10-16 RX ADMIN — POTASSIUM CHLORIDE 10 MEQ: 7.46 INJECTION, SOLUTION INTRAVENOUS at 15:15

## 2021-10-16 RX ADMIN — POTASSIUM PHOSPHATE, MONOBASIC AND POTASSIUM PHOSPHATE, DIBASIC: 224; 236 INJECTION, SOLUTION, CONCENTRATE INTRAVENOUS at 21:41

## 2021-10-16 RX ADMIN — OXYCODONE AND ACETAMINOPHEN 2 TABLET: 5; 325 TABLET ORAL at 01:40

## 2021-10-16 RX ADMIN — POTASSIUM CHLORIDE, DEXTROSE MONOHYDRATE AND SODIUM CHLORIDE 125 ML/HR: 150; 5; 450 INJECTION, SOLUTION INTRAVENOUS at 10:15

## 2021-10-16 RX ADMIN — FLUOXETINE 40 MG: 20 CAPSULE ORAL at 09:35

## 2021-10-16 RX ADMIN — Medication 10 ML: at 21:22

## 2021-10-16 RX ADMIN — POTASSIUM CHLORIDE, DEXTROSE MONOHYDRATE AND SODIUM CHLORIDE 125 ML/HR: 150; 5; 450 INJECTION, SOLUTION INTRAVENOUS at 01:41

## 2021-10-16 RX ADMIN — ONDANSETRON HYDROCHLORIDE 4 MG: 2 INJECTION, SOLUTION INTRAMUSCULAR; INTRAVENOUS at 12:39

## 2021-10-16 NOTE — PROGRESS NOTES
[address] [facility]                                                                                          Hospitalist Progress Note  Vignesh Riggs MD  Answering service: [phone] from in house phone        Date of Service:  10/16/2021  NAME:  Mona Becker. :  1955  MRN:  [id number]      Admission Summary:   77year old with past medical history chronic pancreatitis, s/p choledochojejunostomy for biliary stricture. Here with sepsis and found to have multiloculated abdominal fluid collection.          Interval history / Subjective:   Patient resting comfortably  No overnight events     Assessment & Plan:     Pancreatic abscess s/p laparoscopic drainage 10/14  Mgmt per surgery, now primary    Hypophos, replete  Hypokalemia: replete  Hypomagnesemia: replete    Anemia: mgmt per primary    Depression: home fluoxetine     Severe biliary strictures, s/p choledochojejunostomy on 2016    Hyperglycemia, likely 2/2 steroid injection, a1c 6.1, preDM       Hospital Problems  Date Reviewed: 2020        Codes Class Noted POA    Severe protein-calorie malnutrition ([facility] [address] ICD-10-CM: J24  ICD-9-CM: 506  10/13/2021 Yes        * (Principal) Sepsis ([facility] [address] ICD-10-CM: A41.9  ICD-9-CM: 038.9, 995.91  10/10/2021 Unknown                Review of Systems:     Negative unless stated above     Vital Signs:    Last 24hrs VS reviewed since prior progress note.  Most recent are:  Visit Vitals  /76   Pulse 97   Temp 98 °F (36.7 °C)   Resp 16   Ht 6' (1.829 m)   Wt 73 kg (161 lb)   SpO2 97%   BMI 21.84 kg/m²         Intake/Output Summary (Last 24 hours) at 10/16/2021 1236  Last data filed at 10/16/2021 0133  Gross per 24 hour   Intake    Output 1190 ml   Net -1190 ml        Physical Examination:     I had a face to face encounter with this patient and independently examined them on 10/16/2021 as outlined below:          Constitutional:  No acute distress, cooperative, pleasant ENT:  Oral mucosa moist, oropharynx benign   Resp:  CTA bilaterally. No accessory muscle use   CV:  normal rate    GI:  Soft, non distended, appropriately tender    Musculoskeletal:  No edema, warm    Neurologic:  Moves all extremities            Data Review:    Review and/or order of clinical lab test  Review and/or order of tests in the radiology section of CPT  Review and/or order of tests in the medicine section of [place]      Labs:     Recent Labs     10/16/21  0232 10/15/21  0348   WBC 13.0* 15.3*   HGB 6.8* 7.4*   HCT 20.9* 22.1*   * 501*     Recent Labs     10/16/21  0232 10/15/21  0348 10/14/21  0524   * 135* 134*   K 3.2* 3.9 3.1*    102 100   CO2 28 27 25   BUN 8 10 9   CREA 0.68* 0.76 0.56*   * 220* 97   CA 7.7* 8.3* 8.4*   MG 1.6 1.8 1.7   PHOS 1.1* 1.8* 2.0*     No results for input(s): ALT, AP, TBIL, TBILI, TP, ALB, GLOB, GGT, AML, LPSE in the last 72 hours. No lab exists for component: SGOT, GPT, AMYP, HLPSE  No results for input(s): INR, PTP, APTT, INREXT, INREXT in the last 72 hours. No results for input(s): FE, TIBC, PSAT, FERR in the last 72 hours. No results found for: FOL, RBCF   No results for input(s): PH, PCO2, PO2 in the last 72 hours. No results for input(s): CPK, CKNDX, TROIQ in the last 72 hours.     No lab exists for component: CPKMB  No results found for: CHOL, CHOLX, CHLST, CHOLV, HDL, HDLP, LDL, LDLC, DLDLP, TGLX, TRIGL, TRIGP, CHHD, CHHDX  Lab Results   Component Value Date/Time    Glucose (POC) 77 10/14/2021 12:14 PM    Glucose (POC) 102 (H) 02/20/2016 06:17 AM    Glucose (POC) 136 (H) 02/19/2016 10:57 PM    Glucose (POC) 116 (H) 02/19/2016 04:44 PM    Glucose (POC) 132 (H) 02/19/2016 11:43 AM     Lab Results   Component Value Date/Time    Color DARK YELLOW 02/08/2016 08:22 PM    Appearance CLOUDY (A) 02/08/2016 08:22 PM    Specific gravity 1.026 02/08/2016 08:22 PM    pH (UA) 5.5 02/08/2016 08:22 PM    Protein 100 (A) 02/08/2016 08:22 PM    Glucose NEGATIVE  02/08/2016 08:22 PM    Ketone 40 (A) 02/08/2016 08:22 PM    Bilirubin LARGE (A) 08/16/2014 04:35 AM    Urobilinogen 1.0 02/08/2016 08:22 PM    Nitrites NEGATIVE  02/08/2016 08:22 PM    Leukocyte Esterase SMALL (A) 02/08/2016 08:22 PM    Epithelial cells FEW 02/08/2016 08:22 PM    Bacteria NEGATIVE  02/08/2016 08:22 PM    WBC 10-20 02/08/2016 08:22 PM    RBC 0-5 02/08/2016 08:22 PM         Medications Reviewed:     Current Facility-Administered Medications   Medication Dose Route Frequency    benzocaine-menthoL (CEPACOL) lozenge 1 Lozenge  1 Lozenge Mucous Membrane PRN    potassium, sodium phosphates (NEUTRA-PHOS) packet 1 Packet  1 Packet Oral Q8H    oxyCODONE-acetaminophen (PERCOCET) 5-325 mg per tablet 2 Tablet  2 Tablet Oral Q4H PRN    oxyCODONE-acetaminophen (PERCOCET) 5-325 mg per tablet 1 Tablet  1 Tablet Oral Q4H PRN    0.9% sodium chloride infusion 250 mL  250 mL IntraVENous PRN    sodium chloride (NS) flush 5-40 mL  5-40 mL IntraVENous Q8H    sodium chloride (NS) flush 5-40 mL  5-40 mL IntraVENous PRN    dextrose 5% - 0.45% NaCl with KCl 20 mEq/L infusion  125 mL/hr IntraVENous CONTINUOUS    FLUoxetine (PROzac) capsule 40 mg  40 mg Oral DAILY    pantoprazole (PROTONIX) 40 mg in 0.9% sodium chloride 10 mL injection  40 mg IntraVENous DAILY    piperacillin-tazobactam (ZOSYN) 3.375 g in 0.9% sodium chloride (MBP/ADV) 100 mL MBP  3.375 g IntraVENous Q8H    0.9% sodium chloride infusion  75 mL/hr IntraVENous CONTINUOUS    melatonin tablet 3 mg  3 mg Oral QHS    sodium chloride (NS) flush 5-40 mL  5-40 mL IntraVENous Q8H    sodium chloride (NS) flush 5-40 mL  5-40 mL IntraVENous PRN    acetaminophen (TYLENOL) tablet 650 mg  650 mg Oral Q6H PRN    Or    acetaminophen (TYLENOL) suppository 650 mg  650 mg Rectal Q6H PRN    polyethylene glycol (MIRALAX) packet 17 g  17 g Oral DAILY PRN    ondansetron (ZOFRAN ODT) tablet 4 mg  4 mg Oral Q8H PRN    Or    ondansetron (ZOFRAN) injection 4 mg 4 mg IntraVENous Q6H PRN    [Held by provider] enoxaparin (LOVENOX) injection 40 mg  40 mg SubCUTAneous DAILY    HYDROmorphone (DILAUDID) injection 0.4 mg  0.4 mg IntraVENous Q4H PRN     ______________________________________________________________________  EXPECTED LENGTH OF STAY: 9d 14h  ACTUAL LENGTH OF STAY:          6                Buckner Goodell, MD a

## 2021-10-16 NOTE — PROGRESS NOTES
Progress Note    Patient: Pernell Esteves. MRN: [id number]  SSN: [id number]    YOB: 1955  Age: 77 y.o. Sex: male      Admit Date: 10/10/2021    2 Days Post-Op    Procedure:  Procedure(s):  LAPAROSCOPIC POSSIBLE OPEN DRAINAGE OF PANCREATIC ABCESS    Subjective:     No acute surgical issues. Pt is doing okay. Felt a bit nauseated but no vomiting. WBC is trending down. Hemoglobin is 6.8 but he is not symptomatic. Pain is under control. Objective:     Visit Vitals  /78   Pulse 83   Temp 98.5 °F (36.9 °C)   Resp 16   Ht 6' (1.829 m)   Wt 161 lb (73 kg)   SpO2 98%   BMI 21.84 kg/m²       Temp (24hrs), Av °F (36.7 °C), Min:97.4 °F (36.3 °C), Max:98.5 °F (36.9 °C)        Physical Exam:    Gen:  NAD  Pulm:  Unlabored  Abd:  S/ND/appropriate TTP  Wound:  C/D/I  CLARKE with serosanguinous output    Recent Results (from the past 24 hour(s))   CBC WITH AUTOMATED DIFF    Collection Time: 10/16/21  2:32 AM   Result Value Ref Range    WBC 13.0 (H) 4.1 - 11.1 K/uL    RBC 2.43 (L) 4.10 - 5.70 M/uL    HGB 6.8 (L) 12.1 - 17.0 g/dL    HCT 20.9 (L) 36.6 - 50.3 %    MCV 86.0 80.0 - 99.0 FL    MCH 28.0 26.0 - 34.0 PG    MCHC 32.5 30.0 - 36.5 g/dL    RDW 14.5 11.5 - 14.5 %    PLATELET 704 (H) 511 - 400 K/uL    MPV 8.5 (L) 8.9 - 12.9 FL    NRBC 0.0 0  WBC    ABSOLUTE NRBC 0.00 0.00 - 0.01 K/uL    NEUTROPHILS 86 (H) 32 - 75 %    LYMPHOCYTES 6 (L) 12 - 49 %    MONOCYTES 6 5 - 13 %    EOSINOPHILS 1 0 - 7 %    BASOPHILS 0 0 - 1 %    IMMATURE GRANULOCYTES 1 (H) 0.0 - 0.5 %    ABS. NEUTROPHILS 11.2 (H) 1.8 - 8.0 K/UL    ABS. LYMPHOCYTES 0.8 0.8 - 3.5 K/UL    ABS. MONOCYTES 0.8 0.0 - 1.0 K/UL    ABS. EOSINOPHILS 0.1 0.0 - 0.4 K/UL    ABS. BASOPHILS 0.0 0.0 - 0.1 K/UL    ABS. IMM.  GRANS. 0.1 (H) 0.00 - 0.04 K/UL    DF SMEAR SCANNED      RBC COMMENTS ANISOCYTOSIS  1+        RBC COMMENTS POLYCHROMASIA  1+        RBC COMMENTS TARGET CELLS  PRESENT       METABOLIC PANEL, BASIC    Collection Time: 10/16/21  2:32 AM   Result Value Ref Range    Sodium 134 (L) 136 - 145 mmol/L    Potassium 3.2 (L) 3.5 - 5.1 mmol/L    Chloride 101 97 - 108 mmol/L    CO2 28 21 - 32 mmol/L    Anion gap 5 5 - 15 mmol/L    Glucose 152 (H) 65 - 100 mg/dL    BUN 8 6 - 20 MG/DL    Creatinine 0.68 (L) 0.70 - 1.30 MG/DL    BUN/Creatinine ratio 12 12 - 20      GFR est AA >60 >60 ml/min/1.73m2    GFR est non-AA >60 >60 ml/min/1.73m2    Calcium 7.7 (L) 8.5 - 10.1 MG/DL   MAGNESIUM    Collection Time: 10/16/21  2:32 AM   Result Value Ref Range    Magnesium 1.6 1.6 - 2.4 mg/dL   PHOSPHORUS    Collection Time: 10/16/21  2:32 AM   Result Value Ref Range    Phosphorus 1.1 (L) 2.6 - 4.7 MG/DL         Assessment:     Hospital Problems  Date Reviewed: 11/11/2020        Codes Class Noted POA    Severe protein-calorie malnutrition ([facility] [address] ICD-10-CM: S69  ICD-9-CM: 575  10/13/2021 Yes        * (Principal) Sepsis ([facility] [address] ICD-10-CM: A41.9  ICD-9-CM: 038.9, 995.91  10/10/2021 Unknown              Plan/Recommendations/Medical Decision Making:     - Keep on clear liquids for today  - Anemia:  Appears to be chronic and currently asymptomatic. Will hold off transfusion for today.   - Out of bed as tolerated  - Labs in am

## 2021-10-17 LAB
ANION GAP SERPL CALC-SCNC: 2 MMOL/L (ref 5–15)
ANION GAP SERPL CALC-SCNC: 6 MMOL/L (ref 5–15)
BACTERIA SPEC CULT: NORMAL
BASOPHILS # BLD: 0 K/UL (ref 0–0.1)
BASOPHILS NFR BLD: 0 % (ref 0–1)
BUN SERPL-MCNC: 6 MG/DL (ref 6–20)
BUN SERPL-MCNC: 7 MG/DL (ref 6–20)
BUN/CREAT SERPL: 10 (ref 12–20)
BUN/CREAT SERPL: 9 (ref 12–20)
CALCIUM SERPL-MCNC: 7.5 MG/DL (ref 8.5–10.1)
CALCIUM SERPL-MCNC: 7.6 MG/DL (ref 8.5–10.1)
CHLORIDE SERPL-SCNC: 98 MMOL/L (ref 97–108)
CHLORIDE SERPL-SCNC: 99 MMOL/L (ref 97–108)
CO2 SERPL-SCNC: 26 MMOL/L (ref 21–32)
CO2 SERPL-SCNC: 31 MMOL/L (ref 21–32)
CREAT SERPL-MCNC: 0.59 MG/DL (ref 0.7–1.3)
CREAT SERPL-MCNC: 0.74 MG/DL (ref 0.7–1.3)
DIFFERENTIAL METHOD BLD: ABNORMAL
EOSINOPHIL # BLD: 0.2 K/UL (ref 0–0.4)
EOSINOPHIL NFR BLD: 1 % (ref 0–7)
ERYTHROCYTE [DISTWIDTH] IN BLOOD BY AUTOMATED COUNT: 15.6 % (ref 11.5–14.5)
GLUCOSE SERPL-MCNC: 101 MG/DL (ref 65–100)
GLUCOSE SERPL-MCNC: 157 MG/DL (ref 65–100)
HCT VFR BLD AUTO: 26.7 % (ref 36.6–50.3)
HGB BLD-MCNC: 8.6 G/DL (ref 12.1–17)
IMM GRANULOCYTES # BLD AUTO: 0.2 K/UL (ref 0–0.04)
IMM GRANULOCYTES NFR BLD AUTO: 1 % (ref 0–0.5)
LIPASE SERPL-CCNC: 22 U/L (ref 73–393)
LYMPHOCYTES # BLD: 1.3 K/UL (ref 0.8–3.5)
LYMPHOCYTES NFR BLD: 8 % (ref 12–49)
MAGNESIUM SERPL-MCNC: 2.1 MG/DL (ref 1.6–2.4)
MCH RBC QN AUTO: 28.3 PG (ref 26–34)
MCHC RBC AUTO-ENTMCNC: 32.2 G/DL (ref 30–36.5)
MCV RBC AUTO: 87.8 FL (ref 80–99)
MONOCYTES # BLD: 1 K/UL (ref 0–1)
MONOCYTES NFR BLD: 6 % (ref 5–13)
NEUTS SEG # BLD: 14.5 K/UL (ref 1.8–8)
NEUTS SEG NFR BLD: 84 % (ref 32–75)
NRBC # BLD: 0 K/UL (ref 0–0.01)
NRBC BLD-RTO: 0 PER 100 WBC
PHOSPHATE SERPL-MCNC: 1.8 MG/DL (ref 2.6–4.7)
PHOSPHATE SERPL-MCNC: 7.2 MG/DL (ref 2.6–4.7)
PLATELET # BLD AUTO: 507 K/UL (ref 150–400)
PMV BLD AUTO: 9.1 FL (ref 8.9–12.9)
POTASSIUM SERPL-SCNC: 4.5 MMOL/L (ref 3.5–5.1)
POTASSIUM SERPL-SCNC: ABNORMAL MMOL/L (ref 3.5–5.1)
RBC # BLD AUTO: 3.04 M/UL (ref 4.1–5.7)
SERVICE CMNT-IMP: NORMAL
SODIUM SERPL-SCNC: 131 MMOL/L (ref 136–145)
SODIUM SERPL-SCNC: 131 MMOL/L (ref 136–145)
WBC # BLD AUTO: 17.2 K/UL (ref 4.1–11.1)

## 2021-10-17 PROCEDURE — 80048 BASIC METABOLIC PNL TOTAL CA: CPT

## 2021-10-17 PROCEDURE — 85025 COMPLETE CBC W/AUTO DIFF WBC: CPT

## 2021-10-17 PROCEDURE — 74011250636 HC RX REV CODE- 250/636: Performed by: INTERNAL MEDICINE

## 2021-10-17 PROCEDURE — 74011250636 HC RX REV CODE- 250/636: Performed by: NURSE PRACTITIONER

## 2021-10-17 PROCEDURE — 74011250637 HC RX REV CODE- 250/637: Performed by: INTERNAL MEDICINE

## 2021-10-17 PROCEDURE — 74011250636 HC RX REV CODE- 250/636: Performed by: SURGERY

## 2021-10-17 PROCEDURE — 94760 N-INVAS EAR/PLS OXIMETRY 1: CPT

## 2021-10-17 PROCEDURE — 83690 ASSAY OF LIPASE: CPT

## 2021-10-17 PROCEDURE — 74011000250 HC RX REV CODE- 250: Performed by: NURSE PRACTITIONER

## 2021-10-17 PROCEDURE — 84100 ASSAY OF PHOSPHORUS: CPT

## 2021-10-17 PROCEDURE — 65270000029 HC RM PRIVATE

## 2021-10-17 PROCEDURE — 74011000250 HC RX REV CODE- 250: Performed by: INTERNAL MEDICINE

## 2021-10-17 PROCEDURE — 74011000258 HC RX REV CODE- 258: Performed by: SURGERY

## 2021-10-17 PROCEDURE — C9113 INJ PANTOPRAZOLE SODIUM, VIA: HCPCS | Performed by: NURSE PRACTITIONER

## 2021-10-17 PROCEDURE — 83735 ASSAY OF MAGNESIUM: CPT

## 2021-10-17 PROCEDURE — 36415 COLL VENOUS BLD VENIPUNCTURE: CPT

## 2021-10-17 RX ORDER — DIPHENHYDRAMINE HYDROCHLORIDE 50 MG/ML
12.5 INJECTION, SOLUTION INTRAMUSCULAR; INTRAVENOUS
Status: DISCONTINUED | OUTPATIENT
Start: 2021-10-17 | End: 2021-10-27 | Stop reason: HOSPADM

## 2021-10-17 RX ORDER — MORPHINE SULFATE 2 MG/ML
4 INJECTION, SOLUTION INTRAMUSCULAR; INTRAVENOUS
Status: DISCONTINUED | OUTPATIENT
Start: 2021-10-17 | End: 2021-10-18

## 2021-10-17 RX ADMIN — HYDROMORPHONE HYDROCHLORIDE 1 MG: 1 INJECTION, SOLUTION INTRAMUSCULAR; INTRAVENOUS; SUBCUTANEOUS at 17:11

## 2021-10-17 RX ADMIN — MORPHINE SULFATE 4 MG: 2 INJECTION, SOLUTION INTRAMUSCULAR; INTRAVENOUS at 21:26

## 2021-10-17 RX ADMIN — PIPERACILLIN AND TAZOBACTAM 3.38 G: 3; .375 INJECTION, POWDER, LYOPHILIZED, FOR SOLUTION INTRAVENOUS at 03:09

## 2021-10-17 RX ADMIN — HYDROMORPHONE HYDROCHLORIDE 1 MG: 1 INJECTION, SOLUTION INTRAMUSCULAR; INTRAVENOUS; SUBCUTANEOUS at 06:57

## 2021-10-17 RX ADMIN — SODIUM CHLORIDE 40 MG: 9 INJECTION INTRAMUSCULAR; INTRAVENOUS; SUBCUTANEOUS at 10:39

## 2021-10-17 RX ADMIN — PIPERACILLIN AND TAZOBACTAM 3.38 G: 3; .375 INJECTION, POWDER, LYOPHILIZED, FOR SOLUTION INTRAVENOUS at 18:27

## 2021-10-17 RX ADMIN — FLUOXETINE 40 MG: 20 CAPSULE ORAL at 10:39

## 2021-10-17 RX ADMIN — PIPERACILLIN AND TAZOBACTAM 3.38 G: 3; .375 INJECTION, POWDER, LYOPHILIZED, FOR SOLUTION INTRAVENOUS at 10:43

## 2021-10-17 RX ADMIN — Medication 10 ML: at 21:29

## 2021-10-17 RX ADMIN — POTASSIUM CHLORIDE, DEXTROSE MONOHYDRATE AND SODIUM CHLORIDE 125 ML/HR: 150; 5; 450 INJECTION, SOLUTION INTRAVENOUS at 06:54

## 2021-10-17 RX ADMIN — Medication 3 MG: at 21:26

## 2021-10-17 RX ADMIN — SODIUM PHOSPHATE, MONOBASIC, MONOHYDRATE: 276; 142 INJECTION, SOLUTION INTRAVENOUS at 21:27

## 2021-10-17 RX ADMIN — HYDROMORPHONE HYDROCHLORIDE 1 MG: 1 INJECTION, SOLUTION INTRAMUSCULAR; INTRAVENOUS; SUBCUTANEOUS at 12:58

## 2021-10-17 NOTE — PROGRESS NOTES
[address] [facility] Adult  Hospitalist Group                                                                                          Hospitalist Progress Note  Javid Wu MD  Answering service: [phone] from in house phone        Date of Service:  10/17/2021  NAME:  Joycelyn Muniz. :  1955  MRN:  [id number]      Admission Summary:   77year old with past medical history chronic pancreatitis, s/p choledochojejunostomy for biliary stricture. Here with sepsis and found to have multiloculated abdominal fluid collection.          Interval history / Subjective:   Patient resting comfortably  No overnight events     Assessment & Plan:     Pancreatic abscess s/p laparoscopic drainage 10/14  Mgmt per surgery, now primary    Hypophos, repleted (labs hemolyzed this AM will repeat)  Hypokalemia: repleted  Hypomagnesemia: repleted    Anemia: improved/stable, mgmt per primary    Depression: home fluoxetine     Severe biliary strictures, s/p choledochojejunostomy on 2016    Hyperglycemia, likely 2/2 steroid injection, a1c 6.1, preD      Hospital Medicine will sign off, please let us know if we can be of further assistance       Hospital Problems  Date Reviewed: 2020        Codes Class Noted POA    Severe protein-calorie malnutrition ([facility] [address] ICD-10-CM: M47  ICD-9-CM: 373  10/13/2021 Yes        * (Principal) Sepsis ([facility] [address] ICD-10-CM: A41.9  ICD-9-CM: 038.9, 995.91  10/10/2021 Unknown                Review of Systems:     Negative unless stated above     Vital Signs:    Last 24hrs VS reviewed since prior progress note.  Most recent are:  Visit Vitals  /69   Pulse 80   Temp 97.8 °F (36.6 °C)   Resp 16   Ht 6' (1.829 m)   Wt 73 kg (161 lb)   SpO2 97%   BMI 21.84 kg/m²         Intake/Output Summary (Last 24 hours) at 10/17/2021 1112  Last data filed at 10/16/2021 2115  Gross per 24 hour   Intake    Output 650 ml   Net -650 ml        Physical Examination:     I had a face to face encounter with this patient and independently examined them on 10/17/2021 as outlined below:          Constitutional:  No acute distress   ENT:  Oral mucosa moist, oropharynx benign   Resp:  CTA bilaterally. No accessory muscle use   CV:  normal rate    GI:  Soft, non distended, appropriately tender    Musculoskeletal:  No edema, warm    Neurologic:  Moves all extremities            Data Review:    Review and/or order of clinical lab test  Review and/or order of tests in the radiology section of CPT  Review and/or order of tests in the medicine section of CPT      Labs:     Recent Labs     10/17/21  0257 10/16/21  0232   WBC 17.2* 13.0*   HGB 8.6* 6.8*   HCT 26.7* 20.9*   * 452*     Recent Labs     10/17/21  0257 10/16/21  0232 10/15/21  0348   * 134* 135*   K HEMOLYZED,RECOLLECT REQUESTED 3.2* 3.9   CL 99 101 102   CO2 26 28 27   BUN 6 8 10   CREA 0.59* 0.68* 0.76   * 152* 220*   CA 7.6* 7.7* 8.3*   MG 2.1 1.6 1.8   PHOS 7.2* 1.1* 1.8*     Recent Labs     10/17/21  0257   LPSE 22*     No results for input(s): INR, PTP, APTT, INREXT, INREXT in the last 72 hours. No results for input(s): FE, TIBC, PSAT, FERR in the last 72 hours. No results found for: FOL, RBCF   No results for input(s): PH, PCO2, PO2 in the last 72 hours. No results for input(s): CPK, CKNDX, TROIQ in the last 72 hours.     No lab exists for component: CPKMB  No results found for: CHOL, CHOLX, CHLST, CHOLV, HDL, HDLP, LDL, LDLC, DLDLP, TGLX, TRIGL, TRIGP, CHHD, CHHDX  Lab Results   Component Value Date/Time    Glucose (POC) 77 10/14/2021 12:14 PM    Glucose (POC) 102 (H) 02/20/2016 06:17 AM    Glucose (POC) 136 (H) 02/19/2016 10:57 PM    Glucose (POC) 116 (H) 02/19/2016 04:44 PM    Glucose (POC) 132 (H) 02/19/2016 11:43 AM     Lab Results   Component Value Date/Time    Color DARK YELLOW 02/08/2016 08:22 PM    Appearance CLOUDY (A) 02/08/2016 08:22 PM    Specific gravity 1.026 02/08/2016 08:22 PM    pH (UA) 5.5 02/08/2016 08:22 PM Protein 100 (A) 02/08/2016 08:22 PM    Glucose NEGATIVE  02/08/2016 08:22 PM    Ketone 40 (A) 02/08/2016 08:22 PM    Bilirubin LARGE (A) 08/16/2014 04:35 AM    Urobilinogen 1.0 02/08/2016 08:22 PM    Nitrites NEGATIVE  02/08/2016 08:22 PM    Leukocyte Esterase SMALL (A) 02/08/2016 08:22 PM    Epithelial cells FEW 02/08/2016 08:22 PM    Bacteria NEGATIVE  02/08/2016 08:22 PM    WBC 10-20 02/08/2016 08:22 PM    RBC 0-5 02/08/2016 08:22 PM         Medications Reviewed:     Current Facility-Administered Medications   Medication Dose Route Frequency    HYDROmorphone (DILAUDID) injection 1 mg  1 mg IntraVENous Q4H PRN    benzocaine-menthoL (CEPACOL) lozenge 1 Lozenge  1 Lozenge Mucous Membrane PRN    oxyCODONE-acetaminophen (PERCOCET) 5-325 mg per tablet 2 Tablet  2 Tablet Oral Q4H PRN    oxyCODONE-acetaminophen (PERCOCET) 5-325 mg per tablet 1 Tablet  1 Tablet Oral Q4H PRN    0.9% sodium chloride infusion 250 mL  250 mL IntraVENous PRN    sodium chloride (NS) flush 5-40 mL  5-40 mL IntraVENous Q8H    sodium chloride (NS) flush 5-40 mL  5-40 mL IntraVENous PRN    dextrose 5% - 0.45% NaCl with KCl 20 mEq/L infusion  125 mL/hr IntraVENous CONTINUOUS    FLUoxetine (PROzac) capsule 40 mg  40 mg Oral DAILY    pantoprazole (PROTONIX) 40 mg in 0.9% sodium chloride 10 mL injection  40 mg IntraVENous DAILY    piperacillin-tazobactam (ZOSYN) 3.375 g in 0.9% sodium chloride (MBP/ADV) 100 mL MBP  3.375 g IntraVENous Q8H    0.9% sodium chloride infusion  75 mL/hr IntraVENous CONTINUOUS    melatonin tablet 3 mg  3 mg Oral QHS    sodium chloride (NS) flush 5-40 mL  5-40 mL IntraVENous Q8H    sodium chloride (NS) flush 5-40 mL  5-40 mL IntraVENous PRN    acetaminophen (TYLENOL) tablet 650 mg  650 mg Oral Q6H PRN    Or    acetaminophen (TYLENOL) suppository 650 mg  650 mg Rectal Q6H PRN    polyethylene glycol (MIRALAX) packet 17 g  17 g Oral DAILY PRN    ondansetron (ZOFRAN ODT) tablet 4 mg  4 mg Oral Q8H PRN    Or    ondansetron (ZOFRAN) injection 4 mg  4 mg IntraVENous Q6H PRN    [Held by provider] enoxaparin (LOVENOX) injection 40 mg  40 mg SubCUTAneous DAILY     ______________________________________________________________________  EXPECTED LENGTH OF STAY: 9d 14h  ACTUAL LENGTH OF STAY:          7                Daquan Stevens MD a

## 2021-10-18 LAB
ANION GAP SERPL CALC-SCNC: 7 MMOL/L (ref 5–15)
ATRIAL RATE: 90 BPM
BACTERIA SPEC CULT: ABNORMAL
BACTERIA SPEC CULT: ABNORMAL
BASOPHILS # BLD: 0 K/UL (ref 0–0.1)
BASOPHILS NFR BLD: 0 % (ref 0–1)
BUN SERPL-MCNC: 6 MG/DL (ref 6–20)
BUN/CREAT SERPL: 9 (ref 12–20)
CALCIUM SERPL-MCNC: 7.7 MG/DL (ref 8.5–10.1)
CALCULATED P AXIS, ECG09: 44 DEGREES
CALCULATED R AXIS, ECG10: 43 DEGREES
CALCULATED T AXIS, ECG11: 24 DEGREES
CHLORIDE SERPL-SCNC: 100 MMOL/L (ref 97–108)
CO2 SERPL-SCNC: 24 MMOL/L (ref 21–32)
CREAT SERPL-MCNC: 0.64 MG/DL (ref 0.7–1.3)
DIAGNOSIS, 93000: NORMAL
DIFFERENTIAL METHOD BLD: ABNORMAL
EOSINOPHIL # BLD: 0.1 K/UL (ref 0–0.4)
EOSINOPHIL NFR BLD: 1 % (ref 0–7)
ERYTHROCYTE [DISTWIDTH] IN BLOOD BY AUTOMATED COUNT: 15.1 % (ref 11.5–14.5)
GLUCOSE SERPL-MCNC: 131 MG/DL (ref 65–100)
GRAM STN SPEC: ABNORMAL
GRAM STN SPEC: ABNORMAL
HCT VFR BLD AUTO: 25 % (ref 36.6–50.3)
HGB BLD-MCNC: 8 G/DL (ref 12.1–17)
IMM GRANULOCYTES # BLD AUTO: 0.1 K/UL (ref 0–0.04)
IMM GRANULOCYTES NFR BLD AUTO: 1 % (ref 0–0.5)
LYMPHOCYTES # BLD: 0.8 K/UL (ref 0.8–3.5)
LYMPHOCYTES NFR BLD: 7 % (ref 12–49)
MCH RBC QN AUTO: 28.1 PG (ref 26–34)
MCHC RBC AUTO-ENTMCNC: 32 G/DL (ref 30–36.5)
MCV RBC AUTO: 87.7 FL (ref 80–99)
MONOCYTES # BLD: 0.8 K/UL (ref 0–1)
MONOCYTES NFR BLD: 6 % (ref 5–13)
NEUTS SEG # BLD: 10.3 K/UL (ref 1.8–8)
NEUTS SEG NFR BLD: 85 % (ref 32–75)
NRBC # BLD: 0 K/UL (ref 0–0.01)
NRBC BLD-RTO: 0 PER 100 WBC
P-R INTERVAL, ECG05: 136 MS
PHOSPHATE SERPL-MCNC: 3.6 MG/DL (ref 2.6–4.7)
PLATELET # BLD AUTO: 468 K/UL (ref 150–400)
PMV BLD AUTO: 8.3 FL (ref 8.9–12.9)
POTASSIUM SERPL-SCNC: 4.2 MMOL/L (ref 3.5–5.1)
Q-T INTERVAL, ECG07: 382 MS
QRS DURATION, ECG06: 90 MS
QTC CALCULATION (BEZET), ECG08: 467 MS
RBC # BLD AUTO: 2.85 M/UL (ref 4.1–5.7)
SERVICE CMNT-IMP: ABNORMAL
SODIUM SERPL-SCNC: 131 MMOL/L (ref 136–145)
VENTRICULAR RATE, ECG03: 90 BPM
VIT A SERPL-MCNC: 4.4 UG/DL (ref 22–69.5)
VITAMIN E/ALPHA-TOCOPHEROL: 6.3 MG/L (ref 9–29)
VITAMIN E/GAMMA-TOCOPHEROL: 0.8 MG/L (ref 0.5–4.9)
WBC # BLD AUTO: 12.2 K/UL (ref 4.1–11.1)

## 2021-10-18 PROCEDURE — 74011250637 HC RX REV CODE- 250/637: Performed by: NURSE PRACTITIONER

## 2021-10-18 PROCEDURE — 74011250636 HC RX REV CODE- 250/636: Performed by: SURGERY

## 2021-10-18 PROCEDURE — 65270000029 HC RM PRIVATE

## 2021-10-18 PROCEDURE — 74011250636 HC RX REV CODE- 250/636: Performed by: NURSE PRACTITIONER

## 2021-10-18 PROCEDURE — 74011000258 HC RX REV CODE- 258: Performed by: SURGERY

## 2021-10-18 PROCEDURE — 74011250637 HC RX REV CODE- 250/637: Performed by: PHYSICIAN ASSISTANT

## 2021-10-18 PROCEDURE — 80048 BASIC METABOLIC PNL TOTAL CA: CPT

## 2021-10-18 PROCEDURE — 84100 ASSAY OF PHOSPHORUS: CPT

## 2021-10-18 PROCEDURE — 99024 POSTOP FOLLOW-UP VISIT: CPT | Performed by: PHYSICIAN ASSISTANT

## 2021-10-18 PROCEDURE — 74011000250 HC RX REV CODE- 250: Performed by: NURSE PRACTITIONER

## 2021-10-18 PROCEDURE — 93005 ELECTROCARDIOGRAM TRACING: CPT

## 2021-10-18 PROCEDURE — 83735 ASSAY OF MAGNESIUM: CPT

## 2021-10-18 PROCEDURE — 74011250636 HC RX REV CODE- 250/636: Performed by: INTERNAL MEDICINE

## 2021-10-18 PROCEDURE — 36415 COLL VENOUS BLD VENIPUNCTURE: CPT

## 2021-10-18 PROCEDURE — C9113 INJ PANTOPRAZOLE SODIUM, VIA: HCPCS | Performed by: NURSE PRACTITIONER

## 2021-10-18 PROCEDURE — 74011250637 HC RX REV CODE- 250/637: Performed by: INTERNAL MEDICINE

## 2021-10-18 PROCEDURE — 85025 COMPLETE CBC W/AUTO DIFF WBC: CPT

## 2021-10-18 RX ORDER — SCOLOPAMINE TRANSDERMAL SYSTEM 1 MG/1
1 PATCH, EXTENDED RELEASE TRANSDERMAL
Status: DISCONTINUED | OUTPATIENT
Start: 2021-10-18 | End: 2021-10-22

## 2021-10-18 RX ORDER — HYDROMORPHONE HYDROCHLORIDE 1 MG/ML
1 INJECTION, SOLUTION INTRAMUSCULAR; INTRAVENOUS; SUBCUTANEOUS
Status: DISCONTINUED | OUTPATIENT
Start: 2021-10-18 | End: 2021-10-27 | Stop reason: HOSPADM

## 2021-10-18 RX ORDER — HYDROMORPHONE HYDROCHLORIDE 2 MG/1
2 TABLET ORAL
Status: DISCONTINUED | OUTPATIENT
Start: 2021-10-18 | End: 2021-10-27 | Stop reason: HOSPADM

## 2021-10-18 RX ADMIN — MORPHINE SULFATE 4 MG: 2 INJECTION, SOLUTION INTRAMUSCULAR; INTRAVENOUS at 09:06

## 2021-10-18 RX ADMIN — OXYCODONE AND ACETAMINOPHEN 2 TABLET: 5; 325 TABLET ORAL at 00:57

## 2021-10-18 RX ADMIN — ONDANSETRON HYDROCHLORIDE 4 MG: 2 INJECTION, SOLUTION INTRAMUSCULAR; INTRAVENOUS at 09:06

## 2021-10-18 RX ADMIN — POTASSIUM CHLORIDE, DEXTROSE MONOHYDRATE AND SODIUM CHLORIDE 125 ML/HR: 150; 5; 450 INJECTION, SOLUTION INTRAVENOUS at 01:03

## 2021-10-18 RX ADMIN — ONDANSETRON HYDROCHLORIDE 4 MG: 2 INJECTION, SOLUTION INTRAMUSCULAR; INTRAVENOUS at 15:58

## 2021-10-18 RX ADMIN — SODIUM CHLORIDE 40 MG: 9 INJECTION INTRAMUSCULAR; INTRAVENOUS; SUBCUTANEOUS at 09:30

## 2021-10-18 RX ADMIN — MORPHINE SULFATE 4 MG: 2 INJECTION, SOLUTION INTRAMUSCULAR; INTRAVENOUS at 15:58

## 2021-10-18 RX ADMIN — PIPERACILLIN AND TAZOBACTAM 3.38 G: 3; .375 INJECTION, POWDER, LYOPHILIZED, FOR SOLUTION INTRAVENOUS at 02:28

## 2021-10-18 RX ADMIN — Medication 10 ML: at 06:23

## 2021-10-18 RX ADMIN — DIPHENHYDRAMINE HYDROCHLORIDE 12.5 MG: 50 INJECTION, SOLUTION INTRAMUSCULAR; INTRAVENOUS at 23:11

## 2021-10-18 RX ADMIN — MORPHINE SULFATE 4 MG: 2 INJECTION, SOLUTION INTRAMUSCULAR; INTRAVENOUS at 12:14

## 2021-10-18 RX ADMIN — PIPERACILLIN AND TAZOBACTAM 3.38 G: 3; .375 INJECTION, POWDER, LYOPHILIZED, FOR SOLUTION INTRAVENOUS at 18:26

## 2021-10-18 RX ADMIN — ONDANSETRON HYDROCHLORIDE 4 MG: 2 INJECTION, SOLUTION INTRAMUSCULAR; INTRAVENOUS at 00:53

## 2021-10-18 RX ADMIN — HYDROMORPHONE HYDROCHLORIDE 1 MG: 1 INJECTION, SOLUTION INTRAMUSCULAR; INTRAVENOUS; SUBCUTANEOUS at 23:12

## 2021-10-18 RX ADMIN — HYDROMORPHONE HYDROCHLORIDE 1 MG: 1 INJECTION, SOLUTION INTRAMUSCULAR; INTRAVENOUS; SUBCUTANEOUS at 18:33

## 2021-10-18 RX ADMIN — POTASSIUM CHLORIDE, DEXTROSE MONOHYDRATE AND SODIUM CHLORIDE 125 ML/HR: 150; 5; 450 INJECTION, SOLUTION INTRAVENOUS at 12:03

## 2021-10-18 RX ADMIN — FLUOXETINE 40 MG: 20 CAPSULE ORAL at 09:30

## 2021-10-18 RX ADMIN — PIPERACILLIN AND TAZOBACTAM 3.38 G: 3; .375 INJECTION, POWDER, LYOPHILIZED, FOR SOLUTION INTRAVENOUS at 12:02

## 2021-10-18 NOTE — PROGRESS NOTES
Progress Note    Patient: Monse Cade. MRN: [id number]  SSN: [id number]    YOB: 1955  Age: 77 y.o. Sex: male      Admit Date: 10/10/2021    3 Days Post-Op    Procedure:  Procedure(s):  LAPAROSCOPIC POSSIBLE OPEN DRAINAGE OF PANCREATIC ABCESS    Subjective:     No acute surgical issues. Pt reported significant crampy pain to his left abdomen and didn't feel he was getting pain medication on time. No nausea or vomiting. WBC is trending up today. Hemoglobin is stable. Objective:     Visit Vitals  /78   Pulse 94   Temp 98.6 °F (37 °C)   Resp 16   Ht 6' (1.829 m)   Wt 161 lb (73 kg)   SpO2 97%   BMI 21.84 kg/m²       Temp (24hrs), Av.1 °F (36.7 °C), Min:97.5 °F (36.4 °C), Max:98.6 °F (37 °C)        Physical Exam:    Gen:  NAD  Pulm:  Unlabored  Abd:  S/ND/appropriate TTP  Wound:  C/D/I  CLARKE with serosanguinous output    Recent Results (from the past 24 hour(s))   CBC WITH AUTOMATED DIFF    Collection Time: 10/17/21  2:57 AM   Result Value Ref Range    WBC 17.2 (H) 4.1 - 11.1 K/uL    RBC 3.04 (L) 4.10 - 5.70 M/uL    HGB 8.6 (L) 12.1 - 17.0 g/dL    HCT 26.7 (L) 36.6 - 50.3 %    MCV 87.8 80.0 - 99.0 FL    MCH 28.3 26.0 - 34.0 PG    MCHC 32.2 30.0 - 36.5 g/dL    RDW 15.6 (H) 11.5 - 14.5 %    PLATELET 560 (H) 742 - 400 K/uL    MPV 9.1 8.9 - 12.9 FL    NRBC 0.0 0  WBC    ABSOLUTE NRBC 0.00 0.00 - 0.01 K/uL    NEUTROPHILS 84 (H) 32 - 75 %    LYMPHOCYTES 8 (L) 12 - 49 %    MONOCYTES 6 5 - 13 %    EOSINOPHILS 1 0 - 7 %    BASOPHILS 0 0 - 1 %    IMMATURE GRANULOCYTES 1 (H) 0.0 - 0.5 %    ABS. NEUTROPHILS 14.5 (H) 1.8 - 8.0 K/UL    ABS. LYMPHOCYTES 1.3 0.8 - 3.5 K/UL    ABS. MONOCYTES 1.0 0.0 - 1.0 K/UL    ABS. EOSINOPHILS 0.2 0.0 - 0.4 K/UL    ABS. BASOPHILS 0.0 0.0 - 0.1 K/UL    ABS. IMM.  GRANS. 0.2 (H) 0.00 - 0.04 K/UL    DF AUTOMATED     METABOLIC PANEL, BASIC    Collection Time: 10/17/21  2:57 AM   Result Value Ref Range    Sodium 131 (L) 136 - 145 mmol/L    Potassium HEMOLYZED,RECOLLECT REQUESTED 3.5 - 5.1 mmol/L    Chloride 99 97 - 108 mmol/L    CO2 26 21 - 32 mmol/L    Anion gap 6 5 - 15 mmol/L    Glucose 101 (H) 65 - 100 mg/dL    BUN 6 6 - 20 MG/DL    Creatinine 0.59 (L) 0.70 - 1.30 MG/DL    BUN/Creatinine ratio 10 (L) 12 - 20      GFR est AA >60 >60 ml/min/1.73m2    GFR est non-AA >60 >60 ml/min/1.73m2    Calcium 7.6 (L) 8.5 - 10.1 MG/DL   LIPASE    Collection Time: 10/17/21  2:57 AM   Result Value Ref Range    Lipase 22 (L) 73 - 393 U/L   MAGNESIUM    Collection Time: 10/17/21  2:57 AM   Result Value Ref Range    Magnesium 2.1 1.6 - 2.4 mg/dL   PHOSPHORUS    Collection Time: 10/17/21  2:57 AM   Result Value Ref Range    Phosphorus 7.2 (H) 2.6 - 4.7 MG/DL   METABOLIC PANEL, BASIC    Collection Time: 10/17/21  5:24 PM   Result Value Ref Range    Sodium 131 (L) 136 - 145 mmol/L    Potassium 4.5 3.5 - 5.1 mmol/L    Chloride 98 97 - 108 mmol/L    CO2 31 21 - 32 mmol/L    Anion gap 2 (L) 5 - 15 mmol/L    Glucose 157 (H) 65 - 100 mg/dL    BUN 7 6 - 20 MG/DL    Creatinine 0.74 0.70 - 1.30 MG/DL    BUN/Creatinine ratio 9 (L) 12 - 20      GFR est AA >60 >60 ml/min/1.73m2    GFR est non-AA >60 >60 ml/min/1.73m2    Calcium 7.5 (L) 8.5 - 10.1 MG/DL   PHOSPHORUS    Collection Time: 10/17/21  5:24 PM   Result Value Ref Range    Phosphorus 1.8 (L) 2.6 - 4.7 MG/DL         Assessment:     Hospital Problems  Date Reviewed: 11/11/2020        Codes Class Noted POA    Severe protein-calorie malnutrition ([facility] ICD-10-CM: U01  ICD-9-CM: 634  10/13/2021 Yes        * (Principal) Sepsis ([facility] ICD-10-CM: A41.9  ICD-9-CM: 038.9, 995.91  10/10/2021 Unknown              Plan/Recommendations/Medical Decision Making:     - Full liquid diet  - Anemia:  Appears to be chronic and currently asymptomatic.    - Out of bed as tolerated  - Labs in am  - Continue antibiotic therapy

## 2021-10-18 NOTE — PROGRESS NOTES
RUR: 17%     ARAM: Anticipated discharge home w/ wife. Patient's wife to provide transport home once medically stable. Follow-up with PCP/specialist.     POD#4 drainage of pancreatic abscess.      Primary Contact: Wife, Gina Valdze, [phone]     3:30pm - CM reviewed chart. Per interdisplinary rounds, patient will continue be monitored for drainage output and pain management. Discharge pending medical progress. CM to continue to follow as needed.      Gee Hunt, VEL   [phone]

## 2021-10-18 NOTE — PROGRESS NOTES
Pt using urinal with sahara colored urine. C/o abdominal pain and medicated as ordered. Lab work drawn as ordered this afternoon. Resting with wife at bedside for a little while. Bedside shift change report given to Danelle Cogan (oncoming nurse) by Amos White RN (offgoing nurse). Report included the following information SBAR, Kardex, Intake/Output and MAR.

## 2021-10-18 NOTE — PROGRESS NOTES
Extreme abdominal pain this am at change of shift. Seen by PA. dsg to CLARKE drain changed. Pt agreed to take pain meds and anti-emetic this am as well as a Scopolamine patch behind his Lt ear. Wife at bedside during this time. States feeling a little better but still having pain and medicated again as ordered. Still having nausea with dry heaves after Zofran given and priof to. Seen by Dr. Michelle Martin and informed. Medication changed and CT ordered for AM. When speaking with wife this evening, we noticed that pt's abdomen was distended. Bedside shift change report given to Henrietta Beckman, RN (oncoming nurse) by Cristóbal Argueta RN (offgoing nurse). Report included the following information SBAR, Kardex, Intake/Output and MAR.

## 2021-10-18 NOTE — PROGRESS NOTES
General Surgery Daily Progress Note    Admit Date: 10/10/2021  Post-Operative Day: 4 Days Post-Op from Procedure(s):  LAPAROSCOPIC POSSIBLE OPEN DRAINAGE OF PANCREATIC ABCESS     Subjective:     Last 24 hrs: Continued nausea this AM. Persistent LUQ & left abdominal pain--analgesics recently adjusted to morphine from dilaudid. +flatus, no BM  WBC 12.2 this AM & Hgb self-corrected @ 8.0      Objective:     Blood pressure (!) 145/87, pulse 100, temperature 98.8 °F (37.1 °C), resp. rate 16, height 6' (1.829 m), weight 161 lb (73 kg), SpO2 96 %. Temp (24hrs), Av.4 °F (36.9 °C), Min:97.8 °F (36.6 °C), Max:98.8 °F (37.1 °C)      _____________________  Physical Exam:     Alert and Oriented, appears uncomfortable  Cardiovascular: RRR, no peripheral edema  Lungs:CTAB on RA  Abdomen: firm w TTP along L side w clear pink SS fluid in drain. L-sided Jl drain w 15ml/24hrs outout  Incision c/d/i  +BS    Assessment:   Principal Problem:    Sepsis ([facility] [address] (10/10/2021)    Active Problems:    Severe protein-calorie malnutrition ([facility] [address] (10/13/2021)            Plan:     Antiemetics & analgesics as needed  Liquids as tolerated  Try scopolamine patch  Continue to monitor drain output  Apprec Hospitalist erlin over weekend  Further plan per Dr. Tory Lobo    Data Review:    Recent Labs     10/18/21  0238 10/17/21  0257 10/16/21  0232   WBC 12.2* 17.2* 13.0*   HGB 8.0* 8.6* 6.8*   HCT 25.0* 26.7* 20.9*   * 507* 452*     Recent Labs     10/18/21  0238 10/17/21  1724 10/17/21  0257 10/16/21  0232 10/16/21  023   * 131* 131*   < > 134*   K 4.2 4.5 HEMOLYZED,RECOLLECT REQUESTED   < > 3.2*    98 99   < > 101   CO2 24 31 26   < > 28   * 157* 101*   < > 152*   BUN 6 7 6   < > 8   CREA 0.64* 0.74 0.59*   < > 0.68*   CA 7.7* 7.5* 7.6*   < > 7.7*   MG  --   --  2.1  --  1.6   PHOS 3.6 1.8* 7.2*   < > 1.1*    < > = values in this interval not displayed.      Recent Labs     10/17/21  0257   LPSE 22* ______________________  Medications:    Current Facility-Administered Medications   Medication Dose Route Frequency    morphine injection 4 mg  4 mg IntraVENous Q3H PRN    diphenhydrAMINE (BENADRYL) injection 12.5 mg  12.5 mg IntraVENous Q6H PRN    benzocaine-menthoL (CEPACOL) lozenge 1 Lozenge  1 Lozenge Mucous Membrane PRN    oxyCODONE-acetaminophen (PERCOCET) 5-325 mg per tablet 2 Tablet  2 Tablet Oral Q4H PRN    oxyCODONE-acetaminophen (PERCOCET) 5-325 mg per tablet 1 Tablet  1 Tablet Oral Q4H PRN    0.9% sodium chloride infusion 250 mL  250 mL IntraVENous PRN    sodium chloride (NS) flush 5-40 mL  5-40 mL IntraVENous Q8H    sodium chloride (NS) flush 5-40 mL  5-40 mL IntraVENous PRN    dextrose 5% - 0.45% NaCl with KCl 20 mEq/L infusion  125 mL/hr IntraVENous CONTINUOUS    FLUoxetine (PROzac) capsule 40 mg  40 mg Oral DAILY    pantoprazole (PROTONIX) 40 mg in 0.9% sodium chloride 10 mL injection  40 mg IntraVENous DAILY    piperacillin-tazobactam (ZOSYN) 3.375 g in 0.9% sodium chloride (MBP/ADV) 100 mL MBP  3.375 g IntraVENous Q8H    0.9% sodium chloride infusion  75 mL/hr IntraVENous CONTINUOUS    melatonin tablet 3 mg  3 mg Oral QHS    sodium chloride (NS) flush 5-40 mL  5-40 mL IntraVENous Q8H    sodium chloride (NS) flush 5-40 mL  5-40 mL IntraVENous PRN    acetaminophen (TYLENOL) tablet 650 mg  650 mg Oral Q6H PRN    Or    acetaminophen (TYLENOL) suppository 650 mg  650 mg Rectal Q6H PRN    polyethylene glycol (MIRALAX) packet 17 g  17 g Oral DAILY PRN    ondansetron (ZOFRAN ODT) tablet 4 mg  4 mg Oral Q8H PRN    Or    ondansetron (ZOFRAN) injection 4 mg  4 mg IntraVENous Q6H PRN    [Held by provider] enoxaparin (LOVENOX) injection 40 mg  40 mg SubCUTAneous DAILY       LISBETH Heaton  10/18/2021  ATTENDING ADDENDUM  I supervised the APC and reviewed the note. We discussed the plan of care  Pain is his continuing [problem.  Will switch bback to PO Dilaudid with IV backup if really needed.   Will get CT in AM to assess the abscess and also his pancreas in general.

## 2021-10-18 NOTE — PROGRESS NOTES
Bedside shift change report given to Sohail Amin RN (oncoming nurse) by Alexa Sanchez RN (offgoing nurse). Report included the following information SBAR, Kardex, Intake/Output, MAR and Recent Results.

## 2021-10-18 NOTE — PROGRESS NOTES
Pt began complaining of increased abdominal pain, along with complaints of chest pain. Focused assessment of apical pulse, pain to palpation, and vital signs. Pt abdomen firm and tender in LUQ, with umbilical/RUQ soft & nontender hernia noted. Pt had just received his first dose of 4mg morphine IV 3 hours earlier, and did not want another dose of morphine. Patient given zofran and 10mg percocet for pain and nausea. Contacted Dr. Joleen Weiss for a stat 12 lead ekg to rule out any acute infarct. No ST elevation or depression noted. Strips on top of chart for further review.

## 2021-10-19 ENCOUNTER — APPOINTMENT (OUTPATIENT)
Dept: CT IMAGING | Age: 66
DRG: 853 | End: 2021-10-19
Attending: SURGERY
Payer: MEDICARE

## 2021-10-19 LAB — MAGNESIUM SERPL-MCNC: 2.9 MG/DL (ref 1.6–2.4)

## 2021-10-19 PROCEDURE — 74011250636 HC RX REV CODE- 250/636: Performed by: SURGERY

## 2021-10-19 PROCEDURE — 99024 POSTOP FOLLOW-UP VISIT: CPT | Performed by: NURSE PRACTITIONER

## 2021-10-19 PROCEDURE — C9113 INJ PANTOPRAZOLE SODIUM, VIA: HCPCS | Performed by: NURSE PRACTITIONER

## 2021-10-19 PROCEDURE — 74177 CT ABD & PELVIS W/CONTRAST: CPT

## 2021-10-19 PROCEDURE — 65270000029 HC RM PRIVATE

## 2021-10-19 PROCEDURE — 74011000250 HC RX REV CODE- 250: Performed by: NURSE PRACTITIONER

## 2021-10-19 PROCEDURE — 74011000258 HC RX REV CODE- 258: Performed by: SURGERY

## 2021-10-19 PROCEDURE — 74011000636 HC RX REV CODE- 636: Performed by: RADIOLOGY

## 2021-10-19 PROCEDURE — 74011250637 HC RX REV CODE- 250/637: Performed by: INTERNAL MEDICINE

## 2021-10-19 PROCEDURE — 74011250637 HC RX REV CODE- 250/637: Performed by: SURGERY

## 2021-10-19 PROCEDURE — 74011250636 HC RX REV CODE- 250/636: Performed by: NURSE PRACTITIONER

## 2021-10-19 RX ORDER — VITAMIN E 268 MG
400 CAPSULE ORAL 4 TIMES DAILY
Status: DISCONTINUED | OUTPATIENT
Start: 2021-10-19 | End: 2021-10-19 | Stop reason: SDUPTHER

## 2021-10-19 RX ORDER — VITAMIN A 3000 MCG
10000 CAPSULE ORAL 4 TIMES DAILY
Status: DISPENSED | OUTPATIENT
Start: 2021-10-19 | End: 2021-10-24

## 2021-10-19 RX ORDER — VITAMIN A 3000 MCG
10000 CAPSULE ORAL DAILY
Status: CANCELLED | OUTPATIENT
Start: 2021-10-19

## 2021-10-19 RX ORDER — LANOLIN ALCOHOL/MO/W.PET/CERES
100 CREAM (GRAM) TOPICAL DAILY
Status: DISCONTINUED | OUTPATIENT
Start: 2021-10-19 | End: 2021-10-27 | Stop reason: HOSPADM

## 2021-10-19 RX ORDER — LANOLIN ALCOHOL/MO/W.PET/CERES
400 CREAM (GRAM) TOPICAL 4 TIMES DAILY
Status: DISCONTINUED | OUTPATIENT
Start: 2021-10-19 | End: 2021-10-27 | Stop reason: HOSPADM

## 2021-10-19 RX ADMIN — FLUOXETINE 40 MG: 20 CAPSULE ORAL at 10:15

## 2021-10-19 RX ADMIN — HYDROMORPHONE HYDROCHLORIDE 1 MG: 1 INJECTION, SOLUTION INTRAMUSCULAR; INTRAVENOUS; SUBCUTANEOUS at 22:00

## 2021-10-19 RX ADMIN — Medication 10 ML: at 12:38

## 2021-10-19 RX ADMIN — IOPAMIDOL 100 ML: 755 INJECTION, SOLUTION INTRAVENOUS at 13:28

## 2021-10-19 RX ADMIN — PIPERACILLIN AND TAZOBACTAM 3.38 G: 3; .375 INJECTION, POWDER, LYOPHILIZED, FOR SOLUTION INTRAVENOUS at 04:25

## 2021-10-19 RX ADMIN — Medication 10000 UNITS: at 17:48

## 2021-10-19 RX ADMIN — VITAMIN E CAP 400 UNIT 400 UNITS: 400 CAP at 17:48

## 2021-10-19 RX ADMIN — Medication 3 MG: at 22:00

## 2021-10-19 RX ADMIN — Medication 10 ML: at 13:49

## 2021-10-19 RX ADMIN — PIPERACILLIN AND TAZOBACTAM 3.38 G: 3; .375 INJECTION, POWDER, LYOPHILIZED, FOR SOLUTION INTRAVENOUS at 19:24

## 2021-10-19 RX ADMIN — SODIUM CHLORIDE 40 MG: 9 INJECTION INTRAMUSCULAR; INTRAVENOUS; SUBCUTANEOUS at 10:14

## 2021-10-19 RX ADMIN — Medication 10000 UNITS: at 13:43

## 2021-10-19 RX ADMIN — HYDROMORPHONE HYDROCHLORIDE 1 MG: 1 INJECTION, SOLUTION INTRAMUSCULAR; INTRAVENOUS; SUBCUTANEOUS at 17:54

## 2021-10-19 RX ADMIN — Medication 100 MG: at 13:43

## 2021-10-19 RX ADMIN — HYDROMORPHONE HYDROCHLORIDE 1 MG: 1 INJECTION, SOLUTION INTRAMUSCULAR; INTRAVENOUS; SUBCUTANEOUS at 04:25

## 2021-10-19 RX ADMIN — DIPHENHYDRAMINE HYDROCHLORIDE 12.5 MG: 50 INJECTION, SOLUTION INTRAMUSCULAR; INTRAVENOUS at 22:00

## 2021-10-19 RX ADMIN — Medication 10 ML: at 13:48

## 2021-10-19 RX ADMIN — PIPERACILLIN AND TAZOBACTAM 3.38 G: 3; .375 INJECTION, POWDER, LYOPHILIZED, FOR SOLUTION INTRAVENOUS at 13:43

## 2021-10-19 RX ADMIN — POTASSIUM CHLORIDE, DEXTROSE MONOHYDRATE AND SODIUM CHLORIDE 125 ML/HR: 150; 5; 450 INJECTION, SOLUTION INTRAVENOUS at 19:23

## 2021-10-19 RX ADMIN — HYDROMORPHONE HYDROCHLORIDE 1 MG: 1 INJECTION, SOLUTION INTRAMUSCULAR; INTRAVENOUS; SUBCUTANEOUS at 12:38

## 2021-10-19 NOTE — PROGRESS NOTES
General Surgery Daily Progress Note    Admit Date: 10/10/2021  Post-Operative Day: 5 Days Post-Op from Procedure(s):  LAPAROSCOPIC POSSIBLE OPEN DRAINAGE OF PANCREATIC ABCESS     Subjective:     Last 24 hrs: pt much improved today as far as pain and nausea are concerned. Last pain med 6hrs ago. Mild nausea after taking full liq diet  Awaiting CT scan - no labs today    Objective:     Blood pressure 113/68, pulse 77, temperature 98.4 °F (36.9 °C), resp. rate 16, height 6' (1.829 m), weight 161 lb (73 kg), SpO2 93 %. Temp (24hrs), Av °F (36.7 °C), Min:97.5 °F (36.4 °C), Max:98.7 °F (37.1 °C)      _____________________  Physical Exam:     Alert and Oriented, x3, in no acute distress.   Cardiovascular: RRR, no peripheral edema  Abdomen: flat, NT      Assessment:   Principal Problem:    Sepsis ([facility] [address] (10/10/2021)    Active Problems:    Severe protein-calorie malnutrition ([facility] [address] (10/13/2021)            Plan:     CT today   Npo for CT, then full liq diet  Daily labs  Cont zosyn  PPI      Data Review:    Recent Labs     10/18/21  0238 10/17/21  0257   WBC 12.2* 17.2*   HGB 8.0* 8.6*   HCT 25.0* 26.7*   * 507*     Recent Labs     10/18/21  0238 10/17/21  1724 10/17/21  0257   * 131* 131*   K 4.2 4.5 HEMOLYZED,RECOLLECT REQUESTED    98 99   CO2 24 31 26   * 157* 101*   BUN 6 7 6   CREA 0.64* 0.74 0.59*   CA 7.7* 7.5* 7.6*   MG 2.9*  --  2.1   PHOS 3.6 1.8* 7.2*     Recent Labs     10/17/21  0257   LPSE 22*           ______________________  Medications:    Current Facility-Administered Medications   Medication Dose Route Frequency    iopamidoL (ISOVUE-370) 76 % injection 100 mL  100 mL IntraVENous RAD ONCE    thiamine HCL (B-1) tablet 100 mg  100 mg Oral DAILY    vitamin A (AQUASOL A) capsule 10,000 Units  10,000 Units Oral QID    vitamin E (AQUA GEMS) capsule 400 Units  400 Units Oral QID    scopolamine (TRANSDERM-SCOP) 1 mg over 3 days 1 Patch  1 Patch TransDERmal Q72H    HYDROmorphone (DILAUDID) tablet 2 mg  2 mg Oral Q3H PRN    HYDROmorphone (DILAUDID) injection 1 mg  1 mg IntraVENous Q4H PRN    diphenhydrAMINE (BENADRYL) injection 12.5 mg  12.5 mg IntraVENous Q6H PRN    benzocaine-menthoL (CEPACOL) lozenge 1 Lozenge  1 Lozenge Mucous Membrane PRN    0.9% sodium chloride infusion 250 mL  250 mL IntraVENous PRN    sodium chloride (NS) flush 5-40 mL  5-40 mL IntraVENous Q8H    sodium chloride (NS) flush 5-40 mL  5-40 mL IntraVENous PRN    dextrose 5% - 0.45% NaCl with KCl 20 mEq/L infusion  125 mL/hr IntraVENous CONTINUOUS    FLUoxetine (PROzac) capsule 40 mg  40 mg Oral DAILY    pantoprazole (PROTONIX) 40 mg in 0.9% sodium chloride 10 mL injection  40 mg IntraVENous DAILY    piperacillin-tazobactam (ZOSYN) 3.375 g in 0.9% sodium chloride (MBP/ADV) 100 mL MBP  3.375 g IntraVENous Q8H    melatonin tablet 3 mg  3 mg Oral QHS    sodium chloride (NS) flush 5-40 mL  5-40 mL IntraVENous Q8H    sodium chloride (NS) flush 5-40 mL  5-40 mL IntraVENous PRN    acetaminophen (TYLENOL) tablet 650 mg  650 mg Oral Q6H PRN    Or    acetaminophen (TYLENOL) suppository 650 mg  650 mg Rectal Q6H PRN    polyethylene glycol (MIRALAX) packet 17 g  17 g Oral DAILY PRN    ondansetron (ZOFRAN ODT) tablet 4 mg  4 mg Oral Q8H PRN    Or    ondansetron (ZOFRAN) injection 4 mg  4 mg IntraVENous Q6H PRN    [Held by provider] enoxaparin (LOVENOX) injection 40 mg  40 mg SubCUTAneous DAILY       Meryl Chandler NP  10/19/2021  ATTENDING ADDENDUM  I supervised the APC and reviewed the note. We discussed the plan of care  CT shows resolution of all but one abscess, located behind the antrum of the stomach. Will stay on antibiotics for now, hoping this will resolve. If not, may need another drainage.

## 2021-10-19 NOTE — PROGRESS NOTES
Comprehensive Nutrition Assessment    Type and Reason for Visit: Reassess    Nutrition Recommendations/Plan:    1. Continue diet per surgery. If unable to tolerate at least 50% meals and 2 supplements/day in 48hrs start PN - see below for goal if needed   - consider adding compazine or phenergan for nausea mgmt since zofran not effective    2. Likely refeeding with D5 over the weekend. Continue to follow electrolytes. - Added 100mg thiamine    3. Low vitamin A & E values:    (a) Replete during admit:     - Vitamin A = 10,0000IU QID x 5 days     - Vitamin E = 400IU QID    (b) Follow-up outpatient for:     - recheck of serum levels    - maintenance dosing with DEKAs Essential Capsules (or equivalent fat-soluble vitamin supplement) for maintenance    Nutrition Assessment:    Pt admitted for sepsis. PMHx: bile duct stricture s/p choledochojejunostomy (2016), chronic pancreatitis. Bacteremia, depression, GERD, pancreatic cyst. Chronic issues with pancreatitis with handful of episodes each year. Sp open pancreatic fluid collection drainage on 10/15. Abd pain and nausea over past couple days with plans for repeat CT today. Altered electrolytes over the weekend with D5 after surgery likley contributing to refeeding syndrome. Repleted and now WNL. Will add thiamine. Pt visited today. Poor PO intake for past 2 weeks. Temporal and upper extremity wasting observed. Pt NPO this morning for CT so no breakfast. Only small amount of PO yesterday with nausea. Scopolamine patch added and pt reports pain regimen adjusted to hopefully also help with nausea. None yet today. Will follow for PO intake/tolerance. D5 @ 125ml/hr providing 3000ml, 150g CHO, 510kcal. Pt remains at low threshold for needing TPN, depending on diet tolerance. If needed recommend goal: 5%AA, D15 @ 83ml/hr + 250ml, 20% lipids daily. Provides: 1920kcal, 100g protein, 300g CHO, 2000ml fluid. Meets 100% protein, 97% energy needs.      On pancreatic enzymes at home (Creon 58852 capsules: 2-3 capsules/meal depending on mealsize). Low vitamin A &E noted today. See vitamin recommendations above. Will likely need maintenance supplementation - defer to outpatient GI/PCP pending lab trends outpatient. Discussed with pt and added to d/c paperwork for pt to also follow. UBW 160lbs, at home weight down to 150# (68.2kg) - indicates wt loss of 7%. Malnutrition Assessment:  Malnutrition Status:  Severe malnutrition    Context:  Acute illness     Findings of the 6 clinical characteristics of malnutrition:   Energy Intake:  1 - 75% or less of est energy req for 7 or more days  Weight Loss:  1.00 - 7.5% over 3 months     Body Fat Loss:  7 - Moderate body fat loss, Buccal region   Muscle Mass Loss:  7 - Moderate muscle mass loss, Temples (temporalis)  Fluid Accumulation:  Unable to assess,     Strength:  Not performed     Nutritionally Significant Medications: prozac, protonix, zosyn, scopolamine, D5 1/2 NaCl w/ KCL @ 125ml/hr    Estimated Daily Nutrient Needs:  Energy (kcal): 1977-2157kcal (MSJ x 1.2 x 1.1-1.2); Weight Used for Energy Requirements: Other (specify) (68kg)  Protein (g): 95-109g (1.4-1.6g/kg);  Weight Used for Protein Requirements: Other (specify) (68kg)  Fluid (ml/day): 2100; Method Used for Fluid Requirements: 1 ml/kcal    Nutrition Related Findings:       BM: 10/9  Edema: none  Wounds:  None       Current Nutrition Therapies:   Diet: full liquids  Supplements: Ensure Clear TID    Anthropometric Measures:  · Height:  6' (182.9 cm)  · Current Body Wt:  68 kg (150 lb)   · Admission Body Wt:  161 lb    · Usual Body Wt:        · Ideal Body Wt:  178:  84.3 %   Wt Readings from Last 10 Encounters:   10/11/21 73 kg (161 lb)   10/10/18 83 kg (183 lb)   08/31/18 83 kg (183 lb)   08/22/18 83 kg (183 lb)   07/12/17 82.6 kg (182 lb 3 oz)   02/08/17 82.1 kg (181 lb)   01/13/17 82.1 kg (181 lb)   03/16/16 83 kg (183 lb)   03/02/16 75.8 kg (167 lb)   02/18/16 81.2 kg (179 lb 0.2 oz)     Nutrition Diagnosis:   · Inadequate protein-energy intake, Severe malnutrition related to altered GI function, impaired nutrient utilization as evidenced by severe loss of subcutaneous fat, severe muscle loss, poor intake prior to admission    · Impaired nutrient utilization, Altered nutrition-related lab values related to altered GI function as evidenced by lab values (PERT with chronic pancreatitis with low A&E)    Nutrition Interventions:   Food and/or Nutrient Delivery: Continue current diet, Continue oral nutrition supplement, Vitamin supplement, Mineral supplement  Nutrition Education and Counseling: Education completed  Coordination of Nutrition Care: Interdisciplinary rounds, Continue to monitor while inpatient    Goals:  Consumption of at least 75% meals in 2-3 days or consider TPN       Nutrition Monitoring and Evaluation:   Behavioral-Environmental Outcomes: None identified  Food/Nutrient Intake Outcomes: Diet advancement/tolerance, Supplement intake, Vitamin/mineral intake  Physical Signs/Symptoms Outcomes: Weight, GI status, Biochemical data, Nausea/vomiting    Discharge Planning:    Continue oral nutrition supplement, Continue current diet     Larry Lima RD  [facility], Contact via [employer]

## 2021-10-19 NOTE — PROGRESS NOTES
Current dose of Zosyn not infusing as CT called to get pt but he had no IV access, New line placed and CST notified but they still have not gotten ot yet. Pt remains NPO for procedure.

## 2021-10-19 NOTE — DISCHARGE INSTR - DIET
NUTRITION     Follow for Vitamin A and Vitamin E levels in 3 months with PCP or GI provider. Likely will need fat-soluble vitamins for maintenance with Creon/chronic pancreatitis. Check with PCP for maintenance dosing with DEKAs Essential Capsules (or equivalent fat-soluble vitamin supplement). Nutrition Outpatient Counseling: Call Central Scheduling at [phone] to schedule an appointment for nutrition education at (1) [facility]; (2) [facility], or (3) [facility].      Sandee Guerra RD

## 2021-10-20 LAB
ANION GAP SERPL CALC-SCNC: 3 MMOL/L (ref 5–15)
BASOPHILS # BLD: 0 K/UL (ref 0–0.1)
BASOPHILS NFR BLD: 0 % (ref 0–1)
BUN SERPL-MCNC: 5 MG/DL (ref 6–20)
BUN/CREAT SERPL: 6 (ref 12–20)
CALCIUM SERPL-MCNC: 8.5 MG/DL (ref 8.5–10.1)
CHLORIDE SERPL-SCNC: 100 MMOL/L (ref 97–108)
CO2 SERPL-SCNC: 30 MMOL/L (ref 21–32)
CREAT SERPL-MCNC: 0.83 MG/DL (ref 0.7–1.3)
DIFFERENTIAL METHOD BLD: ABNORMAL
EOSINOPHIL # BLD: 0.2 K/UL (ref 0–0.4)
EOSINOPHIL NFR BLD: 2 % (ref 0–7)
ERYTHROCYTE [DISTWIDTH] IN BLOOD BY AUTOMATED COUNT: 15.9 % (ref 11.5–14.5)
GLUCOSE BLD STRIP.AUTO-MCNC: 125 MG/DL (ref 65–117)
GLUCOSE SERPL-MCNC: 117 MG/DL (ref 65–100)
HCT VFR BLD AUTO: 20 % (ref 36.6–50.3)
HCT VFR BLD AUTO: 20.5 % (ref 36.6–50.3)
HGB BLD-MCNC: 6.4 G/DL (ref 12.1–17)
HGB BLD-MCNC: 6.6 G/DL (ref 12.1–17)
HISTORY CHECKED?,CKHIST: NORMAL
IMM GRANULOCYTES # BLD AUTO: 0 K/UL
IMM GRANULOCYTES NFR BLD AUTO: 0 %
LYMPHOCYTES # BLD: 0.5 K/UL (ref 0.8–3.5)
LYMPHOCYTES NFR BLD: 5 % (ref 12–49)
MAGNESIUM SERPL-MCNC: 1.8 MG/DL (ref 1.6–2.4)
MCH RBC QN AUTO: 27.8 PG (ref 26–34)
MCHC RBC AUTO-ENTMCNC: 32.2 G/DL (ref 30–36.5)
MCV RBC AUTO: 86.5 FL (ref 80–99)
MONOCYTES # BLD: 0.6 K/UL (ref 0–1)
MONOCYTES NFR BLD: 6 % (ref 5–13)
NEUTS SEG # BLD: 8.1 K/UL (ref 1.8–8)
NEUTS SEG NFR BLD: 87 % (ref 32–75)
NRBC # BLD: 0 K/UL (ref 0–0.01)
NRBC BLD-RTO: 0 PER 100 WBC
PHOSPHATE SERPL-MCNC: 2.6 MG/DL (ref 2.6–4.7)
PLATELET # BLD AUTO: 422 K/UL (ref 150–400)
PMV BLD AUTO: 8.5 FL (ref 8.9–12.9)
POTASSIUM SERPL-SCNC: 4 MMOL/L (ref 3.5–5.1)
RBC # BLD AUTO: 2.37 M/UL (ref 4.1–5.7)
RBC MORPH BLD: ABNORMAL
SERVICE CMNT-IMP: ABNORMAL
SODIUM SERPL-SCNC: 133 MMOL/L (ref 136–145)
WBC # BLD AUTO: 9.4 K/UL (ref 4.1–11.1)

## 2021-10-20 PROCEDURE — 74011250637 HC RX REV CODE- 250/637: Performed by: INTERNAL MEDICINE

## 2021-10-20 PROCEDURE — 86901 BLOOD TYPING SEROLOGIC RH(D): CPT

## 2021-10-20 PROCEDURE — 74011250637 HC RX REV CODE- 250/637: Performed by: SURGERY

## 2021-10-20 PROCEDURE — P9016 RBC LEUKOCYTES REDUCED: HCPCS

## 2021-10-20 PROCEDURE — 85018 HEMOGLOBIN: CPT

## 2021-10-20 PROCEDURE — 30233N1 TRANSFUSION OF NONAUTOLOGOUS RED BLOOD CELLS INTO PERIPHERAL VEIN, PERCUTANEOUS APPROACH: ICD-10-PCS | Performed by: SURGERY

## 2021-10-20 PROCEDURE — 65270000029 HC RM PRIVATE

## 2021-10-20 PROCEDURE — 86923 COMPATIBILITY TEST ELECTRIC: CPT

## 2021-10-20 PROCEDURE — 85025 COMPLETE CBC W/AUTO DIFF WBC: CPT

## 2021-10-20 PROCEDURE — 82962 GLUCOSE BLOOD TEST: CPT

## 2021-10-20 PROCEDURE — 84100 ASSAY OF PHOSPHORUS: CPT

## 2021-10-20 PROCEDURE — 74011250636 HC RX REV CODE- 250/636: Performed by: SURGERY

## 2021-10-20 PROCEDURE — 36430 TRANSFUSION BLD/BLD COMPNT: CPT

## 2021-10-20 PROCEDURE — 99024 POSTOP FOLLOW-UP VISIT: CPT | Performed by: NURSE PRACTITIONER

## 2021-10-20 PROCEDURE — C9113 INJ PANTOPRAZOLE SODIUM, VIA: HCPCS | Performed by: NURSE PRACTITIONER

## 2021-10-20 PROCEDURE — 36415 COLL VENOUS BLD VENIPUNCTURE: CPT

## 2021-10-20 PROCEDURE — 83735 ASSAY OF MAGNESIUM: CPT

## 2021-10-20 PROCEDURE — 74011250636 HC RX REV CODE- 250/636: Performed by: NURSE PRACTITIONER

## 2021-10-20 PROCEDURE — 74011000258 HC RX REV CODE- 258: Performed by: SURGERY

## 2021-10-20 PROCEDURE — 74011250637 HC RX REV CODE- 250/637: Performed by: NURSE PRACTITIONER

## 2021-10-20 PROCEDURE — 80048 BASIC METABOLIC PNL TOTAL CA: CPT

## 2021-10-20 PROCEDURE — 74011000250 HC RX REV CODE- 250: Performed by: NURSE PRACTITIONER

## 2021-10-20 RX ORDER — DEXTROSE, SODIUM CHLORIDE, AND POTASSIUM CHLORIDE 5; .45; .15 G/100ML; G/100ML; G/100ML
50 INJECTION INTRAVENOUS CONTINUOUS
Status: DISCONTINUED | OUTPATIENT
Start: 2021-10-20 | End: 2021-10-27 | Stop reason: HOSPADM

## 2021-10-20 RX ORDER — SODIUM CHLORIDE 9 MG/ML
250 INJECTION, SOLUTION INTRAVENOUS AS NEEDED
Status: DISCONTINUED | OUTPATIENT
Start: 2021-10-20 | End: 2021-10-27 | Stop reason: HOSPADM

## 2021-10-20 RX ADMIN — PIPERACILLIN AND TAZOBACTAM 3.38 G: 3; .375 INJECTION, POWDER, LYOPHILIZED, FOR SOLUTION INTRAVENOUS at 11:36

## 2021-10-20 RX ADMIN — HYDROMORPHONE HYDROCHLORIDE 1 MG: 1 INJECTION, SOLUTION INTRAMUSCULAR; INTRAVENOUS; SUBCUTANEOUS at 13:02

## 2021-10-20 RX ADMIN — Medication 10000 UNITS: at 18:37

## 2021-10-20 RX ADMIN — Medication 10 ML: at 22:00

## 2021-10-20 RX ADMIN — PANCRELIPASE 2 CAPSULE: 60000; 12000; 38000 CAPSULE, DELAYED RELEASE PELLETS ORAL at 18:36

## 2021-10-20 RX ADMIN — Medication 10 ML: at 18:36

## 2021-10-20 RX ADMIN — PANCRELIPASE 2 CAPSULE: 60000; 12000; 38000 CAPSULE, DELAYED RELEASE PELLETS ORAL at 11:36

## 2021-10-20 RX ADMIN — VITAMIN E CAP 400 UNIT 400 UNITS: 400 CAP at 13:05

## 2021-10-20 RX ADMIN — VITAMIN E CAP 400 UNIT 400 UNITS: 400 CAP at 09:07

## 2021-10-20 RX ADMIN — SODIUM CHLORIDE 40 MG: 9 INJECTION INTRAMUSCULAR; INTRAVENOUS; SUBCUTANEOUS at 09:08

## 2021-10-20 RX ADMIN — Medication 3 MG: at 22:20

## 2021-10-20 RX ADMIN — Medication 10 ML: at 18:37

## 2021-10-20 RX ADMIN — VITAMIN E CAP 400 UNIT 400 UNITS: 400 CAP at 18:37

## 2021-10-20 RX ADMIN — POTASSIUM CHLORIDE, DEXTROSE MONOHYDRATE AND SODIUM CHLORIDE 50 ML/HR: 150; 5; 450 INJECTION, SOLUTION INTRAVENOUS at 11:38

## 2021-10-20 RX ADMIN — Medication 10000 UNITS: at 22:19

## 2021-10-20 RX ADMIN — Medication 10000 UNITS: at 09:07

## 2021-10-20 RX ADMIN — PIPERACILLIN AND TAZOBACTAM 3.38 G: 3; .375 INJECTION, POWDER, LYOPHILIZED, FOR SOLUTION INTRAVENOUS at 02:38

## 2021-10-20 RX ADMIN — FLUOXETINE 40 MG: 20 CAPSULE ORAL at 09:07

## 2021-10-20 RX ADMIN — Medication 10000 UNITS: at 13:05

## 2021-10-20 RX ADMIN — Medication 100 MG: at 09:07

## 2021-10-20 NOTE — PROGRESS NOTES
Bedside and Verbal shift change report given to Yissel Beckman RN  (oncoming nurse) by Annie Liu (offgoing nurse). Report included the following information SBAR and Kardex.

## 2021-10-21 LAB
ABO + RH BLD: NORMAL
ANION GAP SERPL CALC-SCNC: 4 MMOL/L (ref 5–15)
BASOPHILS # BLD: 0 K/UL (ref 0–0.1)
BASOPHILS NFR BLD: 0 % (ref 0–1)
BLD PROD TYP BPU: NORMAL
BLOOD GROUP ANTIBODIES SERPL: NORMAL
BPU ID: NORMAL
BUN SERPL-MCNC: 4 MG/DL (ref 6–20)
BUN/CREAT SERPL: 5 (ref 12–20)
CALCIUM SERPL-MCNC: 8.5 MG/DL (ref 8.5–10.1)
CHLORIDE SERPL-SCNC: 102 MMOL/L (ref 97–108)
CO2 SERPL-SCNC: 30 MMOL/L (ref 21–32)
CREAT SERPL-MCNC: 0.84 MG/DL (ref 0.7–1.3)
CROSSMATCH RESULT,%XM: NORMAL
DIFFERENTIAL METHOD BLD: ABNORMAL
EOSINOPHIL # BLD: 0.1 K/UL (ref 0–0.4)
EOSINOPHIL NFR BLD: 1 % (ref 0–7)
ERYTHROCYTE [DISTWIDTH] IN BLOOD BY AUTOMATED COUNT: 15.2 % (ref 11.5–14.5)
GLUCOSE SERPL-MCNC: 171 MG/DL (ref 65–100)
HCT VFR BLD AUTO: 23.3 % (ref 36.6–50.3)
HGB BLD-MCNC: 7.7 G/DL (ref 12.1–17)
IMM GRANULOCYTES # BLD AUTO: 0.1 K/UL (ref 0–0.04)
IMM GRANULOCYTES NFR BLD AUTO: 1 % (ref 0–0.5)
LYMPHOCYTES # BLD: 1.3 K/UL (ref 0.8–3.5)
LYMPHOCYTES NFR BLD: 15 % (ref 12–49)
MCH RBC QN AUTO: 28.4 PG (ref 26–34)
MCHC RBC AUTO-ENTMCNC: 33 G/DL (ref 30–36.5)
MCV RBC AUTO: 86 FL (ref 80–99)
MONOCYTES # BLD: 0.8 K/UL (ref 0–1)
MONOCYTES NFR BLD: 9 % (ref 5–13)
NEUTS SEG # BLD: 6.7 K/UL (ref 1.8–8)
NEUTS SEG NFR BLD: 74 % (ref 32–75)
NRBC # BLD: 0 K/UL (ref 0–0.01)
NRBC BLD-RTO: 0 PER 100 WBC
PLATELET # BLD AUTO: 420 K/UL (ref 150–400)
PMV BLD AUTO: 8.5 FL (ref 8.9–12.9)
POTASSIUM SERPL-SCNC: 3.8 MMOL/L (ref 3.5–5.1)
RBC # BLD AUTO: 2.71 M/UL (ref 4.1–5.7)
SODIUM SERPL-SCNC: 136 MMOL/L (ref 136–145)
SPECIMEN EXP DATE BLD: NORMAL
STATUS OF UNIT,%ST: NORMAL
UNIT DIVISION, %UDIV: 0
WBC # BLD AUTO: 9 K/UL (ref 4.1–11.1)

## 2021-10-21 PROCEDURE — 74011250637 HC RX REV CODE- 250/637: Performed by: INTERNAL MEDICINE

## 2021-10-21 PROCEDURE — 80048 BASIC METABOLIC PNL TOTAL CA: CPT

## 2021-10-21 PROCEDURE — 74011250636 HC RX REV CODE- 250/636: Performed by: SURGERY

## 2021-10-21 PROCEDURE — 85025 COMPLETE CBC W/AUTO DIFF WBC: CPT

## 2021-10-21 PROCEDURE — 74011250637 HC RX REV CODE- 250/637: Performed by: SURGERY

## 2021-10-21 PROCEDURE — 74011250636 HC RX REV CODE- 250/636: Performed by: NURSE PRACTITIONER

## 2021-10-21 PROCEDURE — 74011250637 HC RX REV CODE- 250/637: Performed by: NURSE PRACTITIONER

## 2021-10-21 PROCEDURE — 65270000029 HC RM PRIVATE

## 2021-10-21 PROCEDURE — 74011000258 HC RX REV CODE- 258: Performed by: SURGERY

## 2021-10-21 PROCEDURE — 74011000250 HC RX REV CODE- 250: Performed by: NURSE PRACTITIONER

## 2021-10-21 PROCEDURE — 36415 COLL VENOUS BLD VENIPUNCTURE: CPT

## 2021-10-21 PROCEDURE — C9113 INJ PANTOPRAZOLE SODIUM, VIA: HCPCS | Performed by: NURSE PRACTITIONER

## 2021-10-21 PROCEDURE — 74011250637 HC RX REV CODE- 250/637: Performed by: PHYSICIAN ASSISTANT

## 2021-10-21 PROCEDURE — 99024 POSTOP FOLLOW-UP VISIT: CPT | Performed by: NURSE PRACTITIONER

## 2021-10-21 RX ORDER — TRAZODONE HYDROCHLORIDE 50 MG/1
50 TABLET ORAL
Status: DISCONTINUED | OUTPATIENT
Start: 2021-10-21 | End: 2021-10-22

## 2021-10-21 RX ADMIN — VITAMIN E CAP 400 UNIT 400 UNITS: 400 CAP at 09:23

## 2021-10-21 RX ADMIN — Medication 100 MG: at 09:23

## 2021-10-21 RX ADMIN — FLUOXETINE 40 MG: 20 CAPSULE ORAL at 09:23

## 2021-10-21 RX ADMIN — PANCRELIPASE 2 CAPSULE: 60000; 12000; 38000 CAPSULE, DELAYED RELEASE PELLETS ORAL at 07:30

## 2021-10-21 RX ADMIN — Medication 10000 UNITS: at 21:56

## 2021-10-21 RX ADMIN — Medication 10000 UNITS: at 18:36

## 2021-10-21 RX ADMIN — PIPERACILLIN AND TAZOBACTAM 3.38 G: 3; .375 INJECTION, POWDER, LYOPHILIZED, FOR SOLUTION INTRAVENOUS at 03:37

## 2021-10-21 RX ADMIN — Medication 10000 UNITS: at 09:26

## 2021-10-21 RX ADMIN — PANCRELIPASE 2 CAPSULE: 60000; 12000; 38000 CAPSULE, DELAYED RELEASE PELLETS ORAL at 16:25

## 2021-10-21 RX ADMIN — VITAMIN E CAP 400 UNIT 400 UNITS: 400 CAP at 21:56

## 2021-10-21 RX ADMIN — SODIUM CHLORIDE 40 MG: 9 INJECTION INTRAMUSCULAR; INTRAVENOUS; SUBCUTANEOUS at 09:18

## 2021-10-21 RX ADMIN — Medication 10000 UNITS: at 12:48

## 2021-10-21 RX ADMIN — PANCRELIPASE 2 CAPSULE: 60000; 12000; 38000 CAPSULE, DELAYED RELEASE PELLETS ORAL at 12:32

## 2021-10-21 RX ADMIN — HYDROMORPHONE HYDROCHLORIDE 1 MG: 1 INJECTION, SOLUTION INTRAMUSCULAR; INTRAVENOUS; SUBCUTANEOUS at 01:44

## 2021-10-21 RX ADMIN — DIPHENHYDRAMINE HYDROCHLORIDE 12.5 MG: 50 INJECTION, SOLUTION INTRAMUSCULAR; INTRAVENOUS at 01:45

## 2021-10-21 RX ADMIN — PIPERACILLIN AND TAZOBACTAM 3.38 G: 3; .375 INJECTION, POWDER, LYOPHILIZED, FOR SOLUTION INTRAVENOUS at 12:08

## 2021-10-21 RX ADMIN — Medication 10 ML: at 13:03

## 2021-10-21 RX ADMIN — VITAMIN E CAP 400 UNIT 400 UNITS: 400 CAP at 12:48

## 2021-10-21 RX ADMIN — Medication 10 ML: at 21:56

## 2021-10-21 RX ADMIN — VITAMIN E CAP 400 UNIT 400 UNITS: 400 CAP at 18:36

## 2021-10-21 RX ADMIN — PIPERACILLIN AND TAZOBACTAM 3.38 G: 3; .375 INJECTION, POWDER, LYOPHILIZED, FOR SOLUTION INTRAVENOUS at 19:34

## 2021-10-21 NOTE — PROGRESS NOTES
Jessica Garcia aware of rop in Hgb this am an ordered repeat 1500 H&H that was obtained late. Repeat results were 6.4 HGB,  [place] was no longer available and [place] reported that Dr Maame Aleman had already maurice in. Call placed to Julius Goldmann Dr Vevelyn Screws. Orders obtained for pt to receive one unit RBC's tonight. type and cross required and sent. 1900 zosyn held due to need of site to infuse blood. Bedside and Verbal shift change report given to Bello Albarado RN  (oncoming nurse) by Mel Alexanedr (offgoing nurse). Report included the following information SBAR and Kardex.

## 2021-10-21 NOTE — PROGRESS NOTES
Surgery Progress Note    Admit Date: 10/10/2021      Subjective:     Feeling a little better this morning. HGb up 7.7 with receiving a unit of RBC's yesterday evening. Has been up and about to the bathroom, +Bm. Some nausea when he drinks to quickly. Ate 1/2 turkey sandwich last night. Tmax 99.3. Denies fevers or chills. Objective:     Patient Vitals for the past 8 hrs:   BP Temp Pulse Resp SpO2   10/21/21 0751 127/79 98.3 °F (36.8 °C) (!) 59 16 100 %   10/21/21 0045 128/72 98.6 °F (37 °C) 82 16 96 %     No intake/output data recorded. 10/19 1901 - 10/21 0700  In: 245   Out: 10 [Drains:10]ip  Physical Exam:    General: alert, cooperative, no distress  Cardiac: normal S1 and S2  Lungs: Normal chest wall and respirations. Clear to auscultation. Abdomen: soft, mild tenderness epigastric, CLARKE drain with scant blood tinged drainage. Wounds:clean, dry      CBC:   Lab Results   Component Value Date/Time    WBC 9.0 10/21/2021 05:39 AM    RBC 2.71 (L) 10/21/2021 05:39 AM    HGB 7.7 (L) 10/21/2021 05:39 AM    HCT 23.3 (L) 10/21/2021 05:39 AM    PLATELET 159 (H) 62/27/4600 05:39 AM     BMP:   Lab Results   Component Value Date/Time    Glucose 171 (H) 10/21/2021 05:39 AM    Sodium 136 10/21/2021 05:39 AM    Potassium 3.8 10/21/2021 05:39 AM    Chloride 102 10/21/2021 05:39 AM    CO2 30 10/21/2021 05:39 AM    BUN 4 (L) 10/21/2021 05:39 AM    Creatinine 0.84 10/21/2021 05:39 AM    Calcium 8.5 10/21/2021 05:39 AM     CMP:  Lab Results   Component Value Date/Time    Glucose 171 (H) 10/21/2021 05:39 AM    Sodium 136 10/21/2021 05:39 AM    Potassium 3.8 10/21/2021 05:39 AM    Chloride 102 10/21/2021 05:39 AM    CO2 30 10/21/2021 05:39 AM    BUN 4 (L) 10/21/2021 05:39 AM    Creatinine 0.84 10/21/2021 05:39 AM    Calcium 8.5 10/21/2021 05:39 AM    Anion gap 4 (L) 10/21/2021 05:39 AM    BUN/Creatinine ratio 5 (L) 10/21/2021 05:39 AM    Alk.  phosphatase 79 10/11/2021 06:34 AM    Protein, total 6.6 10/11/2021 06:34 AM    Albumin 2.1 (L) 10/11/2021 06:34 AM    Globulin 4.5 (H) 10/11/2021 06:34 AM    A-G Ratio 0.5 (L) 10/11/2021 06:34 AM               Assessment:   Pt is POD #7 s/p Procedure(s):  LAPAROSCOPIC POSSIBLE OPEN DRAINAGE OF PANCREATIC ABCESS  Principal Problem:    Sepsis ([facility] [address] (10/10/2021)     Active Problems:    Severe protein-calorie malnutrition ([facility] [address] (10/13/2021)       Plan:   Diet as tolerated. Continue Zosyn  PPI   OOB  Labs in am.   Further plan per Dr. Julissa Beaulieu, NP    ATTENDING ADDENDUM  I supervised the APC and reviewed the note. We discussed the plan of care  Continues to eat better. No fever. Pain is less. CLARKE drainage just serous now.   Will pull In the AM.

## 2021-10-22 LAB
ANION GAP SERPL CALC-SCNC: 5 MMOL/L (ref 5–15)
BASOPHILS # BLD: 0 K/UL (ref 0–0.1)
BASOPHILS NFR BLD: 0 % (ref 0–1)
BUN SERPL-MCNC: 4 MG/DL (ref 6–20)
BUN/CREAT SERPL: 5 (ref 12–20)
CALCIUM SERPL-MCNC: 9 MG/DL (ref 8.5–10.1)
CHLORIDE SERPL-SCNC: 102 MMOL/L (ref 97–108)
CO2 SERPL-SCNC: 28 MMOL/L (ref 21–32)
CREAT SERPL-MCNC: 0.88 MG/DL (ref 0.7–1.3)
DIFFERENTIAL METHOD BLD: ABNORMAL
EOSINOPHIL # BLD: 0.2 K/UL (ref 0–0.4)
EOSINOPHIL NFR BLD: 2 % (ref 0–7)
ERYTHROCYTE [DISTWIDTH] IN BLOOD BY AUTOMATED COUNT: 15.5 % (ref 11.5–14.5)
GLUCOSE SERPL-MCNC: 144 MG/DL (ref 65–100)
HCT VFR BLD AUTO: 26.5 % (ref 36.6–50.3)
HGB BLD-MCNC: 8.4 G/DL (ref 12.1–17)
IMM GRANULOCYTES # BLD AUTO: 0.1 K/UL (ref 0–0.04)
IMM GRANULOCYTES NFR BLD AUTO: 1 % (ref 0–0.5)
LYMPHOCYTES # BLD: 1.1 K/UL (ref 0.8–3.5)
LYMPHOCYTES NFR BLD: 10 % (ref 12–49)
MCH RBC QN AUTO: 28.2 PG (ref 26–34)
MCHC RBC AUTO-ENTMCNC: 31.7 G/DL (ref 30–36.5)
MCV RBC AUTO: 88.9 FL (ref 80–99)
MONOCYTES # BLD: 1 K/UL (ref 0–1)
MONOCYTES NFR BLD: 9 % (ref 5–13)
NEUTS SEG # BLD: 8.6 K/UL (ref 1.8–8)
NEUTS SEG NFR BLD: 78 % (ref 32–75)
NRBC # BLD: 0 K/UL (ref 0–0.01)
NRBC BLD-RTO: 0 PER 100 WBC
PLATELET # BLD AUTO: 431 K/UL (ref 150–400)
PMV BLD AUTO: 8.4 FL (ref 8.9–12.9)
POTASSIUM SERPL-SCNC: 4.1 MMOL/L (ref 3.5–5.1)
RBC # BLD AUTO: 2.98 M/UL (ref 4.1–5.7)
SODIUM SERPL-SCNC: 135 MMOL/L (ref 136–145)
WBC # BLD AUTO: 11 K/UL (ref 4.1–11.1)

## 2021-10-22 PROCEDURE — 65270000029 HC RM PRIVATE

## 2021-10-22 PROCEDURE — 74011250636 HC RX REV CODE- 250/636: Performed by: SURGERY

## 2021-10-22 PROCEDURE — 74011250637 HC RX REV CODE- 250/637: Performed by: SURGERY

## 2021-10-22 PROCEDURE — 36415 COLL VENOUS BLD VENIPUNCTURE: CPT

## 2021-10-22 PROCEDURE — C9113 INJ PANTOPRAZOLE SODIUM, VIA: HCPCS | Performed by: NURSE PRACTITIONER

## 2021-10-22 PROCEDURE — 74011250637 HC RX REV CODE- 250/637: Performed by: NURSE PRACTITIONER

## 2021-10-22 PROCEDURE — 80048 BASIC METABOLIC PNL TOTAL CA: CPT

## 2021-10-22 PROCEDURE — 74011000250 HC RX REV CODE- 250: Performed by: NURSE PRACTITIONER

## 2021-10-22 PROCEDURE — 74011000258 HC RX REV CODE- 258: Performed by: SURGERY

## 2021-10-22 PROCEDURE — 74011250637 HC RX REV CODE- 250/637: Performed by: HOSPITALIST

## 2021-10-22 PROCEDURE — 85025 COMPLETE CBC W/AUTO DIFF WBC: CPT

## 2021-10-22 PROCEDURE — 74011250636 HC RX REV CODE- 250/636: Performed by: NURSE PRACTITIONER

## 2021-10-22 PROCEDURE — 74011250637 HC RX REV CODE- 250/637: Performed by: INTERNAL MEDICINE

## 2021-10-22 RX ORDER — TRAZODONE HYDROCHLORIDE 50 MG/1
50 TABLET ORAL
Status: DISCONTINUED | OUTPATIENT
Start: 2021-10-22 | End: 2021-10-23

## 2021-10-22 RX ADMIN — Medication 10000 UNITS: at 21:02

## 2021-10-22 RX ADMIN — Medication 10000 UNITS: at 10:46

## 2021-10-22 RX ADMIN — TRAZODONE HYDROCHLORIDE 50 MG: 50 TABLET ORAL at 21:02

## 2021-10-22 RX ADMIN — VITAMIN E CAP 400 UNIT 400 UNITS: 400 CAP at 10:45

## 2021-10-22 RX ADMIN — PIPERACILLIN AND TAZOBACTAM 3.38 G: 3; .375 INJECTION, POWDER, LYOPHILIZED, FOR SOLUTION INTRAVENOUS at 12:02

## 2021-10-22 RX ADMIN — PANCRELIPASE 2 CAPSULE: 60000; 12000; 38000 CAPSULE, DELAYED RELEASE PELLETS ORAL at 16:48

## 2021-10-22 RX ADMIN — VITAMIN E CAP 400 UNIT 400 UNITS: 400 CAP at 21:02

## 2021-10-22 RX ADMIN — FLUOXETINE 40 MG: 20 CAPSULE ORAL at 10:46

## 2021-10-22 RX ADMIN — Medication 10 ML: at 22:00

## 2021-10-22 RX ADMIN — PIPERACILLIN AND TAZOBACTAM 3.38 G: 3; .375 INJECTION, POWDER, LYOPHILIZED, FOR SOLUTION INTRAVENOUS at 02:58

## 2021-10-22 RX ADMIN — Medication 100 MG: at 10:46

## 2021-10-22 RX ADMIN — SODIUM CHLORIDE 40 MG: 9 INJECTION INTRAMUSCULAR; INTRAVENOUS; SUBCUTANEOUS at 12:02

## 2021-10-22 RX ADMIN — HYDROMORPHONE HYDROCHLORIDE 2 MG: 2 TABLET ORAL at 02:58

## 2021-10-22 RX ADMIN — Medication 10000 UNITS: at 18:55

## 2021-10-22 RX ADMIN — Medication 10000 UNITS: at 12:51

## 2021-10-22 RX ADMIN — VITAMIN E CAP 400 UNIT 400 UNITS: 400 CAP at 12:52

## 2021-10-22 RX ADMIN — VITAMIN E CAP 400 UNIT 400 UNITS: 400 CAP at 18:55

## 2021-10-22 RX ADMIN — PANCRELIPASE 2 CAPSULE: 60000; 12000; 38000 CAPSULE, DELAYED RELEASE PELLETS ORAL at 11:46

## 2021-10-22 NOTE — PROGRESS NOTES
Patients daughter expressed concerns that her fathers mental status had changed. Daughter stated, \"He thinks that black thing on the ceiling is cleaning the tiles, he seems more shaky, and he seems to be having a conversation with no one in particular\". This RN notified Lucho Benz MD who asked I put in a hospital consult for the new neurological change. RN did notice the patient talking to himself in the room. When I assessed him he answered questions appropriately and could carry a conversation.

## 2021-10-22 NOTE — PROGRESS NOTES
NUTRITION brief  Recommendations:   1. Encouragement with po   - family may bring foods from home   - document % meals consumed        Diet: low fat  Supplements/Nutrition Support: none  Nutrition-related meds: prozac, Creon 24052 (2 capsules TID), protonix, zosyn, thiamine, trazadone, vitamin A (24944 units QID), vit E (400 units QID), D5 1/2 NaCl w/ KCl @ 50ml/hr; PRN: zofran, miralax  Meal Intake:   Patient Vitals for the past 168 hrs:   % Diet Eaten   10/20/21 1900 26 - 50%   10/20/21 1300 1 - 25%   10/19/21 0945 0%   10/15/21 1314 76 - 100%   Supplement Intake:  Patient Vitals for the past 168 hrs:   Supplement intake %   10/16/21 0956 76 - 100%   10/15/21 1314 76 - 100%     New events impacting nutrition plan of care: Pt visited today for PO check. Some disorientation overnight noted, likely d/t meds and prolonged admit per MD notes. Pt reports minimal po intake yesterday d/t pain but ate 1/2 sandwich on Wednesday. Ensure Clear discontinued by provider. Discussed trial of Magic Cup as alternative supplement for 580kcal, 18g protein. Lunch order entered for pt and menu provided. Hopes for d/c over next couple of days per surgeon but would recommend referral to outpatient RD on discharge to monitor nutrition status. Outpatient RD contact info added to d/c paperwork. See full RD assessment from 10/19 for additional details, goals, and monitoring/evaluation. Estimated Nutrition Needs:   Energy: 1977-2157kcal (MSJ x 1.2 x 1.1-1.2)  Wt used: Other (specify) (68kg)  Protein: 95-109g (1.4-1.6g/kg)  Wt used:  Other (specify) (68kg)   Fluid: 2100     Des Chauhan RD [facility], Contact via [employer]

## 2021-10-22 NOTE — CONSULTS
[address] [facility] Adult  Hospitalist Group    Hospitalist Consult  Primary Care Provider: Maryam Milligan MD  Consult requested by: Patient and wife     Subjective:     Mitali Pugh. is a 77 y.o. male with Mhx s/p choledochojejunostomy d/t stricture of CBD from chronic pancreatitis was admitted on 10/10/2021 and underwent laparoscopic with possible open drainage of the pancreatic abscess, and is on antibiotic for sepsis was noted to have intermittent confusion and hallucination by this family. Symptom started yesterday. According to family report patient was acting differently. He was seeing things in the ceiling and conversing with no one in particular. During my evaluation, patient stated he was not having a good night sleep. He slept only 2-3 hours a night before, c/o some discomfort otherwise no significant pain. He is alert and oriented X 4, able to converse appropriately. He is on dilaudid for pain. Otherwise no other significant speech, swallow or lateralizing weakness noted. Review of Systems:    Review of Systems   Constitutional: Negative for activity change, appetite change and fatigue. HENT: Negative for ear pain, facial swelling, sore throat and trouble swallowing.    Eyes: No visual disturbance. Negative for pain and discharge. Respiratory: Negative for chest tightness, shortness of breath and wheezing.    Cardiovascular: Negative for chest pain and palpitations. Gastrointestinal: Negative for abdominal pain, blood in stool, nausea and vomiting. Genitourinary: Negative for difficulty urinating, flank pain and hematuria. Musculoskeletal: Negative for arthralgias, joint swelling, myalgias and neck pain. Skin: Negative for color change and rash. Neurological: Negative for  dizziness, headache, weakness or numbness. Hematological: Negative for adenopathy. Does not bruise/bleed easily.    Psychiatric/Behavioral: Negative for behavioral problems, confusion and sleep disturbance.   All other systems reviewed and are negative. Past Medical History:   Diagnosis Date    Adverse effect of anesthesia     woke up during ERCP - gasping    Bacteremia 08/2014    Bacteremia: 2/3 bottles with Klebsiella Oxytoca, pansusceptible and Streptococcus species    Bile duct stricture 2016    s/p choledochojejunostomy on 2/1/2016 for biliary stricture    Chronic pancreatitis ([address]     Depression     Dilated pancreatic duct     CT of the abdomen/pelvis on 8/31/2018 showed persistent pancreatic atrophy and pancreatic ductal dilatation to 1 cm. Punctate calcifications in the pancreatic head are unchanged. New 1.5 x 1.4 x 1.4 cm hypodense structure in the inferior medial pancreatic head suggesting a cyst    GERD (gastroesophageal reflux disease)     Thompson's Esophagus    Insomnia     Pancreatic cyst     CT of the abdomen/pelvis on 8/31/2018 showed persistent pancreatic atrophy and pancreatic ductal dilatation to 1 cm. Punctate calcifications in the pancreatic head are unchanged.  New 1.5 x 1.4 x 1.4 cm hypodense structure in the inferior medial pancreatic head       Past Surgical History:   Procedure Laterality Date    HX CHOLECYSTECTOMY  02/01/2016    Cholecystectomy and choledochojejunostomy for biliary stricture on 2/1/2016; also had removal of  inlying metal stent for CBD stricture, by Dr Vern Weston      Age 2, had left inguinal hernia repair    HX ORTHOPAEDIC      ganglion cyst removed left wrist x 2    HX ORTHOPAEDIC      left knee fracture 2013-1/30    HX ORTHOPAEDIC      meniscus repair (2) on left knee    HX OTHER SURGICAL      ERCPS -multiple stents placement and removal since 1995 most due to biliary stricture; last ERCP with stent 4/2015    HX SMALL BOWEL RESECTION  02/09/2016    1 week after Cholecystectomy and choledochojejunostomy, small-bowel obstruction secondary to adhesions and necrosis of the most proximal portion of the alimentary portion of Frankie limb and jejunojejunostomy. Patient had ex lap with  Resection of the necrotic proximal small bowel and oversewing of the enteroenterotomy closure.  HX TONSILLECTOMY      VASCULAR SURGERY PROCEDURE UNLIST      vein stripped from left leg     Prior to Admission medications    Medication Sig Start Date End Date Taking? Authorizing Provider   aspirin delayed-release 81 mg tablet Take  by mouth daily. Provider, Historical   LIPASE/PROTEASE/AMYLASE (CREON PO) Take  by mouth. 60125 units po with a small meal.    Provider, Historical   LIPASE/PROTEASE/AMYLASE (CREON PO) Take  by mouth. 22099 units po with a snack. Provider, Historical   omeprazole (PRILOSEC) 20 mg capsule Take 20 mg by mouth daily. Provider, Historical   FLUoxetine (PROZAC) 20 mg tablet Take 20 mg by mouth daily. Provider, Historical   LIPASE/PROTEASE/AMYLASE (CREON PO) Take  by mouth. 85386 units po with large meal.    Provider, Historical     Allergies   Allergen Reactions    Aspirin Other (comments)     Does not take d/t chronic pancreatitis. Pt has begun to take  81 mg ASA due to 'blockages' in his heart. Family History   Problem Relation Age of Onset    Stroke Mother         ?  Stroke Father     Other Brother         Agent Orange    Stroke Brother     Alcohol abuse Maternal Uncle     Anesth Problems Neg Hx         SOCIAL HISTORY:    Objective:       Physical Exam:   GEN: awake, chronically sick looking, not in acute cardiopulm distress  Chest: Normal chest wall and respirations. Clear to auscultation. CVS: S1/S2 well heard  ABD: soft, mild tenderness epigastric, CLARKE drain with scant blood tinged drainage. CNS: Cr ll-Xll intact, strength 5/5 bother upper and lower ext, gait appropriate    Data Review: All diagnostic labs and studies have been reviewed.     Chest x-ray     Assessment and Plan      # Intermittent hallucination possible hospital delirium vs sleep deprivation   - Avoid sedative medication, including excessive pain medication use    - adjust sleep wake cycle.  Trial of trazodone early in the evening for a good night sleep  ( melatonin is not working for patient)  - if symptom worse then consider CT/MRI brain      Pancreatic abscess s/p laparoscopic drainage 10/14  Mgmt per surgery, now primary     Hypophos, repleted   Hypokalemia: repleted  Hypomagnesemia: repleted     Anemia: improved/stable, mgmt per primary     Depression: home fluoxetine      Severe biliary strictures, s/p choledochojejunostomy on 2/1/2016     Hyperglycemia, likely 2/2 steroid injection, a1c 6.1, preDM      Signed By: Ileana Owens MD     Date of Service:  10/22/2021

## 2021-10-22 NOTE — PROGRESS NOTES
[facility] Adult  Hospitalist Group                                                                                          Hospitalist Progress Note  Maria Luisa Benedict MD  Answering service: [phone] from in house phone        Date of Service:  10/22/2021  NAME:  Martin Adair. :  1955  MRN:  [id number]    This documentation was facilitated by a Voice Recognition software and may contain inadvertent typographical errors. Admission Summary:     Martin Adair. is a 77 y.o. male with Mhx s/p choledochojejunostomy d/t stricture of CBD from chronic pancreatitis was admitted on 10/10/2021 and underwent laparoscopic with possible open drainage of the pancreatic abscess, and is on antibiotic for sepsis was noted to have intermittent confusion and hallucination by this family. Symptom started yesterday. According to family report patient was acting differently. He was seeing things in the ceiling and conversing with no one in particular. During my evaluation, patient stated he was not having a good night sleep. He slept only 2-3 hours a night before, c/o some discomfort otherwise no significant pain. He is alert and oriented X 4, able to converse appropriately. He is on dilaudid for pain. Otherwise no other significant speech, swallow or lateralizing weakness noted. Interval history / Subjective:        Patient seen and examined this morning. He is alert and oriented x4. Discussed with his wife who said he has been hallucinating for the last 2 days. Patient downplays the symptom however he admits he is sleep deprived that may be why he is experiencing this. As needed trazodone was ordered by my colleague yesterday, patient did not receive it, he received Benadryl rather  Assessment & Plan:   # Intermittent hallucination possible hospital delirium vs sleep deprivation   --Exam is nonfocal.  Medications reviewed.   Discontinue scopolamine patch, discussed with surgical team who are okay. --Sleep hygiene. Added trazodone. With antihistamines, anticholinergics,discussed with RN to avoid  --We will reassess tomorrow and if hypertension persists this, may consider brain imaging with CT or MRI      Pancreatic abscess s/p laparoscopic drainage 10/14  Mgmt per surgery, now primary     Hypophos, repleted   Hypokalemia: repleted  Hypomagnesemia: repleted     Anemia: improved/stable, mgmt per primary     Depression: home fluoxetine      Severe biliary strictures, s/p choledochojejunostomy on 2/1/2016     Hyperglycemia, likely 2/2 steroid injection, a1c 6.1, preDM     Hospital Problems  Date Reviewed: 11/11/2020        Codes Class Noted POA    Severe protein-calorie malnutrition ([facility] [address] ICD-10-CM: A11  ICD-9-CM: 942  10/13/2021 Yes        * (Principal) Sepsis ([facility] [address] ICD-10-CM: A41.9  ICD-9-CM: 038.9, 995.91  10/10/2021 Unknown                Review of Systems:   A comprehensive review of systems was negative except for that written in the HPI. Vital Signs:    Last 24hrs VS reviewed since prior progress note. Most recent are:  Visit Vitals  BP (!) 146/82   Pulse 94   Temp 98.1 °F (36.7 °C)   Resp 16   Ht 6' (1.829 m)   Wt 73 kg (161 lb)   SpO2 97%   BMI 21.84 kg/m²         Intake/Output Summary (Last 24 hours) at 10/22/2021 1856  Last data filed at 10/22/2021 0908  Gross per 24 hour   Intake    Output 1550 ml   Net -1550 ml        Physical Examination:     I had a face to face encounter with this patient and independently examined them on10/22/2021 as outlined below:        Constitutional:  No acute distress, cooperative, pleasant    HEENT:  Atraumatic. Oral mucosa moist,. Non icteric sclera. No pallor. Resp:  CTA bilaterally. No wheezing/rhonchi/rales. No accessory muscle use   Chest Wall: No deformity   CV:  Regular rhythm, normal rate, no murmurs, gallops, rubs    GI:  Soft, non distended, non tender.  normoactive bowel sounds, no hepatosplenomegaly    :  No CVA or suprapubic tenderness    Musculoskeletal:  No edema, warm, 2+ pulses throughout    Neurologic:  Mental status:AAOx3,   Cranial nerves II-XII : WNL  Motor exam:Moves all extremities symmetrically              Data Review:    Review and/or order of clinical lab test  Review and/or order of tests in the radiology section of CPT  Review and/or order of tests in the medicine section of [facility]      Labs:     Recent Labs     10/22/21  0249 10/21/21  0539   WBC 11.0 9.0   HGB 8.4* 7.7*   HCT 26.5* 23.3*   * 420*     Recent Labs     10/22/21  0249 10/21/21  0539 10/20/21  0356   * 136 133*   K 4.1 3.8 4.0    102 100   CO2 28 30 30   BUN 4* 4* 5*   CREA 0.88 0.84 0.83   * 171* 117*   CA 9.0 8.5 8.5   MG  --   --  1.8   PHOS  --   --  2.6     No results for input(s): ALT, AP, TBIL, TBILI, TP, ALB, GLOB, GGT, AML, LPSE in the last 72 hours. No lab exists for component: SGOT, GPT, AMYP, HLPSE  No results for input(s): INR, PTP, APTT, INREXT in the last 72 hours. No results for input(s): FE, TIBC, PSAT, FERR in the last 72 hours. No results found for: FOL, RBCF   No results for input(s): PH, PCO2, PO2 in the last 72 hours. No results for input(s): CPK, CKNDX, TROIQ in the last 72 hours.     No lab exists for component: CPKMB  No results found for: CHOL, CHOLX, CHLST, CHOLV, HDL, HDLP, LDL, LDLC, DLDLP, TGLX, TRIGL, TRIGP, CHHD, CHHDX  Lab Results   Component Value Date/Time    Glucose (POC) 125 (H) 10/20/2021 09:21 PM    Glucose (POC) 77 10/14/2021 12:14 PM    Glucose (POC) 102 (H) 02/20/2016 06:17 AM    Glucose (POC) 136 (H) 02/19/2016 10:57 PM    Glucose (POC) 116 (H) 02/19/2016 04:44 PM     Lab Results   Component Value Date/Time    Color DARK YELLOW 02/08/2016 08:22 PM    Appearance CLOUDY (A) 02/08/2016 08:22 PM    Specific gravity 1.026 02/08/2016 08:22 PM    pH (UA) 5.5 02/08/2016 08:22 PM    Protein 100 (A) 02/08/2016 08:22 PM    Glucose NEGATIVE  02/08/2016 08:22 PM    Ketone 40 (A) 02/08/2016 08:22 PM Bilirubin LARGE (A) 08/16/2014 04:35 AM    Urobilinogen 1.0 02/08/2016 08:22 PM    Nitrites NEGATIVE  02/08/2016 08:22 PM    Leukocyte Esterase SMALL (A) 02/08/2016 08:22 PM    Epithelial cells FEW 02/08/2016 08:22 PM    Bacteria NEGATIVE  02/08/2016 08:22 PM    WBC 10-20 02/08/2016 08:22 PM    RBC 0-5 02/08/2016 08:22 PM         Medications Reviewed:     Current Facility-Administered Medications   Medication Dose Route Frequency    traZODone (DESYREL) tablet 50 mg  50 mg Oral QHS    dextrose 5% - 0.45% NaCl with KCl 20 mEq/L infusion  50 mL/hr IntraVENous CONTINUOUS    lipase-protease-amylase (CREON 12,000) capsule 2 Capsule  2 Capsule Oral TIDAC    0.9% sodium chloride infusion 250 mL  250 mL IntraVENous PRN    thiamine HCL (B-1) tablet 100 mg  100 mg Oral DAILY    vitamin A (AQUASOL A) capsule 10,000 Units  10,000 Units Oral QID    vitamin E acetate capsule 400 Units  400 Units Oral QID    HYDROmorphone (DILAUDID) tablet 2 mg  2 mg Oral Q3H PRN    HYDROmorphone (DILAUDID) injection 1 mg  1 mg IntraVENous Q4H PRN    diphenhydrAMINE (BENADRYL) injection 12.5 mg  12.5 mg IntraVENous Q6H PRN    benzocaine-menthoL (CEPACOL) lozenge 1 Lozenge  1 Lozenge Mucous Membrane PRN    0.9% sodium chloride infusion 250 mL  250 mL IntraVENous PRN    sodium chloride (NS) flush 5-40 mL  5-40 mL IntraVENous Q8H    sodium chloride (NS) flush 5-40 mL  5-40 mL IntraVENous PRN    FLUoxetine (PROzac) capsule 40 mg  40 mg Oral DAILY    pantoprazole (PROTONIX) 40 mg in 0.9% sodium chloride 10 mL injection  40 mg IntraVENous DAILY    piperacillin-tazobactam (ZOSYN) 3.375 g in 0.9% sodium chloride (MBP/ADV) 100 mL MBP  3.375 g IntraVENous Q8H    sodium chloride (NS) flush 5-40 mL  5-40 mL IntraVENous Q8H    sodium chloride (NS) flush 5-40 mL  5-40 mL IntraVENous PRN    acetaminophen (TYLENOL) tablet 650 mg  650 mg Oral Q6H PRN    Or    acetaminophen (TYLENOL) suppository 650 mg  650 mg Rectal Q6H PRN    polyethylene glycol (MIRALAX) packet 17 g  17 g Oral DAILY PRN    ondansetron (ZOFRAN ODT) tablet 4 mg  4 mg Oral Q8H PRN    Or    ondansetron (ZOFRAN) injection 4 mg  4 mg IntraVENous Q6H PRN    [Held by provider] enoxaparin (LOVENOX) injection 40 mg  40 mg SubCUTAneous DAILY     ______________________________________________________________________  EXPECTED LENGTH OF STAY: 9d 14h  ACTUAL LENGTH OF STAY:          12                 Aranza Dobson MD

## 2021-10-22 NOTE — PROGRESS NOTES
Bedside shift change report given to Marky Claros (oncoming nurse) by Jim Turner RN (offgoing nurse). Report included the following information SBAR, Kardex, Intake/Output, MAR and Recent Results.

## 2021-10-22 NOTE — PROGRESS NOTES
Progress Note    Patient: Mikey Vasquez. MRN: [id number]  SSN: [id number]    YOB: 1955  Age: 77 y.o. Sex: male      Admit Date: 10/10/2021    8 Days Post-Op    Procedure:  Procedure(s):  LAPAROSCOPIC DRAINAGE OF PANCREATIC ABCESS    Subjective:     Patient has complaints of pain and general feeling of being abandoned by the nurses. His family noted some disorientation last night. Most likely due to meds and prolonged hospitalization. With any luck should be able to go home on PO antibiotics in several days. .     Objective:     Visit Vitals  BP (!) 167/86   Pulse 94   Temp 98.7 °F (37.1 °C)   Resp 16   Ht 6' (1.829 m)   Wt 161 lb (73 kg)   SpO2 96%   BMI 21.84 kg/m²       Temp (24hrs), Av.4 °F (36.9 °C), Min:98 °F (36.7 °C), Max:98.8 °F (37.1 °C)      Physical Exam:    GENERAL: alert, cooperative, appears stated age, agitated, ABDOMEN: soft, non-tender. Bowel sounds normal. No masses,  no organomegaly        Lab Review: All lab results for the last 24 hours reviewed. Assessment:     Hospital Problems  Date Reviewed: 2020        Codes Class Noted POA    Severe protein-calorie malnutrition ([facility] [address] ICD-10-CM: W20  ICD-9-CM: 903  10/13/2021 Yes        * (Principal) Sepsis ([facility] [address] ICD-10-CM: A41.9  ICD-9-CM: 038.9, 995.91  10/10/2021 Unknown              Plan/Recommendations/Medical Decision Making:     Continue present treatment  remove CLARKE drain today.

## 2021-10-23 LAB
ALBUMIN SERPL-MCNC: 2.3 G/DL (ref 3.5–5)
ALBUMIN/GLOB SERPL: 0.5 {RATIO} (ref 1.1–2.2)
ALP SERPL-CCNC: 99 U/L (ref 45–117)
ALT SERPL-CCNC: 17 U/L (ref 12–78)
AMMONIA PLAS-SCNC: 40 UMOL/L
ANION GAP SERPL CALC-SCNC: 7 MMOL/L (ref 5–15)
AST SERPL-CCNC: 22 U/L (ref 15–37)
BASOPHILS # BLD: 0 K/UL (ref 0–0.1)
BASOPHILS NFR BLD: 0 % (ref 0–1)
BILIRUB SERPL-MCNC: 0.6 MG/DL (ref 0.2–1)
BUN SERPL-MCNC: 5 MG/DL (ref 6–20)
BUN/CREAT SERPL: 6 (ref 12–20)
CALCIUM SERPL-MCNC: 8.9 MG/DL (ref 8.5–10.1)
CHLORIDE SERPL-SCNC: 102 MMOL/L (ref 97–108)
CO2 SERPL-SCNC: 27 MMOL/L (ref 21–32)
CREAT SERPL-MCNC: 0.9 MG/DL (ref 0.7–1.3)
DIFFERENTIAL METHOD BLD: ABNORMAL
EOSINOPHIL # BLD: 0.2 K/UL (ref 0–0.4)
EOSINOPHIL NFR BLD: 3 % (ref 0–7)
ERYTHROCYTE [DISTWIDTH] IN BLOOD BY AUTOMATED COUNT: 15.2 % (ref 11.5–14.5)
GLOBULIN SER CALC-MCNC: 4.9 G/DL (ref 2–4)
GLUCOSE SERPL-MCNC: 115 MG/DL (ref 65–100)
HCT VFR BLD AUTO: 28 % (ref 36.6–50.3)
HGB BLD-MCNC: 9 G/DL (ref 12.1–17)
IMM GRANULOCYTES # BLD AUTO: 0.1 K/UL (ref 0–0.04)
IMM GRANULOCYTES NFR BLD AUTO: 1 % (ref 0–0.5)
LYMPHOCYTES # BLD: 1.4 K/UL (ref 0.8–3.5)
LYMPHOCYTES NFR BLD: 15 % (ref 12–49)
MCH RBC QN AUTO: 27.8 PG (ref 26–34)
MCHC RBC AUTO-ENTMCNC: 32.1 G/DL (ref 30–36.5)
MCV RBC AUTO: 86.4 FL (ref 80–99)
MONOCYTES # BLD: 1 K/UL (ref 0–1)
MONOCYTES NFR BLD: 11 % (ref 5–13)
NEUTS SEG # BLD: 6.4 K/UL (ref 1.8–8)
NEUTS SEG NFR BLD: 70 % (ref 32–75)
NRBC # BLD: 0 K/UL (ref 0–0.01)
NRBC BLD-RTO: 0 PER 100 WBC
PLATELET # BLD AUTO: 487 K/UL (ref 150–400)
PMV BLD AUTO: 8.2 FL (ref 8.9–12.9)
POTASSIUM SERPL-SCNC: 4.2 MMOL/L (ref 3.5–5.1)
PROT SERPL-MCNC: 7.2 G/DL (ref 6.4–8.2)
RBC # BLD AUTO: 3.24 M/UL (ref 4.1–5.7)
SODIUM SERPL-SCNC: 136 MMOL/L (ref 136–145)
WBC # BLD AUTO: 9.1 K/UL (ref 4.1–11.1)

## 2021-10-23 PROCEDURE — 36415 COLL VENOUS BLD VENIPUNCTURE: CPT

## 2021-10-23 PROCEDURE — 74011250636 HC RX REV CODE- 250/636: Performed by: SURGERY

## 2021-10-23 PROCEDURE — 74011000258 HC RX REV CODE- 258: Performed by: SURGERY

## 2021-10-23 PROCEDURE — 80053 COMPREHEN METABOLIC PANEL: CPT

## 2021-10-23 PROCEDURE — 74011250637 HC RX REV CODE- 250/637: Performed by: NURSE PRACTITIONER

## 2021-10-23 PROCEDURE — 74011250637 HC RX REV CODE- 250/637: Performed by: SURGERY

## 2021-10-23 PROCEDURE — C9113 INJ PANTOPRAZOLE SODIUM, VIA: HCPCS | Performed by: NURSE PRACTITIONER

## 2021-10-23 PROCEDURE — 85025 COMPLETE CBC W/AUTO DIFF WBC: CPT

## 2021-10-23 PROCEDURE — 74011250636 HC RX REV CODE- 250/636: Performed by: NURSE PRACTITIONER

## 2021-10-23 PROCEDURE — 74011250637 HC RX REV CODE- 250/637: Performed by: HOSPITALIST

## 2021-10-23 PROCEDURE — 99024 POSTOP FOLLOW-UP VISIT: CPT | Performed by: SURGERY

## 2021-10-23 PROCEDURE — 82140 ASSAY OF AMMONIA: CPT

## 2021-10-23 PROCEDURE — 65270000029 HC RM PRIVATE

## 2021-10-23 PROCEDURE — 94760 N-INVAS EAR/PLS OXIMETRY 1: CPT

## 2021-10-23 PROCEDURE — 74011250637 HC RX REV CODE- 250/637: Performed by: INTERNAL MEDICINE

## 2021-10-23 PROCEDURE — 74011000250 HC RX REV CODE- 250: Performed by: NURSE PRACTITIONER

## 2021-10-23 RX ORDER — LORAZEPAM 2 MG/ML
1 INJECTION INTRAMUSCULAR ONCE
Status: COMPLETED | OUTPATIENT
Start: 2021-10-24 | End: 2021-10-24

## 2021-10-23 RX ORDER — HALOPERIDOL 5 MG/ML
3 INJECTION INTRAMUSCULAR ONCE
Status: COMPLETED | OUTPATIENT
Start: 2021-10-24 | End: 2021-10-23

## 2021-10-23 RX ORDER — TRAZODONE HYDROCHLORIDE 100 MG/1
100 TABLET ORAL
Status: DISCONTINUED | OUTPATIENT
Start: 2021-10-23 | End: 2021-10-27 | Stop reason: HOSPADM

## 2021-10-23 RX ADMIN — PIPERACILLIN AND TAZOBACTAM 3.38 G: 3; .375 INJECTION, POWDER, LYOPHILIZED, FOR SOLUTION INTRAVENOUS at 18:04

## 2021-10-23 RX ADMIN — VITAMIN E CAP 400 UNIT 400 UNITS: 400 CAP at 17:18

## 2021-10-23 RX ADMIN — VITAMIN E CAP 400 UNIT 400 UNITS: 400 CAP at 09:07

## 2021-10-23 RX ADMIN — VITAMIN E CAP 400 UNIT 400 UNITS: 400 CAP at 12:02

## 2021-10-23 RX ADMIN — Medication 10000 UNITS: at 17:18

## 2021-10-23 RX ADMIN — Medication 10 ML: at 06:00

## 2021-10-23 RX ADMIN — VITAMIN E CAP 400 UNIT 400 UNITS: 400 CAP at 21:45

## 2021-10-23 RX ADMIN — PIPERACILLIN AND TAZOBACTAM 3.38 G: 3; .375 INJECTION, POWDER, LYOPHILIZED, FOR SOLUTION INTRAVENOUS at 12:02

## 2021-10-23 RX ADMIN — PANCRELIPASE 2 CAPSULE: 60000; 12000; 38000 CAPSULE, DELAYED RELEASE PELLETS ORAL at 12:02

## 2021-10-23 RX ADMIN — Medication 10000 UNITS: at 12:02

## 2021-10-23 RX ADMIN — Medication 10000 UNITS: at 21:45

## 2021-10-23 RX ADMIN — PANCRELIPASE 2 CAPSULE: 60000; 12000; 38000 CAPSULE, DELAYED RELEASE PELLETS ORAL at 17:18

## 2021-10-23 RX ADMIN — Medication 100 MG: at 09:07

## 2021-10-23 RX ADMIN — TRAZODONE HYDROCHLORIDE 100 MG: 100 TABLET ORAL at 21:45

## 2021-10-23 RX ADMIN — PIPERACILLIN AND TAZOBACTAM 3.38 G: 3; .375 INJECTION, POWDER, LYOPHILIZED, FOR SOLUTION INTRAVENOUS at 02:24

## 2021-10-23 RX ADMIN — SODIUM CHLORIDE 1000 ML: 9 INJECTION, SOLUTION INTRAVENOUS at 22:48

## 2021-10-23 RX ADMIN — Medication 10 ML: at 14:00

## 2021-10-23 RX ADMIN — Medication 10000 UNITS: at 09:07

## 2021-10-23 RX ADMIN — ACETAMINOPHEN 650 MG: 325 TABLET ORAL at 19:01

## 2021-10-23 RX ADMIN — HALOPERIDOL LACTATE 3 MG: 5 INJECTION, SOLUTION INTRAMUSCULAR at 23:21

## 2021-10-23 RX ADMIN — FLUOXETINE 40 MG: 20 CAPSULE ORAL at 09:07

## 2021-10-23 RX ADMIN — SODIUM CHLORIDE 40 MG: 9 INJECTION INTRAMUSCULAR; INTRAVENOUS; SUBCUTANEOUS at 09:07

## 2021-10-23 NOTE — PROGRESS NOTES
Bedside and Verbal shift change report given to Jose Gonzalez RN  (oncoming nurse) by Heath Stover (offgoing nurse). Report included the following information SBAR and Kardex. [facility] # [id number]  LOC [id number]

## 2021-10-23 NOTE — PROGRESS NOTES
[facility] [facility] Adult  Hospitalist Group                                                                                          Hospitalist Progress Note  Monica Mishra MD  Answering service: [phone] from in house phone        Date of Service:  10/23/2021  NAME:  Pernell Esteves. :  1955  MRN:  [id number]    This documentation was facilitated by a Voice Recognition software and may contain inadvertent typographical errors. Admission Summary:     Pernell Blount is a 77 y.o. male with Mhx s/p choledochojejunostomy d/t stricture of CBD from chronic pancreatitis was admitted on 10/10/2021 and underwent laparoscopic with possible open drainage of the pancreatic abscess, and is on antibiotic for sepsis was noted to have intermittent confusion and hallucination by this family. Symptom started yesterday. According to family report patient was acting differently. He was seeing things in the ceiling and conversing with no one in particular. During my evaluation, patient stated he was not having a good night sleep. He slept only 2-3 hours a night before, c/o some discomfort otherwise no significant pain. He is alert and oriented X 4, able to converse appropriately. He is on dilaudid for pain. Otherwise no other significant speech, swallow or lateralizing weakness noted. Interval history / Subjective:        Patient voices feeling okay. He did not have a good night rest last night as well despite trying 50 mg trazodone. He denies hallucination. Assessment & Plan:   # Intermittent hallucination possible hospital delirium vs sleep deprivation   --Exam is nonfocal.  Medications reviewed. Discontinue scopolamine patch, discussed with surgical team who are okay. --Sleep hygiene. Increase trazodone.   With antihistamines, anticholinergics,discussed with RN to avoid  --Patient denied hallucination today, 10/23     Pancreatic abscess s/p laparoscopic drainage 10/14  Mgmt per surgery, now primary     Hypophos, repleted   Hypokalemia: repleted  Hypomagnesemia: repleted     Anemia: improved/stable, mgmt per primary     Depression: home fluoxetine      Severe biliary strictures, s/p choledochojejunostomy on 2/1/2016     Hyperglycemia, likely 2/2 steroid injection, a1c 6.1, preDM     Hospital Problems  Date Reviewed: 11/11/2020        Codes Class Noted POA    Severe protein-calorie malnutrition ([facility] ICD-10-CM: E88  ICD-9-CM: 067  10/13/2021 Yes        * (Principal) Sepsis ([facility] [address] ICD-10-CM: A41.9  ICD-9-CM: 038.9, 995.91  10/10/2021 Unknown                Review of Systems:   A comprehensive review of systems was negative except for that written in the HPI. Vital Signs:    Last 24hrs VS reviewed since prior progress note. Most recent are:  Visit Vitals  /69   Pulse (!) 133   Temp 97.4 °F (36.3 °C)   Resp 16   Ht 6' (1.829 m)   Wt 73 kg (161 lb)   SpO2 98%   BMI 21.84 kg/m²       No intake or output data in the 24 hours ending 10/23/21 1408     Physical Examination:     I had a face to face encounter with this patient and independently examined them on10/23/2021 as outlined below:        Constitutional:  No acute distress, cooperative, pleasant    HEENT:  Atraumatic. Oral mucosa moist,. Non icteric sclera. No pallor. Resp:  CTA bilaterally. No wheezing/rhonchi/rales. No accessory muscle use   Chest Wall: No deformity   CV:  Regular rhythm, normal rate, no murmurs, gallops, rubs    GI:  Soft, non distended, non tender.  normoactive bowel sounds, no hepatosplenomegaly    :  No CVA or suprapubic tenderness    Musculoskeletal:  No edema, warm, 2+ pulses throughout    Neurologic:  Mental status:AAOx3,   Cranial nerves II-XII : WNL  Motor exam:Moves all extremities symmetrically              Data Review:    Review and/or order of clinical lab test  Review and/or order of tests in the radiology section of CPT  Review and/or order of tests in the medicine section of CPT      Labs: Recent Labs     10/23/21  0231 10/22/21  0249   WBC 9.1 11.0   HGB 9.0* 8.4*   HCT 28.0* 26.5*   * 431*     Recent Labs     10/23/21  0231 10/22/21  0249 10/21/21  0539    135* 136   K 4.2 4.1 3.8    102 102   CO2 27 28 30   BUN 5* 4* 4*   CREA 0.90 0.88 0.84   * 144* 171*   CA 8.9 9.0 8.5     Recent Labs     10/23/21  0231   ALT 17   AP 99   TBILI 0.6   TP 7.2   ALB 2.3*   GLOB 4.9*     No results for input(s): INR, PTP, APTT, INREXT, INREXT in the last 72 hours. No results for input(s): FE, TIBC, PSAT, FERR in the last 72 hours. No results found for: FOL, RBCF   No results for input(s): PH, PCO2, PO2 in the last 72 hours. No results for input(s): CPK, CKNDX, TROIQ in the last 72 hours.     No lab exists for component: CPKMB  No results found for: CHOL, CHOLX, CHLST, CHOLV, HDL, HDLP, LDL, LDLC, DLDLP, TGLX, TRIGL, TRIGP, CHHD, CHHDX  Lab Results   Component Value Date/Time    Glucose (POC) 125 (H) 10/20/2021 09:21 PM    Glucose (POC) 77 10/14/2021 12:14 PM    Glucose (POC) 102 (H) 02/20/2016 06:17 AM    Glucose (POC) 136 (H) 02/19/2016 10:57 PM    Glucose (POC) 116 (H) 02/19/2016 04:44 PM     Lab Results   Component Value Date/Time    Color DARK YELLOW 02/08/2016 08:22 PM    Appearance CLOUDY (A) 02/08/2016 08:22 PM    Specific gravity 1.026 02/08/2016 08:22 PM    pH (UA) 5.5 02/08/2016 08:22 PM    Protein 100 (A) 02/08/2016 08:22 PM    Glucose NEGATIVE  02/08/2016 08:22 PM    Ketone 40 (A) 02/08/2016 08:22 PM    Bilirubin LARGE (A) 08/16/2014 04:35 AM    Urobilinogen 1.0 02/08/2016 08:22 PM    Nitrites NEGATIVE  02/08/2016 08:22 PM    Leukocyte Esterase SMALL (A) 02/08/2016 08:22 PM    Epithelial cells FEW 02/08/2016 08:22 PM    Bacteria NEGATIVE  02/08/2016 08:22 PM    WBC 10-20 02/08/2016 08:22 PM    RBC 0-5 02/08/2016 08:22 PM         Medications Reviewed:     Current Facility-Administered Medications   Medication Dose Route Frequency    traZODone (DESYREL) tablet 100 mg  100 mg Oral QHS    dextrose 5% - 0.45% NaCl with KCl 20 mEq/L infusion  50 mL/hr IntraVENous CONTINUOUS    lipase-protease-amylase (CREON 12,000) capsule 2 Capsule  2 Capsule Oral TIDAC    0.9% sodium chloride infusion 250 mL  250 mL IntraVENous PRN    thiamine HCL (B-1) tablet 100 mg  100 mg Oral DAILY    vitamin A (AQUASOL A) capsule 10,000 Units  10,000 Units Oral QID    vitamin E acetate capsule 400 Units  400 Units Oral QID    HYDROmorphone (DILAUDID) tablet 2 mg  2 mg Oral Q3H PRN    HYDROmorphone (DILAUDID) injection 1 mg  1 mg IntraVENous Q4H PRN    diphenhydrAMINE (BENADRYL) injection 12.5 mg  12.5 mg IntraVENous Q6H PRN    benzocaine-menthoL (CEPACOL) lozenge 1 Lozenge  1 Lozenge Mucous Membrane PRN    0.9% sodium chloride infusion 250 mL  250 mL IntraVENous PRN    sodium chloride (NS) flush 5-40 mL  5-40 mL IntraVENous Q8H    sodium chloride (NS) flush 5-40 mL  5-40 mL IntraVENous PRN    FLUoxetine (PROzac) capsule 40 mg  40 mg Oral DAILY    pantoprazole (PROTONIX) 40 mg in 0.9% sodium chloride 10 mL injection  40 mg IntraVENous DAILY    piperacillin-tazobactam (ZOSYN) 3.375 g in 0.9% sodium chloride (MBP/ADV) 100 mL MBP  3.375 g IntraVENous Q8H    sodium chloride (NS) flush 5-40 mL  5-40 mL IntraVENous Q8H    sodium chloride (NS) flush 5-40 mL  5-40 mL IntraVENous PRN    acetaminophen (TYLENOL) tablet 650 mg  650 mg Oral Q6H PRN    Or    acetaminophen (TYLENOL) suppository 650 mg  650 mg Rectal Q6H PRN    polyethylene glycol (MIRALAX) packet 17 g  17 g Oral DAILY PRN    ondansetron (ZOFRAN ODT) tablet 4 mg  4 mg Oral Q8H PRN    Or    ondansetron (ZOFRAN) injection 4 mg  4 mg IntraVENous Q6H PRN    [Held by provider] enoxaparin (LOVENOX) injection 40 mg  40 mg SubCUTAneous DAILY     ______________________________________________________________________  EXPECTED LENGTH OF STAY: 9d 14h  ACTUAL LENGTH OF STAY:          13                 Henry Proctor MD

## 2021-10-23 NOTE — PROGRESS NOTES
Progress Note    Patient: Arthur Motta. MRN: [id number]  SSN: [id number]    YOB: 1955  Age: 77 y.o. Sex: male      Admit Date: 10/10/2021    9 Days Post-Op    Procedure:  Procedure(s):  LAPAROSCOPIC POSSIBLE OPEN DRAINAGE OF PANCREATIC ABCESS    Subjective:     Patient good today. He is tolerating a diet with pain if any. Objective:       Temp (24hrs), Av.2 °F (36.8 °C), Min:97.4 °F (36.3 °C), Max:98.7 °F (37.1 °C)      Physical Exam:    General alert in no acute distress  Lungs clear bilaterally  Heart regular rate and rhythm  Abdomen soft nontender nondistended  Hernia reducible    Data Review: images and reports reviewed    Lab Review: All lab results for the last 24 hours reviewed. Recent Results (from the past 24 hour(s))   METABOLIC PANEL, COMPREHENSIVE    Collection Time: 10/23/21  2:31 AM   Result Value Ref Range    Sodium 136 136 - 145 mmol/L    Potassium 4.2 3.5 - 5.1 mmol/L    Chloride 102 97 - 108 mmol/L    CO2 27 21 - 32 mmol/L    Anion gap 7 5 - 15 mmol/L    Glucose 115 (H) 65 - 100 mg/dL    BUN 5 (L) 6 - 20 MG/DL    Creatinine 0.90 0.70 - 1.30 MG/DL    BUN/Creatinine ratio 6 (L) 12 - 20      GFR est AA >60 >60 ml/min/1.73m2    GFR est non-AA >60 >60 ml/min/1.73m2    Calcium 8.9 8.5 - 10.1 MG/DL    Bilirubin, total 0.6 0.2 - 1.0 MG/DL    ALT (SGPT) 17 12 - 78 U/L    AST (SGOT) 22 15 - 37 U/L    Alk.  phosphatase 99 45 - 117 U/L    Protein, total 7.2 6.4 - 8.2 g/dL    Albumin 2.3 (L) 3.5 - 5.0 g/dL    Globulin 4.9 (H) 2.0 - 4.0 g/dL    A-G Ratio 0.5 (L) 1.1 - 2.2     AMMONIA    Collection Time: 10/23/21  2:31 AM   Result Value Ref Range    Ammonia 40 (H) <32 UMOL/L   CBC WITH AUTOMATED DIFF    Collection Time: 10/23/21  2:31 AM   Result Value Ref Range    WBC 9.1 4.1 - 11.1 K/uL    RBC 3.24 (L) 4.10 - 5.70 M/uL    HGB 9.0 (L) 12.1 - 17.0 g/dL    HCT 28.0 (L) 36.6 - 50.3 %    MCV 86.4 80.0 - 99.0 FL    MCH 27.8 26.0 - 34.0 PG    MCHC 32.1 30.0 - 36.5 g/dL    RDW 15.2 (H) 11.5 - 14.5 %    PLATELET 425 (H) 295 - 400 K/uL    MPV 8.2 (L) 8.9 - 12.9 FL    NRBC 0.0 0  WBC    ABSOLUTE NRBC 0.00 0.00 - 0.01 K/uL    NEUTROPHILS 70 32 - 75 %    LYMPHOCYTES 15 12 - 49 %    MONOCYTES 11 5 - 13 %    EOSINOPHILS 3 0 - 7 %    BASOPHILS 0 0 - 1 %    IMMATURE GRANULOCYTES 1 (H) 0.0 - 0.5 %    ABS. NEUTROPHILS 6.4 1.8 - 8.0 K/UL    ABS. LYMPHOCYTES 1.4 0.8 - 3.5 K/UL    ABS. MONOCYTES 1.0 0.0 - 1.0 K/UL    ABS. EOSINOPHILS 0.2 0.0 - 0.4 K/UL    ABS. BASOPHILS 0.0 0.0 - 0.1 K/UL    ABS. IMM. GRANS. 0.1 (H) 0.00 - 0.04 K/UL    DF AUTOMATED         Assessment:     Hospital Problems  Date Reviewed: 11/11/2020        Codes Class Noted POA    Se[facility]cal[facility] ICD-10-CM: E94  ICD-9-CM: 433  10/13/2021 Yes        * (Principal) Sepsis ([facility] [address] ICD-10-CM: A41.9  ICD-9-CM: 038.9, 995.91  10/10/2021 Unknown              Plan/Recommendations/Medical Decision Making:   Overall seems to be doing well. Continue regular diet. Continue IV antibiotics for the next several days.

## 2021-10-24 ENCOUNTER — APPOINTMENT (OUTPATIENT)
Dept: CT IMAGING | Age: 66
DRG: 853 | End: 2021-10-24
Attending: HOSPITALIST
Payer: MEDICARE

## 2021-10-24 LAB
GLUCOSE BLD STRIP.AUTO-MCNC: 129 MG/DL (ref 65–117)
SERVICE CMNT-IMP: ABNORMAL

## 2021-10-24 PROCEDURE — 74011250636 HC RX REV CODE- 250/636: Performed by: SURGERY

## 2021-10-24 PROCEDURE — 99024 POSTOP FOLLOW-UP VISIT: CPT | Performed by: SURGERY

## 2021-10-24 PROCEDURE — 74011000258 HC RX REV CODE- 258: Performed by: SURGERY

## 2021-10-24 PROCEDURE — 74011250637 HC RX REV CODE- 250/637: Performed by: HOSPITALIST

## 2021-10-24 PROCEDURE — 74011250636 HC RX REV CODE- 250/636: Performed by: NURSE PRACTITIONER

## 2021-10-24 PROCEDURE — 74011250637 HC RX REV CODE- 250/637: Performed by: NURSE PRACTITIONER

## 2021-10-24 PROCEDURE — 65270000029 HC RM PRIVATE

## 2021-10-24 PROCEDURE — 74011000250 HC RX REV CODE- 250: Performed by: NURSE PRACTITIONER

## 2021-10-24 PROCEDURE — 74011250637 HC RX REV CODE- 250/637: Performed by: SURGERY

## 2021-10-24 PROCEDURE — 74011250637 HC RX REV CODE- 250/637: Performed by: INTERNAL MEDICINE

## 2021-10-24 PROCEDURE — C9113 INJ PANTOPRAZOLE SODIUM, VIA: HCPCS | Performed by: NURSE PRACTITIONER

## 2021-10-24 PROCEDURE — 70450 CT HEAD/BRAIN W/O DYE: CPT

## 2021-10-24 PROCEDURE — 82962 GLUCOSE BLOOD TEST: CPT

## 2021-10-24 RX ADMIN — SODIUM CHLORIDE 40 MG: 9 INJECTION INTRAMUSCULAR; INTRAVENOUS; SUBCUTANEOUS at 09:24

## 2021-10-24 RX ADMIN — PIPERACILLIN AND TAZOBACTAM 3.38 G: 3; .375 INJECTION, POWDER, LYOPHILIZED, FOR SOLUTION INTRAVENOUS at 03:33

## 2021-10-24 RX ADMIN — Medication 10 ML: at 14:00

## 2021-10-24 RX ADMIN — PIPERACILLIN AND TAZOBACTAM 3.38 G: 3; .375 INJECTION, POWDER, LYOPHILIZED, FOR SOLUTION INTRAVENOUS at 18:15

## 2021-10-24 RX ADMIN — VITAMIN E CAP 400 UNIT 400 UNITS: 400 CAP at 12:18

## 2021-10-24 RX ADMIN — VITAMIN E CAP 400 UNIT 400 UNITS: 400 CAP at 21:38

## 2021-10-24 RX ADMIN — VITAMIN E CAP 400 UNIT 400 UNITS: 400 CAP at 18:14

## 2021-10-24 RX ADMIN — LORAZEPAM 1 MG: 2 INJECTION INTRAMUSCULAR; INTRAVENOUS at 00:27

## 2021-10-24 RX ADMIN — Medication 10000 UNITS: at 10:11

## 2021-10-24 RX ADMIN — FLUOXETINE 40 MG: 20 CAPSULE ORAL at 10:11

## 2021-10-24 RX ADMIN — PIPERACILLIN AND TAZOBACTAM 3.38 G: 3; .375 INJECTION, POWDER, LYOPHILIZED, FOR SOLUTION INTRAVENOUS at 12:18

## 2021-10-24 RX ADMIN — Medication 100 MG: at 10:11

## 2021-10-24 RX ADMIN — POTASSIUM CHLORIDE, DEXTROSE MONOHYDRATE AND SODIUM CHLORIDE 50 ML/HR: 150; 5; 450 INJECTION, SOLUTION INTRAVENOUS at 20:01

## 2021-10-24 RX ADMIN — VITAMIN E CAP 400 UNIT 400 UNITS: 400 CAP at 10:11

## 2021-10-24 RX ADMIN — TRAZODONE HYDROCHLORIDE 100 MG: 100 TABLET ORAL at 21:38

## 2021-10-24 RX ADMIN — PANCRELIPASE 2 CAPSULE: 60000; 12000; 38000 CAPSULE, DELAYED RELEASE PELLETS ORAL at 18:15

## 2021-10-24 NOTE — PROGRESS NOTES
Bedside and Verbal shift change report given to Chasidy Hooks (oncoming nurse) by Freddy Villanueva (offgoing nurse). Report included the following information SBAR, Kardex, Intake/Output, MAR and Recent Results.

## 2021-10-24 NOTE — PROGRESS NOTES
[facility] [facility] Adult  Hospitalist Group                                                                                          Hospitalist Progress Note  Shawn Addison MD  Answering service: [phone] from in house phone        Date of Service:  10/24/2021  NAME:  Christy Segura. :  1955  MRN:  [id number]    This documentation was facilitated by a Voice Recognition software and may contain inadvertent typographical errors. Admission Summary:     Christy Chun is a 77 y.o. male with Mhx s/p choledochojejunostomy d/t stricture of CBD from chronic pancreatitis was admitted on 10/10/2021 and underwent laparoscopic with possible open drainage of the pancreatic abscess, and is on antibiotic for sepsis was noted to have intermittent confusion and hallucination by this family. Symptom started yesterday. According to family report patient was acting differently. He was seeing things in the ceiling and conversing with no one in particular. During my evaluation, patient stated he was not having a good night sleep. He slept only 2-3 hours a night before, c/o some discomfort otherwise no significant pain. He is alert and oriented X 4, able to converse appropriately. He is on dilaudid for pain. Otherwise no other significant speech, swallow or lateralizing weakness noted. Interval history / Subjective:        Overnight events noted, patient's more hallucinatory last night. He had received trazodone 10 mg, IV Haldol and Ativan. He has been sleepy throughout the day. He is arousable, alert and oriented, no complaints. Assessment & Plan:   # Intermittent hallucination possible hospital delirium vs sleep deprivation   --Exam is nonfocal.  Medications reviewed. Discontinue scopolamine patch, discussed with surgical team who are okay. --Sleep hygiene. Increase trazodone. With antihistamines, anticholinergics,discussed with RN to avoid  --More hallucinatory night of 10/23. Received trazodone, IV Haldol and Ativan. --Head CT negative. Pancreatic abscess s/p laparoscopic drainage 10/14  Mgmt per surgery, now primary     Hypophos, repleted   Hypokalemia: repleted  Hypomagnesemia: repleted     Anemia: improved/stable, mgmt per primary     Depression: home fluoxetine      Severe biliary strictures, s/p choledochojejunostomy on 2/1/2016     Hyperglycemia, likely 2/2 steroid injection, a1c 6.1, preDM     Hospital Problems  Date Reviewed: 11/11/2020        Codes Class Noted POA    Severe protein-calorie malnutrition ([facility] [address] ICD-10-CM: A99  ICD-9-CM: 388  10/13/2021 Yes        * (Principal) Sepsis ([facility] [address] ICD-10-CM: A41.9  ICD-9-CM: 038.9, 995.91  10/10/2021 Unknown                Review of Systems:   A comprehensive review of systems was negative except for that written in the HPI. Vital Signs:    Last 24hrs VS reviewed since prior progress note. Most recent are:  Visit Vitals  BP (!) 154/88   Pulse 90   Temp 97.9 °F (36.6 °C)   Resp 16   Ht 6' (1.829 m)   Wt 73 kg (161 lb)   SpO2 98%   BMI 21.84 kg/m²       No intake or output data in the 24 hours ending 10/24/21 1622     Physical Examination:     I had a face to face encounter with this patient and independently examined them on10/24/2021 as outlined below:        Constitutional:  No acute distress, cooperative, pleasant    HEENT:  Atraumatic. Oral mucosa moist,. Non icteric sclera. No pallor. Resp:  CTA bilaterally. No wheezing/rhonchi/rales. No accessory muscle use   Chest Wall: No deformity   CV:  Regular rhythm, normal rate, no murmurs, gallops, rubs    GI:  Soft, non distended, non tender.  normoactive bowel sounds, no hepatosplenomegaly    :  No CVA or suprapubic tenderness    Musculoskeletal:  No edema, warm, 2+ pulses throughout    Neurologic:  Mental status:AAOx3,   Cranial nerves II-XII : WNL  Motor exam:Moves all extremities symmetrically              Data Review:    Review and/or order of clinical lab test  Review and/or order of tests in the radiology section of CPT  Review and/or order of tests in the medicine section of CPT      Labs:     Recent Labs     10/23/21  0231 10/22/21  0249   WBC 9.1 11.0   HGB 9.0* 8.4*   HCT 28.0* 26.5*   * 431*     Recent Labs     10/23/21  0231 10/22/21  0249    135*   K 4.2 4.1    102   CO2 27 28   BUN 5* 4*   CREA 0.90 0.88   * 144*   CA 8.9 9.0     Recent Labs     10/23/21  0231   ALT 17   AP 99   TBILI 0.6   TP 7.2   ALB 2.3*   GLOB 4.9*     No results for input(s): INR, PTP, APTT, INREXT, INREXT in the last 72 hours. No results for input(s): FE, TIBC, PSAT, FERR in the last 72 hours. No results found for: FOL, RBCF   No results for input(s): PH, PCO2, PO2 in the last 72 hours. No results for input(s): CPK, CKNDX, TROIQ in the last 72 hours.     No lab exists for component: CPKMB  No results found for: CHOL, CHOLX, CHLST, CHOLV, HDL, HDLP, LDL, LDLC, DLDLP, TGLX, TRIGL, TRIGP, CHHD, CHHDX  Lab Results   Component Value Date/Time    Glucose (POC) 125 (H) 10/20/2021 09:21 PM    Glucose (POC) 77 10/14/2021 12:14 PM    Glucose (POC) 102 (H) 02/20/2016 06:17 AM    Glucose (POC) 136 (H) 02/19/2016 10:57 PM    Glucose (POC) 116 (H) 02/19/2016 04:44 PM     Lab Results   Component Value Date/Time    Color DARK YELLOW 02/08/2016 08:22 PM    Appearance CLOUDY (A) 02/08/2016 08:22 PM    Specific gravity 1.026 02/08/2016 08:22 PM    pH (UA) 5.5 02/08/2016 08:22 PM    Protein 100 (A) 02/08/2016 08:22 PM    Glucose NEGATIVE  02/08/2016 08:22 PM    Ketone 40 (A) 02/08/2016 08:22 PM    Bilirubin LARGE (A) 08/16/2014 04:35 AM    Urobilinogen 1.0 02/08/2016 08:22 PM    Nitrites NEGATIVE  02/08/2016 08:22 PM    Leukocyte Esterase SMALL (A) 02/08/2016 08:22 PM    Epithelial cells FEW 02/08/2016 08:22 PM    Bacteria NEGATIVE  02/08/2016 08:22 PM    WBC 10-20 02/08/2016 08:22 PM    RBC 0-5 02/08/2016 08:22 PM         Medications Reviewed:     Current Facility-Administered Medications Medication Dose Route Frequency    traZODone (DESYREL) tablet 100 mg  100 mg Oral QHS    dextrose 5% - 0.45% NaCl with KCl 20 mEq/L infusion  50 mL/hr IntraVENous CONTINUOUS    lipase-protease-amylase (CREON 12,000) capsule 2 Capsule  2 Capsule Oral TIDAC    0.9% sodium chloride infusion 250 mL  250 mL IntraVENous PRN    thiamine HCL (B-1) tablet 100 mg  100 mg Oral DAILY    vitamin E acetate capsule 400 Units  400 Units Oral QID    HYDROmorphone (DILAUDID) tablet 2 mg  2 mg Oral Q3H PRN    HYDROmorphone (DILAUDID) injection 1 mg  1 mg IntraVENous Q4H PRN    diphenhydrAMINE (BENADRYL) injection 12.5 mg  12.5 mg IntraVENous Q6H PRN    benzocaine-menthoL (CEPACOL) lozenge 1 Lozenge  1 Lozenge Mucous Membrane PRN    0.9% sodium chloride infusion 250 mL  250 mL IntraVENous PRN    sodium chloride (NS) flush 5-40 mL  5-40 mL IntraVENous Q8H    sodium chloride (NS) flush 5-40 mL  5-40 mL IntraVENous PRN    FLUoxetine (PROzac) capsule 40 mg  40 mg Oral DAILY    pantoprazole (PROTONIX) 40 mg in 0.9% sodium chloride 10 mL injection  40 mg IntraVENous DAILY    piperacillin-tazobactam (ZOSYN) 3.375 g in 0.9% sodium chloride (MBP/ADV) 100 mL MBP  3.375 g IntraVENous Q8H    sodium chloride (NS) flush 5-40 mL  5-40 mL IntraVENous Q8H    sodium chloride (NS) flush 5-40 mL  5-40 mL IntraVENous PRN    acetaminophen (TYLENOL) tablet 650 mg  650 mg Oral Q6H PRN    Or    acetaminophen (TYLENOL) suppository 650 mg  650 mg Rectal Q6H PRN    polyethylene glycol (MIRALAX) packet 17 g  17 g Oral DAILY PRN    ondansetron (ZOFRAN ODT) tablet 4 mg  4 mg Oral Q8H PRN    Or    ondansetron (ZOFRAN) injection 4 mg  4 mg IntraVENous Q6H PRN    [Held by provider] enoxaparin (LOVENOX) injection 40 mg  40 mg SubCUTAneous DAILY     ______________________________________________________________________  EXPECTED LENGTH OF STAY: 9d 14h  ACTUAL LENGTH OF STAY:          14                 Keeley Nava MD

## 2021-10-24 NOTE — PROGRESS NOTES
Progress Note    Patient: Meenakshi Waters. MRN: [id number]  SSN: [id number]    YOB: 1955  Age: 77 y.o. Sex: male      Admit Date: 10/10/2021    10 Days Post-Op    Procedure:  Procedure(s):  LAPAROSCOPIC POSSIBLE OPEN DRAINAGE OF PANCREATIC ABCESS    Subjective:     Patient had additional delirium last night. Received both Ativan and Haldol overnight. He has been seen by internal medicine. Currently sleeping. Objective:     Visit Vitals  BP (!) 154/88   Pulse 90   Temp 97.9 °F (36.6 °C)   Resp 16   Ht 6' (1.829 m)   Wt 161 lb (73 kg)   SpO2 98%   BMI 21.84 kg/m²       Temp (24hrs), Av.9 °F (36.6 °C), Min:97.3 °F (36.3 °C), Max:98.3 °F (36.8 °C)      Physical Exam:    General sleeping  Lungs clear bilaterally  Heart regular rate and rhythm  Abdomen soft nontender    Data Review: images and reports reviewed  Head CT negative  Lab Review: All lab results for the last 24 hours reviewed. Assessment:     Hospital Problems  Date Reviewed: 2020        Codes Class Noted POA    Severe protein-calorie malnutrition ([facility] ICD-10-CM: F01  ICD-9-CM: 842  10/13/2021 Yes        * (Principal) Sepsis ([facility] ICD-10-CM: A41.9  ICD-9-CM: 038.9, 995.91  10/10/2021 Unknown              Plan/Recommendations/Medical Decision Making:   Continue treatment for delirium per internal medicine. Regular diet while awake. Continue IV antibiotics.

## 2021-10-24 NOTE — PROGRESS NOTES
2145-Pt HR in the 120s, RN messaged on call hospitalist for VS change. Pt having an argument with the ceiling, yelling at individuals he sees. RN informed NP.    2230-Pt tearful, loudly conversing with \"two women\" he saw that were allegedly \"beating\" his brother. The RN assessed orientation, and the pt is A/Ox2, loudly discussing his concerns for his brothers. Pt looking past staff while talking at the air, NP stated staff should wait to see if trazodone helped. 2252-RN informed NP there was no change in mental status. NP ordered a dose of ativan IV and haldol IM (NP placed orders directly). Pt refused nonskid socks at this time. 0027-RN gave IM haldol initially with no improvement in hallucinations or agitation. IV ativan given, pt asleep within 15 minutes. 1-As MD made clear in his note, concern for sleep deprivation/hospital delirium, RN attempted to allow pt to sleep as much as possible following medication administration.

## 2021-10-25 LAB
ALBUMIN SERPL-MCNC: 2.4 G/DL (ref 3.5–5)
ALBUMIN/GLOB SERPL: 0.4 {RATIO} (ref 1.1–2.2)
ALP SERPL-CCNC: 92 U/L (ref 45–117)
ALT SERPL-CCNC: 19 U/L (ref 12–78)
ANION GAP SERPL CALC-SCNC: 3 MMOL/L (ref 5–15)
AST SERPL-CCNC: 22 U/L (ref 15–37)
BASOPHILS # BLD: 0.1 K/UL (ref 0–0.1)
BASOPHILS NFR BLD: 1 % (ref 0–1)
BILIRUB SERPL-MCNC: 0.3 MG/DL (ref 0.2–1)
BUN SERPL-MCNC: 8 MG/DL (ref 6–20)
BUN/CREAT SERPL: 10 (ref 12–20)
CALCIUM SERPL-MCNC: 8.6 MG/DL (ref 8.5–10.1)
CHLORIDE SERPL-SCNC: 105 MMOL/L (ref 97–108)
CO2 SERPL-SCNC: 27 MMOL/L (ref 21–32)
CREAT SERPL-MCNC: 0.81 MG/DL (ref 0.7–1.3)
DIFFERENTIAL METHOD BLD: ABNORMAL
EOSINOPHIL # BLD: 0.3 K/UL (ref 0–0.4)
EOSINOPHIL NFR BLD: 4 % (ref 0–7)
ERYTHROCYTE [DISTWIDTH] IN BLOOD BY AUTOMATED COUNT: 15.5 % (ref 11.5–14.5)
GLOBULIN SER CALC-MCNC: 5.6 G/DL (ref 2–4)
GLUCOSE BLD STRIP.AUTO-MCNC: 123 MG/DL (ref 65–117)
GLUCOSE BLD STRIP.AUTO-MCNC: 132 MG/DL (ref 65–117)
GLUCOSE SERPL-MCNC: 125 MG/DL (ref 65–100)
HCT VFR BLD AUTO: 30.7 % (ref 36.6–50.3)
HGB BLD-MCNC: 9.7 G/DL (ref 12.1–17)
IMM GRANULOCYTES # BLD AUTO: 0.1 K/UL (ref 0–0.04)
IMM GRANULOCYTES NFR BLD AUTO: 1 % (ref 0–0.5)
LYMPHOCYTES # BLD: 1.3 K/UL (ref 0.8–3.5)
LYMPHOCYTES NFR BLD: 17 % (ref 12–49)
MCH RBC QN AUTO: 27.9 PG (ref 26–34)
MCHC RBC AUTO-ENTMCNC: 31.6 G/DL (ref 30–36.5)
MCV RBC AUTO: 88.2 FL (ref 80–99)
MONOCYTES # BLD: 0.6 K/UL (ref 0–1)
MONOCYTES NFR BLD: 8 % (ref 5–13)
NEUTS SEG # BLD: 5.3 K/UL (ref 1.8–8)
NEUTS SEG NFR BLD: 69 % (ref 32–75)
NRBC # BLD: 0 K/UL (ref 0–0.01)
NRBC BLD-RTO: 0 PER 100 WBC
PLATELET # BLD AUTO: 506 K/UL (ref 150–400)
PMV BLD AUTO: 8.5 FL (ref 8.9–12.9)
POTASSIUM SERPL-SCNC: 3.7 MMOL/L (ref 3.5–5.1)
PROT SERPL-MCNC: 8 G/DL (ref 6.4–8.2)
RBC # BLD AUTO: 3.48 M/UL (ref 4.1–5.7)
SERVICE CMNT-IMP: ABNORMAL
SERVICE CMNT-IMP: ABNORMAL
SODIUM SERPL-SCNC: 135 MMOL/L (ref 136–145)
VIT B12 SERPL-MCNC: 757 PG/ML (ref 193–986)
WBC # BLD AUTO: 7.6 K/UL (ref 4.1–11.1)

## 2021-10-25 PROCEDURE — 74011000250 HC RX REV CODE- 250: Performed by: NURSE PRACTITIONER

## 2021-10-25 PROCEDURE — 82962 GLUCOSE BLOOD TEST: CPT

## 2021-10-25 PROCEDURE — 74011250637 HC RX REV CODE- 250/637: Performed by: SURGERY

## 2021-10-25 PROCEDURE — 94760 N-INVAS EAR/PLS OXIMETRY 1: CPT

## 2021-10-25 PROCEDURE — 36415 COLL VENOUS BLD VENIPUNCTURE: CPT

## 2021-10-25 PROCEDURE — 65270000029 HC RM PRIVATE

## 2021-10-25 PROCEDURE — 74011250637 HC RX REV CODE- 250/637: Performed by: NURSE PRACTITIONER

## 2021-10-25 PROCEDURE — 99024 POSTOP FOLLOW-UP VISIT: CPT | Performed by: PHYSICIAN ASSISTANT

## 2021-10-25 PROCEDURE — 74011250636 HC RX REV CODE- 250/636: Performed by: NURSE PRACTITIONER

## 2021-10-25 PROCEDURE — 74011250636 HC RX REV CODE- 250/636: Performed by: SURGERY

## 2021-10-25 PROCEDURE — 74011250637 HC RX REV CODE- 250/637: Performed by: HOSPITALIST

## 2021-10-25 PROCEDURE — 74011250637 HC RX REV CODE- 250/637: Performed by: INTERNAL MEDICINE

## 2021-10-25 PROCEDURE — 82607 VITAMIN B-12: CPT

## 2021-10-25 PROCEDURE — 85025 COMPLETE CBC W/AUTO DIFF WBC: CPT

## 2021-10-25 PROCEDURE — C9113 INJ PANTOPRAZOLE SODIUM, VIA: HCPCS | Performed by: NURSE PRACTITIONER

## 2021-10-25 PROCEDURE — 74011000258 HC RX REV CODE- 258: Performed by: SURGERY

## 2021-10-25 PROCEDURE — 80053 COMPREHEN METABOLIC PANEL: CPT

## 2021-10-25 RX ADMIN — PANCRELIPASE 2 CAPSULE: 60000; 12000; 38000 CAPSULE, DELAYED RELEASE PELLETS ORAL at 17:04

## 2021-10-25 RX ADMIN — Medication 100 MG: at 09:36

## 2021-10-25 RX ADMIN — TRAZODONE HYDROCHLORIDE 100 MG: 100 TABLET ORAL at 21:02

## 2021-10-25 RX ADMIN — PANCRELIPASE 2 CAPSULE: 60000; 12000; 38000 CAPSULE, DELAYED RELEASE PELLETS ORAL at 11:27

## 2021-10-25 RX ADMIN — VITAMIN E CAP 400 UNIT 400 UNITS: 400 CAP at 14:02

## 2021-10-25 RX ADMIN — PANCRELIPASE 2 CAPSULE: 60000; 12000; 38000 CAPSULE, DELAYED RELEASE PELLETS ORAL at 07:20

## 2021-10-25 RX ADMIN — VITAMIN E CAP 400 UNIT 400 UNITS: 400 CAP at 09:36

## 2021-10-25 RX ADMIN — VITAMIN E CAP 400 UNIT 400 UNITS: 400 CAP at 21:02

## 2021-10-25 RX ADMIN — PIPERACILLIN AND TAZOBACTAM 3.38 G: 3; .375 INJECTION, POWDER, LYOPHILIZED, FOR SOLUTION INTRAVENOUS at 11:24

## 2021-10-25 RX ADMIN — SODIUM CHLORIDE 40 MG: 9 INJECTION INTRAMUSCULAR; INTRAVENOUS; SUBCUTANEOUS at 09:37

## 2021-10-25 RX ADMIN — VITAMIN E CAP 400 UNIT 400 UNITS: 400 CAP at 17:07

## 2021-10-25 RX ADMIN — FLUOXETINE 40 MG: 20 CAPSULE ORAL at 09:36

## 2021-10-25 RX ADMIN — PIPERACILLIN AND TAZOBACTAM 3.38 G: 3; .375 INJECTION, POWDER, LYOPHILIZED, FOR SOLUTION INTRAVENOUS at 19:09

## 2021-10-25 RX ADMIN — PIPERACILLIN AND TAZOBACTAM 3.38 G: 3; .375 INJECTION, POWDER, LYOPHILIZED, FOR SOLUTION INTRAVENOUS at 03:26

## 2021-10-25 NOTE — PROGRESS NOTES
Spiritual Care Assessment/Progress Note  [address]      NAME: Shannon Aiken. MRN: [id number]  AGE: 77 y.o. SEX: male  Sikhism Affiliation: Islam   Language: English     10/25/2021     Total Time (in minutes): 15     Spiritual Assessment begun in [place] [address] through conversation with:         [x]Patient        [x] Family    [] Friend(s)        Reason for Consult: Initial/Spiritual assessment, patient floor     Spiritual beliefs: (Please include comment if needed)     [] Identifies with a cheryle tradition:         [] Supported by a cheryle community:            [] Claims no spiritual orientation:           [] Seeking spiritual identity:                [x] Adheres to an individual form of spirituality:           [] Not able to assess:                           Identified resources for coping:      [x] Prayer                               [] Music                  [] Guided Imagery     [x] Family/friends                 [] Pet visits     [] Devotional reading                         [] Unknown     [] Other:                                             Interventions offered during this visit: (See comments for more details)    Patient Interventions: Affirmation of cheryle, Affirmation of emotions/emotional suffering, Normalization of emotional/spiritual concerns, Prayer (assurance of), Prayer (actual)     Family/Friend(s):  Affirmation of emotions/emotional suffering, Normalization of emotional/spiritual concerns, Prayer (assurance of), Prayer (actual)     Plan of Care:     [] Support spiritual and/or cultural needs    [] Support AMD and/or advance care planning process      [] Support grieving process   [] Coordinate Rites and/or Rituals    [] Coordination with community clergy   [] No spiritual needs identified at this time   [] Detailed Plan of Care below (See Comments)  [] Make referral to Music Therapy  [] Make referral to Pet Therapy     [] Make referral to Addiction services  [] Make referral to [facility]  [] Make referral to Spiritual Care Partner  [] No future visits requested        [] Follow up upon further referrals     Comments: Rounding in Room 510. Patient and spouse Graham Persaud were present during the visit. Aubrey Acosta shared he was not affiliated with a cheryle community, but believed in [facility]. He was just happy to be feeling better these days and hoping to go home soon. Provided ministry of presence, empathic listening, prayer, and cultivated a relationship of care and support. Advised of  availability. Advised nurse to contact [facility] for any further referrals. Visited by: Kathy Calderon.  Merle Pineda, [address] [place] [address] Road paging Service [phone])

## 2021-10-25 NOTE — PROGRESS NOTES
[address] [facility] Adult  Hospitalist Group                                                                                          Hospitalist Progress Note  Mary Boone MD  Answering service: [phone] from in house phone        Date of Service:  10/25/2021  NAME:  Mikey Vasquez. :  1955  MRN:  [id number]    This documentation was facilitated by a Voice Recognition software and may contain inadvertent typographical errors. Admission Summary:     Mikey Haddad is a 77 y.o. male with Mhx s/p choledochojejunostomy d/t stricture of CBD from chronic pancreatitis was admitted on 10/10/2021 and underwent laparoscopic with possible open drainage of the pancreatic abscess, and is on antibiotic for sepsis was noted to have intermittent confusion and hallucination by this family. Symptom started yesterday. According to family report patient was acting differently. He was seeing things in the ceiling and conversing with no one in particular. During my evaluation, patient stated he was not having a good night sleep. He slept only 2-3 hours a night before, c/o some discomfort otherwise no significant pain. He is alert and oriented X 4, able to converse appropriately. He is on dilaudid for pain. Otherwise no other significant speech, swallow or lateralizing weakness noted. Interval history / Subjective:        Seems to be having a good day. He denies hallucinations. Assessment & Plan:   # Intermittent hallucination possible hospital delirium vs sleep deprivation   --Exam is nonfocal.  Medications reviewed. Discontinue scopolamine patch, discussed with surgical team who are okay. --Sleep hygiene. Increase trazodone. With antihistamines, anticholinergics,discussed with RN to avoid  --More hallucinatory night of 10/23. Received trazodone, IV Haldol and Ativan. --Head CT negative.   --Clear and denies hallucinations 10/25     Pancreatic abscess s/p laparoscopic drainage 10/14  Mgmt per surgery, now primary     Hypophos, repleted   Hypokalemia: repleted  Hypomagnesemia: repleted     Anemia: improved/stable, mgmt per primary     Depression: home fluoxetine      Severe biliary strictures, s/p choledochojejunostomy on 2/1/2016     Hyperglycemia, likely 2/2 steroid injection, a1c 6.1, preDM     Hospital Problems  Date Reviewed: 11/11/2020        Codes Class Noted POA    Severe protein-calorie malnutrition ([facility] [address] ICD-10-CM: U59  ICD-9-CM: 469  10/13/2021 Yes        * (Principal) Sepsis ([facility] [address] ICD-10-CM: A41.9  ICD-9-CM: 038.9, 995.91  10/10/2021 Unknown                Review of Systems:   A comprehensive review of systems was negative except for that written in the HPI. Vital Signs:    Last 24hrs VS reviewed since prior progress note. Most recent are:  Visit Vitals  /69   Pulse (!) 101   Temp 98.1 °F (36.7 °C)   Resp 16   Ht 6' (1.829 m)   Wt 73 kg (161 lb)   SpO2 98%   BMI 21.84 kg/m²         Intake/Output Summary (Last 24 hours) at 10/25/2021 1746  Last data filed at 10/25/2021 1400  Gross per 24 hour   Intake 400 ml   Output 250 ml   Net 150 ml        Physical Examination:     I had a face to face encounter with this patient and independently examined them on10/25/2021 as outlined below:        Constitutional:  No acute distress, cooperative, pleasant    HEENT:  Atraumatic. Oral mucosa moist,. Non icteric sclera. No pallor. Resp:  CTA bilaterally. No wheezing/rhonchi/rales. No accessory muscle use   Chest Wall: No deformity   CV:  Regular rhythm, normal rate, no murmurs, gallops, rubs    GI:  Soft, non distended, non tender.  normoactive bowel sounds, no hepatosplenomegaly    :  No CVA or suprapubic tenderness    Musculoskeletal:  No edema, warm, 2+ pulses throughout    Neurologic:  Mental status:AAOx3,   Cranial nerves II-XII : WNL  Motor exam:Moves all extremities symmetrically              Data Review:    Review and/or order of clinical lab test  Review and/or order of tests in the radiology section of CPT  Review and/or order of tests in the medicine section of CPT      Labs:     Recent Labs     10/25/21  0325 10/23/21  0231   WBC 7.6 9.1   HGB 9.7* 9.0*   HCT 30.7* 28.0*   * 487*     Recent Labs     10/25/21  0325 10/23/21  0231   * 136   K 3.7 4.2    102   CO2 27 27   BUN 8 5*   CREA 0.81 0.90   * 115*   CA 8.6 8.9     Recent Labs     10/25/21  0325 10/23/21  0231   ALT 19 17   AP 92 99   TBILI 0.3 0.6   TP 8.0 7.2   ALB 2.4* 2.3*   GLOB 5.6* 4.9*     No results for input(s): INR, PTP, APTT, INREXT, INREXT in the last 72 hours. No results for input(s): FE, TIBC, PSAT, FERR in the last 72 hours. No results found for: FOL, RBCF   No results for input(s): PH, PCO2, PO2 in the last 72 hours. No results for input(s): CPK, CKNDX, TROIQ in the last 72 hours.     No lab exists for component: CPKMB  No results found for: CHOL, CHOLX, CHLST, CHOLV, HDL, HDLP, LDL, LDLC, DLDLP, TGLX, TRIGL, TRIGP, CHHD, CHHDX  Lab Results   Component Value Date/Time    Glucose (POC) 123 (H) 10/25/2021 04:18 PM    Glucose (POC) 132 (H) 10/25/2021 11:27 AM    Glucose (POC) 129 (H) 10/24/2021 09:30 PM    Glucose (POC) 125 (H) 10/20/2021 09:21 PM    Glucose (POC) 77 10/14/2021 12:14 PM     Lab Results   Component Value Date/Time    Color DARK YELLOW 02/08/2016 08:22 PM    Appearance CLOUDY (A) 02/08/2016 08:22 PM    Specific gravity 1.026 02/08/2016 08:22 PM    pH (UA) 5.5 02/08/2016 08:22 PM    Protein 100 (A) 02/08/2016 08:22 PM    Glucose NEGATIVE  02/08/2016 08:22 PM    Ketone 40 (A) 02/08/2016 08:22 PM    Bilirubin LARGE (A) 08/16/2014 04:35 AM    Urobilinogen 1.0 02/08/2016 08:22 PM    Nitrites NEGATIVE  02/08/2016 08:22 PM    Leukocyte Esterase SMALL (A) 02/08/2016 08:22 PM    Epithelial cells FEW 02/08/2016 08:22 PM    Bacteria NEGATIVE  02/08/2016 08:22 PM    WBC 10-20 02/08/2016 08:22 PM    RBC 0-5 02/08/2016 08:22 PM         Medications Reviewed:     Current Facility-Administered Medications   Medication Dose Route Frequency    traZODone (DESYREL) tablet 100 mg  100 mg Oral QHS    dextrose 5% - 0.45% NaCl with KCl 20 mEq/L infusion  50 mL/hr IntraVENous CONTINUOUS    lipase-protease-amylase (CREON 12,000) capsule 2 Capsule  2 Capsule Oral TIDAC    0.9% sodium chloride infusion 250 mL  250 mL IntraVENous PRN    thiamine HCL (B-1) tablet 100 mg  100 mg Oral DAILY    vitamin E acetate capsule 400 Units  400 Units Oral QID    HYDROmorphone (DILAUDID) tablet 2 mg  2 mg Oral Q3H PRN    HYDROmorphone (DILAUDID) injection 1 mg  1 mg IntraVENous Q4H PRN    diphenhydrAMINE (BENADRYL) injection 12.5 mg  12.5 mg IntraVENous Q6H PRN    benzocaine-menthoL (CEPACOL) lozenge 1 Lozenge  1 Lozenge Mucous Membrane PRN    0.9% sodium chloride infusion 250 mL  250 mL IntraVENous PRN    sodium chloride (NS) flush 5-40 mL  5-40 mL IntraVENous Q8H    sodium chloride (NS) flush 5-40 mL  5-40 mL IntraVENous PRN    FLUoxetine (PROzac) capsule 40 mg  40 mg Oral DAILY    pantoprazole (PROTONIX) 40 mg in 0.9% sodium chloride 10 mL injection  40 mg IntraVENous DAILY    piperacillin-tazobactam (ZOSYN) 3.375 g in 0.9% sodium chloride (MBP/ADV) 100 mL MBP  3.375 g IntraVENous Q8H    sodium chloride (NS) flush 5-40 mL  5-40 mL IntraVENous Q8H    sodium chloride (NS) flush 5-40 mL  5-40 mL IntraVENous PRN    acetaminophen (TYLENOL) tablet 650 mg  650 mg Oral Q6H PRN    Or    acetaminophen (TYLENOL) suppository 650 mg  650 mg Rectal Q6H PRN    polyethylene glycol (MIRALAX) packet 17 g  17 g Oral DAILY PRN    ondansetron (ZOFRAN ODT) tablet 4 mg  4 mg Oral Q8H PRN    Or    ondansetron (ZOFRAN) injection 4 mg  4 mg IntraVENous Q6H PRN    [Held by provider] enoxaparin (LOVENOX) injection 40 mg  40 mg SubCUTAneous DAILY     ______________________________________________________________________  EXPECTED LENGTH OF STAY: 9d 14h  ACTUAL LENGTH OF STAY:          15                 Wondaya T Kike Thomas MD

## 2021-10-25 NOTE — PROGRESS NOTES
Bedside shift change report given to Dianne Davis (oncoming nurse) by Cari Lobo RN (offgoing nurse). Report included the following information SBAR, Kardex, Intake/Output, MAR and Recent Results.

## 2021-10-25 NOTE — PROGRESS NOTES
General Surgery Daily Progress Note    Admit Date: 10/10/2021  Post-Operative Day: 11 Days Post-Op from Procedure(s):  LAPAROSCOPIC POSSIBLE OPEN DRAINAGE OF PANCREATIC ABCESS     Subjective:     Last 24 hrs: Doing well today. Oriented, wife at bedside. Denies pain. Tolerating low-fat diet. No NV  Has not received pain meds x 3 days  +watery stool, voiding   No further delirium last night. WBC 7.6    Objective:     Blood pressure 128/87, pulse (!) 104, temperature 98.3 °F (36.8 °C), resp. rate 16, height 6' (1.829 m), weight 161 lb (73 kg), SpO2 98 %. Temp (24hrs), Av.9 °F (36.6 °C), Min:97.6 °F (36.4 °C), Max:98.3 °F (36.8 °C)      _____________________  Physical Exam:     Alert and Oriented, cooperative, in no acute distress. Cardiovascular: RRR, no peripheral edema  Lungs:CTAB   Abdomen: soft, nontender. +BS  Prior LLQ drain site w clean ,dry dressing in place. Assessment:   Principal Problem:    Sepsis ([facility] (10/10/2021)    Active Problems:    Severe protein-calorie malnutrition ([facility] 75.) (10/13/2021)            Plan:     OOB, ambulation as tolerated  IV Zosyn  D/c planning per Dr. Maame Aleman    Data Review:    Recent Labs     10/25/21  0325 10/23/21  0231   WBC 7.6 9.1   HGB 9.7* 9.0*   HCT 30.7* 28.0*   * 487*     Recent Labs     10/25/21  0325 10/23/21  0231   * 136   K 3.7 4.2    102   CO2 27 27   * 115*   BUN 8 5*   CREA 0.81 0.90   CA 8.6 8.9   ALB 2.4* 2.3*   ALT 19 17     No results for input(s): AML, LPSE in the last 72 hours.         ______________________  Medications:    Current Facility-Administered Medications   Medication Dose Route Frequency    traZODone (DESYREL) tablet 100 mg  100 mg Oral QHS    dextrose 5% - 0.45% NaCl with KCl 20 mEq/L infusion  50 mL/hr IntraVENous CONTINUOUS    lipase-protease-amylase (CREON 12,000) capsule 2 Capsule  2 Capsule Oral TIDAC    0.9% sodium chloride infusion 250 mL  250 mL IntraVENous PRN    thiamine HCL (B-1) tablet 100 mg 100 mg Oral DAILY    vitamin E acetate capsule 400 Units  400 Units Oral QID    HYDROmorphone (DILAUDID) tablet 2 mg  2 mg Oral Q3H PRN    HYDROmorphone (DILAUDID) injection 1 mg  1 mg IntraVENous Q4H PRN    diphenhydrAMINE (BENADRYL) injection 12.5 mg  12.5 mg IntraVENous Q6H PRN    benzocaine-menthoL (CEPACOL) lozenge 1 Lozenge  1 Lozenge Mucous Membrane PRN    0.9% sodium chloride infusion 250 mL  250 mL IntraVENous PRN    sodium chloride (NS) flush 5-40 mL  5-40 mL IntraVENous Q8H    sodium chloride (NS) flush 5-40 mL  5-40 mL IntraVENous PRN    FLUoxetine (PROzac) capsule 40 mg  40 mg Oral DAILY    pantoprazole (PROTONIX) 40 mg in 0.9% sodium chloride 10 mL injection  40 mg IntraVENous DAILY    piperacillin-tazobactam (ZOSYN) 3.375 g in 0.9% sodium chloride (MBP/ADV) 100 mL MBP  3.375 g IntraVENous Q8H    sodium chloride (NS) flush 5-40 mL  5-40 mL IntraVENous Q8H    sodium chloride (NS) flush 5-40 mL  5-40 mL IntraVENous PRN    acetaminophen (TYLENOL) tablet 650 mg  650 mg Oral Q6H PRN    Or    acetaminophen (TYLENOL) suppository 650 mg  650 mg Rectal Q6H PRN    polyethylene glycol (MIRALAX) packet 17 g  17 g Oral DAILY PRN    ondansetron (ZOFRAN ODT) tablet 4 mg  4 mg Oral Q8H PRN    Or    ondansetron (ZOFRAN) injection 4 mg  4 mg IntraVENous Q6H PRN    [Held by provider] enoxaparin (LOVENOX) injection 40 mg  40 mg SubCUTAneous DAILY       LISBETH Rivero  10/25/2021  ATTENDING ADDENDUM  I supervised the APC and reviewed the note. We discussed the plan of care  If all goes well maki byron Wednesday.   Will switch to po antibiotic in AM.

## 2021-10-25 NOTE — PROGRESS NOTES
Bedside and Verbal shift change report given to Jane Vickers (oncoming nurse) by Jolene Martines (offgoing nurse). Report included the following information SBAR, Kardex, Intake/Output, MAR and Recent Results.

## 2021-10-25 NOTE — PROGRESS NOTES
RUR: 10%     ARAM: Anticipated discharge home w/ wife. Patient's wife to provide transport home once medically stable. Follow-up with PCP/specialist.     POD#10 drainage of pancreatic abscess. Primary Contact: Wife, Anna Garcia, [phone]     9:15am - CM reviewed chart. Patient remains on IV antibiotics. Will continue treatment for delirium per chart notes. Discharge pending medical progress. Additional update to follow interdisciplinary arounds. CM to continue to follow as needed.      Deann Spatz, VEL   [phone]

## 2021-10-26 PROCEDURE — 74011000250 HC RX REV CODE- 250: Performed by: NURSE PRACTITIONER

## 2021-10-26 PROCEDURE — 74011250637 HC RX REV CODE- 250/637: Performed by: INTERNAL MEDICINE

## 2021-10-26 PROCEDURE — 74011250637 HC RX REV CODE- 250/637: Performed by: HOSPITALIST

## 2021-10-26 PROCEDURE — 74011250637 HC RX REV CODE- 250/637: Performed by: PHYSICIAN ASSISTANT

## 2021-10-26 PROCEDURE — C9113 INJ PANTOPRAZOLE SODIUM, VIA: HCPCS | Performed by: NURSE PRACTITIONER

## 2021-10-26 PROCEDURE — 65270000029 HC RM PRIVATE

## 2021-10-26 PROCEDURE — 74011250637 HC RX REV CODE- 250/637: Performed by: NURSE PRACTITIONER

## 2021-10-26 PROCEDURE — 74011000258 HC RX REV CODE- 258: Performed by: SURGERY

## 2021-10-26 PROCEDURE — 74011250636 HC RX REV CODE- 250/636: Performed by: SURGERY

## 2021-10-26 PROCEDURE — 94760 N-INVAS EAR/PLS OXIMETRY 1: CPT

## 2021-10-26 PROCEDURE — 74011250637 HC RX REV CODE- 250/637: Performed by: SURGERY

## 2021-10-26 PROCEDURE — 74011250636 HC RX REV CODE- 250/636: Performed by: NURSE PRACTITIONER

## 2021-10-26 RX ORDER — AMOXICILLIN AND CLAVULANATE POTASSIUM 875; 125 MG/1; MG/1
1 TABLET, FILM COATED ORAL EVERY 12 HOURS
Status: DISCONTINUED | OUTPATIENT
Start: 2021-10-26 | End: 2021-10-27 | Stop reason: HOSPADM

## 2021-10-26 RX ADMIN — SODIUM CHLORIDE 40 MG: 9 INJECTION INTRAMUSCULAR; INTRAVENOUS; SUBCUTANEOUS at 09:51

## 2021-10-26 RX ADMIN — PANCRELIPASE 2 CAPSULE: 60000; 12000; 38000 CAPSULE, DELAYED RELEASE PELLETS ORAL at 16:30

## 2021-10-26 RX ADMIN — PANCRELIPASE 2 CAPSULE: 60000; 12000; 38000 CAPSULE, DELAYED RELEASE PELLETS ORAL at 11:45

## 2021-10-26 RX ADMIN — FLUOXETINE 40 MG: 20 CAPSULE ORAL at 09:51

## 2021-10-26 RX ADMIN — PIPERACILLIN AND TAZOBACTAM 3.38 G: 3; .375 INJECTION, POWDER, LYOPHILIZED, FOR SOLUTION INTRAVENOUS at 02:39

## 2021-10-26 RX ADMIN — POTASSIUM CHLORIDE, DEXTROSE MONOHYDRATE AND SODIUM CHLORIDE 50 ML/HR: 150; 5; 450 INJECTION, SOLUTION INTRAVENOUS at 02:15

## 2021-10-26 RX ADMIN — TRAZODONE HYDROCHLORIDE 100 MG: 100 TABLET ORAL at 22:04

## 2021-10-26 RX ADMIN — AMOXICILLIN AND CLAVULANATE POTASSIUM 1 TABLET: 875; 125 TABLET, FILM COATED ORAL at 09:51

## 2021-10-26 RX ADMIN — Medication 100 MG: at 09:51

## 2021-10-26 RX ADMIN — VITAMIN E CAP 400 UNIT 400 UNITS: 400 CAP at 19:14

## 2021-10-26 RX ADMIN — VITAMIN E CAP 400 UNIT 400 UNITS: 400 CAP at 22:04

## 2021-10-26 RX ADMIN — AMOXICILLIN AND CLAVULANATE POTASSIUM 1 TABLET: 875; 125 TABLET, FILM COATED ORAL at 22:04

## 2021-10-26 RX ADMIN — Medication 10 ML: at 15:50

## 2021-10-26 RX ADMIN — POTASSIUM CHLORIDE, DEXTROSE MONOHYDRATE AND SODIUM CHLORIDE 50 ML/HR: 150; 5; 450 INJECTION, SOLUTION INTRAVENOUS at 22:04

## 2021-10-26 RX ADMIN — PANCRELIPASE 2 CAPSULE: 60000; 12000; 38000 CAPSULE, DELAYED RELEASE PELLETS ORAL at 06:58

## 2021-10-26 RX ADMIN — VITAMIN E CAP 400 UNIT 400 UNITS: 400 CAP at 14:29

## 2021-10-26 RX ADMIN — VITAMIN E CAP 400 UNIT 400 UNITS: 400 CAP at 09:53

## 2021-10-26 NOTE — PROGRESS NOTES
[facility] Adult  Hospitalist Group                                                                                          Hospitalist Progress Note  Alonza Cushing, MD  Answering service: [phone] from in house phone        Date of Service:  10/26/2021  NAME:  Lazaro Abdi :  1955  MRN:  [id number]    This documentation was facilitated by a Voice Recognition software and may contain inadvertent typographical errors. Admission Summary:     Lazaro Abdi is a 77 y.o. male with Mhx s/p choledochojejunostomy d/t stricture of CBD from chronic pancreatitis was admitted on 10/10/2021 and underwent laparoscopic with possible open drainage of the pancreatic abscess, and is on antibiotic for sepsis was noted to have intermittent confusion and hallucination by this family. Symptom started yesterday. According to family report patient was acting differently. He was seeing things in the ceiling and conversing with no one in particular. During my evaluation, patient stated he was not having a good night sleep. He slept only 2-3 hours a night before, c/o some discomfort otherwise no significant pain. He is alert and oriented X 4, able to converse appropriately. He is on dilaudid for pain. Otherwise no other significant speech, swallow or lateralizing weakness noted. Interval history / Subjective:        Talking with a friend. Hallucination resolved. No complaints. He is eager to go home,he said Dr Myriam Chambers told him he will be discharge tomorrow   Assessment & Plan:   # Intermittent hallucination possible hospital delirium vs sleep deprivation. REsolved  --Exam is nonfocal.  Medications reviewed. Discontinue scopolamine patch, discussed with surgical team who are okay. --Sleep hygiene. Increase trazodone. With antihistamines, anticholinergics,discussed with RN to avoid  --More hallucinatory night of 10/23. Received trazodone, IV Haldol and Ativan.   --Head CT negative. 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 Will sign off     Hospital Problems  Date Reviewed: 11/11/2020        Codes Class Noted POA    Severe protein-calorie malnutrition ([facility] ICD-10-CM: D67  ICD-9-CM: 350  10/13/2021 Yes        * (Principal) Sepsis ([facility].) ICD-10-CM: A41.9  ICD-9-CM: 038.9, 995.91  10/10/2021 Unknown                Review of Systems:   A comprehensive review of systems was negative except for that written in the HPI. Vital Signs:    Last 24hrs VS reviewed since prior progress note. Most recent are:  Visit Vitals  /81   Pulse 92   Temp 98.5 °F (36.9 °C)   Resp 17   Ht 6' (1.829 m)   Wt 73 kg (161 lb)   SpO2 97%   BMI 21.84 kg/m²         Intake/Output Summary (Last 24 hours) at 10/26/2021 1839  Last data filed at 10/26/2021 5883  Gross per 24 hour   Intake    Output 2700 ml   Net -2700 ml        Physical Examination:     I had a face to face encounter with this patient and independently examined them on10/26/2021 as outlined below:        Constitutional:  No acute distress, cooperative, pleasant    HEENT:  Atraumatic. Oral mucosa moist,. Non icteric sclera. No pallor. Resp:  CTA bilaterally. No wheezing/rhonchi/rales. No accessory muscle use   Chest Wall: No deformity   CV:  Regular rhythm, normal rate, no murmurs, gallops, rubs    GI:  Soft, non distended, non tender.  normoactive bowel sounds, no hepatosplenomegaly    :  No CVA or suprapubic tenderness    Musculoskeletal:  No edema, warm, 2+ pulses throughout    Neurologic:  Mental status:AAOx3, Cranial nerves II-XII : WNL  Motor exam:Moves all extremities symmetrically              Data Review:    Review and/or order of clinical lab test  Review and/or order of tests in the radiology section of CPT  Review and/or order of tests in the medicine section of CPT      Labs:     Recent Labs     10/25/21  0325   WBC 7.6   HGB 9.7*   HCT 30.7*   *     Recent Labs     10/25/21  0325   *   K 3.7      CO2 27   BUN 8   CREA 0.81   *   CA 8.6     Recent Labs     10/25/21  0325   ALT 19   AP 92   TBILI 0.3   TP 8.0   ALB 2.4*   GLOB 5.6*     No results for input(s): INR, PTP, APTT, INREXT, INREXT in the last 72 hours. No results for input(s): FE, TIBC, PSAT, FERR in the last 72 hours. No results found for: FOL, RBCF   No results for input(s): PH, PCO2, PO2 in the last 72 hours. No results for input(s): CPK, CKNDX, TROIQ in the last 72 hours.     No lab exists for component: CPKMB  No results found for: CHOL, CHOLX, CHLST, CHOLV, HDL, HDLP, LDL, LDLC, DLDLP, TGLX, TRIGL, TRIGP, CHHD, CHHDX  Lab Results   Component Value Date/Time    Glucose (POC) 123 (H) 10/25/2021 04:18 PM    Glucose (POC) 132 (H) 10/25/2021 11:27 AM    Glucose (POC) 129 (H) 10/24/2021 09:30 PM    Glucose (POC) 125 (H) 10/20/2021 09:21 PM    Glucose (POC) 77 10/14/2021 12:14 PM     Lab Results   Component Value Date/Time    Color DARK YELLOW 02/08/2016 08:22 PM    Appearance CLOUDY (A) 02/08/2016 08:22 PM    Specific gravity 1.026 02/08/2016 08:22 PM    pH (UA) 5.5 02/08/2016 08:22 PM    Protein 100 (A) 02/08/2016 08:22 PM    Glucose NEGATIVE  02/08/2016 08:22 PM    Ketone 40 (A) 02/08/2016 08:22 PM    Bilirubin LARGE (A) 08/16/2014 04:35 AM    Urobilinogen 1.0 02/08/2016 08:22 PM    Nitrites NEGATIVE  02/08/2016 08:22 PM    Leukocyte Esterase SMALL (A) 02/08/2016 08:22 PM    Epithelial cells FEW 02/08/2016 08:22 PM    Bacteria NEGATIVE  02/08/2016 08:22 PM    WBC 10-20 02/08/2016 08:22 PM    RBC 0-5 02/08/2016 08:22 PM Medications Reviewed:     Current Facility-Administered Medications   Medication Dose Route Frequency    amoxicillin-clavulanate (AUGMENTIN) 875-125 mg per tablet 1 Tablet  1 Tablet Oral Q12H    traZODone (DESYREL) tablet 100 mg  100 mg Oral QHS    dextrose 5% - 0.45% NaCl with KCl 20 mEq/L infusion  50 mL/hr IntraVENous CONTINUOUS    lipase-protease-amylase (CREON 12,000) capsule 2 Capsule  2 Capsule Oral TIDAC    0.9% sodium chloride infusion 250 mL  250 mL IntraVENous PRN    thiamine HCL (B-1) tablet 100 mg  100 mg Oral DAILY    vitamin E acetate capsule 400 Units  400 Units Oral QID    HYDROmorphone (DILAUDID) tablet 2 mg  2 mg Oral Q3H PRN    HYDROmorphone (DILAUDID) injection 1 mg  1 mg IntraVENous Q4H PRN    diphenhydrAMINE (BENADRYL) injection 12.5 mg  12.5 mg IntraVENous Q6H PRN    benzocaine-menthoL (CEPACOL) lozenge 1 Lozenge  1 Lozenge Mucous Membrane PRN    0.9% sodium chloride infusion 250 mL  250 mL IntraVENous PRN    sodium chloride (NS) flush 5-40 mL  5-40 mL IntraVENous Q8H    sodium chloride (NS) flush 5-40 mL  5-40 mL IntraVENous PRN    FLUoxetine (PROzac) capsule 40 mg  40 mg Oral DAILY    pantoprazole (PROTONIX) 40 mg in 0.9% sodium chloride 10 mL injection  40 mg IntraVENous DAILY    sodium chloride (NS) flush 5-40 mL  5-40 mL IntraVENous Q8H    sodium chloride (NS) flush 5-40 mL  5-40 mL IntraVENous PRN    acetaminophen (TYLENOL) tablet 650 mg  650 mg Oral Q6H PRN    Or    acetaminophen (TYLENOL) suppository 650 mg  650 mg Rectal Q6H PRN    polyethylene glycol (MIRALAX) packet 17 g  17 g Oral DAILY PRN    ondansetron (ZOFRAN ODT) tablet 4 mg  4 mg Oral Q8H PRN    Or    ondansetron (ZOFRAN) injection 4 mg  4 mg IntraVENous Q6H PRN    [Held by provider] enoxaparin (LOVENOX) injection 40 mg  40 mg SubCUTAneous DAILY     ______________________________________________________________________  EXPECTED LENGTH OF STAY: 9d 14h  ACTUAL LENGTH OF STAY: Oziel Rosales MD

## 2021-10-26 NOTE — PROGRESS NOTES
Bedside shift change report given to Jake Dao RN (oncoming nurse) by Soto Daniel RN (offgoing nurse). Report included the following information SBAR, Kardex, Intake/Output, MAR and Recent Results.

## 2021-10-26 NOTE — PROGRESS NOTES
Comprehensive Nutrition Assessment    Type and Reason for Visit: Reassess    Nutrition Recommendations/Plan:    1. Obtain standing scale weight  2. Follow-up outpatient for recheck of vitamin A & E serum levels   - maintenance dosing with DEKAs Essential Capsules (or equivalent fat-soluble vitamin supplement) for maintenance    Nutrition Assessment:    Pt admitted for sepsis. PMHx: bile duct stricture s/p choledochojejunostomy (2016), chronic pancreatitis. Bacteremia, depression, GERD, pancreatic cyst. Chronic issues with pancreatitis with handful of episodes each year. S/p open pancreatic fluid collection drainage on 10/15. Hopes for d/c home tomorrow on oral abx. Pt visited today. Happy to hear intake significantly improved from visit last week. He reporting eating essentially 100% with meals yesterday and did well with most of breakfast this morning. Taking Creon as needed based on meal side. Low vitamin A &E repleted. Will likely need maintenance supplementation - defer to outpatient GI/PCP pending lab trends outpatient. Discussed with pt and added to d/c paperwork for pt to also follow. UBW 160lbs, at home weight down to 150# (68.2kg) - indicates wt loss of 7%. Temporal and upper extremity wasting observed.      Malnutrition Assessment:  Malnutrition Status:  Severe malnutrition    Context:  Acute illness     Findings of the 6 clinical characteristics of malnutrition:   Energy Intake:  1 - 75% or less of est energy req for 7 or more days  Weight Loss:  1.00 - 7.5% over 3 months     Body Fat Loss:  7 - Moderate body fat loss, Buccal region   Muscle Mass Loss:  7 - Moderate muscle mass loss, Temples (temporalis)  Fluid Accumulation:  Unable to assess,     Strength:  Not performed     Nutritionally Significant Medications: augmentin, prozac, Creon 23183 (2capsules/meal), protonix, thiamine, zosyn, vit E, trazadone, D5 1/2 NaCl w/ KCL @ 50ml/hr    Estimated Daily Nutrient Needs:  Energy (kcal): 1977-2157kcal (MSJ x 1.2 x 1.1-1.2); Weight Used for Energy Requirements: Other (specify) (68kg)  Protein (g): 95-109g (1.4-1.6g/kg);  Weight Used for Protein Requirements: Other (specify) (68kg)  Fluid (ml/day): 2100; Method Used for Fluid Requirements: 1 ml/kcal    Nutrition Related Findings:       BM: 10/23  Edema: none  Wounds:  None       Current Nutrition Therapies:   Diet: low fat  Supplements: Magic Cup BID    Anthropometric Measures:  · Height:  6' (182.9 cm)  · Current Body Wt:  68 kg (150 lb)   · Admission Body Wt:  161 lb    · Usual Body Wt:        · Ideal Body Wt:  178:  84.3 %   Wt Readings from Last 10 Encounters:   10/11/21 73 kg (161 lb)   10/10/18 83 kg (183 lb)   08/31/18 83 kg (183 lb)   08/22/18 83 kg (183 lb)   07/12/17 82.6 kg (182 lb 3 oz)   02/08/17 82.1 kg (181 lb)   01/13/17 82.1 kg (181 lb)   03/16/16 83 kg (183 lb)   03/02/16 75.8 kg (167 lb)   02/18/16 81.2 kg (179 lb 0.2 oz)     Nutrition Diagnosis:   · Inadequate oral intake (improving) related to altered GI function (poor appetite) as evidenced by  (>75% meals)    · Impaired nutrient utilization, Altered nutrition-related lab values related to altered GI function as evidenced by lab values (PERT with chronic pancreatitis with low A&E)    · Severe malnutrition related to inadequate protein-energy intake, increased demand for energy/nutrients as evidenced by moderate muscle loss, moderate loss of subcutaneous fat, poor intake prior to admission    Nutrition Interventions:   Food and/or Nutrient Delivery: Continue current diet  Nutrition Education and Counseling: Education completed  Coordination of Nutrition Care: Continue to monitor while inpatient    Goals:  Consumption of at least 75% meals & wt maintenance in 5-7 days       Nutrition Monitoring and Evaluation:   Behavioral-Environmental Outcomes: None identified  Food/Nutrient Intake Outcomes: Food and nutrient intake, Supplement intake, Vitamin/mineral intake  Physical Signs/Symptoms Outcomes: Weight, GI status    Discharge Planning:    Continue current diet     Dilan Maldonado RD  CNS, Contact via [facility]

## 2021-10-26 NOTE — PROGRESS NOTES
General Surgery Daily Progress Note    Admit Date: 10/10/2021  Post-Operative Day: 12 Days Post-Op from Procedure(s):  LAPAROSCOPIC POSSIBLE OPEN DRAINAGE OF PANCREATIC ABCESS     Subjective:     Last 24 hrs: Rested well overnight--unsure if he recieved QHS trazodone. Tolertaing diet. No NV  No formed stools reports, +liquid stools, +flatus  Ambulating in halls. Hoping for d/c to home tomorrow as discussed w Dr. Jerrlyn Duverney. Objective:     Blood pressure 127/84, pulse 87, temperature 98.2 °F (36.8 °C), resp. rate 17, height 6' (1.829 m), weight 161 lb (73 kg), SpO2 98 %. Temp (24hrs), Av.2 °F (36.8 °C), Min:98.1 °F (36.7 °C), Max:98.2 °F (36.8 °C)      _____________________  Physical Exam:     Alert and Oriented, pleasant, in no acute distress. Cardiovascular: RRR, no peripheral edema  Lungs:CTAB   Abdomen: flat, NTND. Assessment:   Principal Problem:    Sepsis ([facility] (10/10/2021)    Active Problems:    Severe protein-calorie malnutrition ([facility] (10/13/2021)            Plan:     Transition to PO Augmentin  Continue low-fat diet  OOB, Ambulation as tolerated  PO analgesics as needed  Hopefully home tomorrow w office follow-up    Data Review:    Recent Labs     10/25/21  0325   WBC 7.6   HGB 9.7*   HCT 30.7*   *     Recent Labs     10/25/21  0325   *   K 3.7      CO2 27   *   BUN 8   CREA 0.81   CA 8.6   ALB 2.4*   ALT 19     No results for input(s): AML, LPSE in the last 72 hours.         ______________________  Medications:    Current Facility-Administered Medications   Medication Dose Route Frequency    traZODone (DESYREL) tablet 100 mg  100 mg Oral QHS    dextrose 5% - 0.45% NaCl with KCl 20 mEq/L infusion  50 mL/hr IntraVENous CONTINUOUS    lipase-protease-amylase (CREON 12,000) capsule 2 Capsule  2 Capsule Oral TIDAC    0.9% sodium chloride infusion 250 mL  250 mL IntraVENous PRN    thiamine HCL (B-1) tablet 100 mg  100 mg Oral DAILY    vitamin E acetate capsule 400 Units  400 Units Oral QID    HYDROmorphone (DILAUDID) tablet 2 mg  2 mg Oral Q3H PRN    HYDROmorphone (DILAUDID) injection 1 mg  1 mg IntraVENous Q4H PRN    diphenhydrAMINE (BENADRYL) injection 12.5 mg  12.5 mg IntraVENous Q6H PRN    benzocaine-menthoL (CEPACOL) lozenge 1 Lozenge  1 Lozenge Mucous Membrane PRN    0.9% sodium chloride infusion 250 mL  250 mL IntraVENous PRN    sodium chloride (NS) flush 5-40 mL  5-40 mL IntraVENous Q8H    sodium chloride (NS) flush 5-40 mL  5-40 mL IntraVENous PRN    FLUoxetine (PROzac) capsule 40 mg  40 mg Oral DAILY    pantoprazole (PROTONIX) 40 mg in 0.9% sodium chloride 10 mL injection  40 mg IntraVENous DAILY    piperacillin-tazobactam (ZOSYN) 3.375 g in 0.9% sodium chloride (MBP/ADV) 100 mL MBP  3.375 g IntraVENous Q8H    sodium chloride (NS) flush 5-40 mL  5-40 mL IntraVENous Q8H    sodium chloride (NS) flush 5-40 mL  5-40 mL IntraVENous PRN    acetaminophen (TYLENOL) tablet 650 mg  650 mg Oral Q6H PRN    Or    acetaminophen (TYLENOL) suppository 650 mg  650 mg Rectal Q6H PRN    polyethylene glycol (MIRALAX) packet 17 g  17 g Oral DAILY PRN    ondansetron (ZOFRAN ODT) tablet 4 mg  4 mg Oral Q8H PRN    Or    ondansetron (ZOFRAN) injection 4 mg  4 mg IntraVENous Q6H PRN    [Held by provider] enoxaparin (LOVENOX) injection 40 mg  40 mg SubCUTAneous DAILY       Guillermo Meza, [address]  10/26/2021

## 2021-10-27 VITALS
TEMPERATURE: 97.9 F | RESPIRATION RATE: 16 BRPM | OXYGEN SATURATION: 98 % | HEART RATE: 81 BPM | SYSTOLIC BLOOD PRESSURE: 115 MMHG | DIASTOLIC BLOOD PRESSURE: 78 MMHG | WEIGHT: 161 LBS | HEIGHT: 72 IN | BODY MASS INDEX: 21.81 KG/M2

## 2021-10-27 PROCEDURE — 74011250637 HC RX REV CODE- 250/637: Performed by: INTERNAL MEDICINE

## 2021-10-27 PROCEDURE — 74011250637 HC RX REV CODE- 250/637: Performed by: PHYSICIAN ASSISTANT

## 2021-10-27 PROCEDURE — 74011250637 HC RX REV CODE- 250/637: Performed by: NURSE PRACTITIONER

## 2021-10-27 PROCEDURE — 74011250637 HC RX REV CODE- 250/637: Performed by: SURGERY

## 2021-10-27 PROCEDURE — 74011250636 HC RX REV CODE- 250/636: Performed by: SURGERY

## 2021-10-27 PROCEDURE — 74011250636 HC RX REV CODE- 250/636: Performed by: NURSE PRACTITIONER

## 2021-10-27 PROCEDURE — 91300 HC RX REV CODE- 250/636: CPT | Performed by: SURGERY

## 2021-10-27 PROCEDURE — C9113 INJ PANTOPRAZOLE SODIUM, VIA: HCPCS | Performed by: NURSE PRACTITIONER

## 2021-10-27 RX ORDER — AMOXICILLIN AND CLAVULANATE POTASSIUM 500; 125 MG/1; MG/1
1 TABLET, FILM COATED ORAL EVERY 12 HOURS
Qty: 20 TABLET | Refills: 0 | Status: SHIPPED | OUTPATIENT
Start: 2021-10-27 | End: 2021-11-06

## 2021-10-27 RX ADMIN — FLUOXETINE 40 MG: 20 CAPSULE ORAL at 10:47

## 2021-10-27 RX ADMIN — PANCRELIPASE 1 CAPSULE: 60000; 12000; 38000 CAPSULE, DELAYED RELEASE PELLETS ORAL at 07:30

## 2021-10-27 RX ADMIN — VITAMIN E CAP 400 UNIT 400 UNITS: 400 CAP at 10:47

## 2021-10-27 RX ADMIN — RNA INGREDIENT BNT-162B2 0.3 ML: 0.23 INJECTION, SUSPENSION INTRAMUSCULAR at 10:40

## 2021-10-27 RX ADMIN — Medication 100 MG: at 10:47

## 2021-10-27 RX ADMIN — SODIUM CHLORIDE 40 MG: 9 INJECTION INTRAMUSCULAR; INTRAVENOUS; SUBCUTANEOUS at 10:47

## 2021-10-27 RX ADMIN — AMOXICILLIN AND CLAVULANATE POTASSIUM 1 TABLET: 875; 125 TABLET, FILM COATED ORAL at 10:47

## 2021-10-27 NOTE — PROGRESS NOTES
Bedside shift change report given to Radha Blanco RN (oncoming nurse) by Cole Bhardwaj RN (offgoing nurse). Report included the following information SBAR, Kardex, Intake/Output, MAR and Recent Results.

## 2021-10-27 NOTE — DISCHARGE SUMMARY
Physician Discharge Summary     Patient ID:  Yessica Aleman  [id number]  77 y.o.  1955    Allergies: Aspirin    Admit Date: 10/10/2021    Discharge Date: 10/27/2021    * Admission Diagnoses: Sepsis ([facility] [A41.9]   Infeted pancreatic pseudocyst    * Discharge Diagnoses:    Hospital Problems as of 10/27/2021 Date Reviewed: 11/11/2020        Codes Class Noted - Resolved POA    Severe protein-calorie malnutrition ([facility] ICD-10-CM: E43  ICD-9-CM: 212  10/13/2021 - Present Yes        * (Principal) Sepsis ([facility] ICD-10-CM: A41.9  ICD-9-CM: 038.9, 995.91  10/10/2021 - Present Unknown               Admission Condition: Poor    * Discharge Condition: improved    * Procedures: Procedure(s):  [address] Course:   Admitted with a septic picture. CT showed several abscesses in the region of the pancreas in the lesser sac. Most were drained laparoscopically with resolution of fever and WBC elevation. There is still one 3 cm collection behind the antrum, which seems to be responding to the antibiotics. Culture of the abscess grew Streptococcus Constellatus, sensitive to everything. After 2 weeks of IV Zosyn sent home on 10 days of Augmentin. He did have several days of delirium preceeding discharge, most likely due to sleep deprivation. This has cleared     Consults: Gastroenterology and Hospitalist    Significant Diagnostic Studies: radiology: CT scan:     * Disposition: Home    Discharge Medications:   Current Discharge Medication List      START taking these medications    Details   amoxicillin-clavulanate (AUGMENTIN) 500-125 mg per tablet Take 1 Tablet by mouth every twelve (12) hours for 10 days. Indications: infection of the biliary tract  Qty: 20 Tablet, Refills: 0  Start date: 10/27/2021, End date: 11/6/2021         CONTINUE these medications which have NOT CHANGED    Details   aspirin delayed-release 81 mg tablet Take  by mouth daily.       !! LIPASE/PROTEASE/AMYLASE (CREON PO) Take  by mouth. 44237 units po with a small meal.      !! LIPASE/PROTEASE/AMYLASE (CREON PO) Take  by mouth. 03286 units po with a snack. omeprazole (PRILOSEC) 20 mg capsule Take 20 mg by mouth daily. FLUoxetine (PROZAC) 20 mg tablet Take 20 mg by mouth daily. !! LIPASE/PROTEASE/AMYLASE (CREON PO) Take  by mouth. 16989 units po with large meal.       !! - Potential duplicate medications found. Please discuss with provider. * Follow-up Care/Patient Instructions:   Activity: Activity as tolerated  Diet: Regular Diet  Wound Care: Keep wound clean and dry    Follow-up Information     Follow up With Specialties Details Why Contact Info    Stuart Wei  [address]  [address] [facility]  [phone]          Follow-up tests/labs none    Signed:  Buck Pelayo MD  10/27/2021  8:17 AM

## 2021-10-27 NOTE — PROGRESS NOTES
RUR: 10%     ARAM: Home w/ wife. Patient's wife to provide transport home. Follow-up with PCP/specialist.     POD#12 drainage of pancreatic abscess. Patient scheduled and medically cleared for discharge today, 10/27.      Primary Contact: Wife, Washington Pérez, [phone]    Medicare pt has received, reviewed, and signed 2nd IM letter informing them of their right to appeal the discharge. Signed copied has been placed on pt bedside chart.     Ranulfo Thomas, VEL   [phone]

## 2021-10-27 NOTE — PROGRESS NOTES
Physician Progress Note      PATIENT:               Meka Mcdonald  CSN #:                  [id number]  :                       1955  ADMIT DATE:       10/10/2021 6:37 PM  [address] [place] DATE:        10/27/2021 11:15 AM  RESPONDING  PROVIDER #:        Alicia Rangel MD          QUERY TEXT:    Dear Attending,    Pt admitted with sepsis and pancreatic abscess, noted to have Hgb 6.4 on 10/20. If possible, please document in the progress notes and discharge summary if you are evaluating and/or treating any of the following: The medical record reflects the following:  Risk Factors: chronic pancreatitis with abscess  Clinical Indicators: pt admitted with sepsis and pancreatic abscess  - noted to have Hgb 6.4 on 10/20 with transfusion 1 unit PRBC  - 10/20 PN: Hgb this evening is 6.0  If continues to drop will transfuse.  - 10/21 PN: Hgb up 7.7 with receiving a unit of RBC's yesterday evening. Treatment: 1 unit PRBC, monitoring      Thank you,    Gwendel Kussmaul, RN  [facility]  [phone]  Options provided:  -- Acute blood loss anemia  -- Chronic blood loss anemia  -- Acute on chronic blood loss anemia  -- Iron deficiency anemia  -- Dilutional anemia  -- Precipitous drop in Hemoglobin  -- Other - I will add my own diagnosis  -- Disagree - Not applicable / Not valid  -- Disagree - Clinically unable to determine / Unknown  -- Refer to Clinical Documentation Reviewer    PROVIDER RESPONSE TEXT:    Anemia is due to dilution.     Query created by: Virgilio Mandel on 10/22/2021 10:50 AM      Electronically signed by:  Alicia Rangel MD 10/27/2021 3:56 PM

## 2021-10-27 NOTE — PROGRESS NOTES
Confirmed patient in agreement to receiving covid 19 vaccine prior to discharge. Dr Loyd Arrington in agreement patient to have J&J vaccine as patient requested. Patient A&Ox4. Patient changed his mind, requested the Jerel Harris vaccine instead and signed consent form. Patients VINCE Schreiber updated and in agreement. Patient received the Jerel Harris vaccine without complications. Patient and wife provided list of business for second vaccine. Vaccine RN remained with patient five minutes following administration and unit VINCE Schreiber notified patient able to prepare for discharge.  Catarina Taylor RN Covid Vaccine Team

## 2021-10-27 NOTE — DISCHARGE INSTRUCTIONS
Patient Discharge Instructions    Carly Rasheed / [id number] : 1955    Admitted 10/10/2021 Discharged: 10/27/2021     Take Home Medications            · It is important that you take the medication exactly as they are prescribed. · Keep your medication in the bottles provided by the pharmacist and keep a list of the medication names, dosages, and times to be taken in your wallet. · Do not take other medications without consulting your doctor. What to do at Home    Recommended diet: Regular Diet,     Recommended activity: Activity as tolerated, may shower whenever you wish          Follow-up Appointments   Procedures    FOLLOW UP VISIT Appointment in: Two Weeks Please call [phone] to schedule     Please call [phone] to schedule     Standing Status:   Standing     Number of Occurrences:   1     Order Specific Question:   Appointment in     Answer: Two Weeks           Information obtained by :  I understand that if any problems occur once I am at home I am to contact my physician. I understand and acknowledge receipt of the instructions indicated above.                                                                                                                                            Physician's or R.N.'s Signature                                                                  Date/Time                                                                                                                                              Patient or Representative Signature                                                          Date/Time

## 2021-10-27 NOTE — PROGRESS NOTES
Bedside shift change report given to Mega Suh (oncoming nurse) by Sidra Joseph RN (offgoing nurse). Report included the following information SBAR, Kardex, Procedure Summary, Intake/Output, MAR and Recent Results.

## 2021-11-22 ENCOUNTER — OFFICE VISIT (OUTPATIENT)
Dept: SURGERY | Age: 66
End: 2021-11-22
Payer: MEDICARE

## 2021-11-22 VITALS
TEMPERATURE: 98.3 F | RESPIRATION RATE: 18 BRPM | WEIGHT: 158.8 LBS | DIASTOLIC BLOOD PRESSURE: 73 MMHG | BODY MASS INDEX: 21.51 KG/M2 | OXYGEN SATURATION: 97 % | HEIGHT: 72 IN | SYSTOLIC BLOOD PRESSURE: 122 MMHG | HEART RATE: 79 BPM

## 2021-11-22 DIAGNOSIS — K86.1 CHRONIC BILIARY PANCREATITIS (HCC): Primary | ICD-10-CM

## 2021-11-22 PROCEDURE — 99024 POSTOP FOLLOW-UP VISIT: CPT | Performed by: SURGERY

## 2021-11-22 NOTE — LETTER
11/30/2021    Patient: Neo Persaud. YOB: 1955   Date of Visit: 11/22/2021     Cj Tiwari MD  [address] [place] [address]  Via Fax: [phone]    Dear Cj Tiwari MD,      Thank you for referring Mr. Angel Bay to [facility] [address] for evaluation. My notes for this consultation are attached. If you have questions, please do not hesitate to call me. I look forward to following your patient along with you.       Sincerely,    Buck Pelayo MD

## 2021-11-22 NOTE — LETTER
11/30/2021 2:48 PM    Patient:  Jennifer Espinosa. YOB: 1955  Date of Visit: 11/22/2021      Dear   No Recipients: Thank you for referring Mr. Sudeep Eric to me for evaluation/treatment. Below are the relevant portions of my assessment and plan of care. If you have questions, please do not hesitate to call me. I look forward to following Mr. Joan Mcgovern along with you.         Sincerely,      Blanca Guo MD

## 2021-11-22 NOTE — PROGRESS NOTES
1. Have you been to the ER, urgent care clinic since your last visit? Hospitalized since your last visit? Yes [facility] 10/10/21 - 10/27/21  For pancreatic abscess. 2. Have you seen or consulted any other health care providers outside of the [address] [place] [address] since your last visit? Include any pap smears or colon screening.  no

## 2022-01-26 ENCOUNTER — HOSPITAL ENCOUNTER (OUTPATIENT)
Dept: CT IMAGING | Age: 67
Discharge: HOME | End: 2022-01-26
Attending: SURGERY
Payer: MEDICARE

## 2022-01-26 DIAGNOSIS — K86.1 CHRONIC BILIARY PANCREATITIS (HCC): ICD-10-CM

## 2022-01-26 PROCEDURE — 74170 CT ABD WO CNTRST FLWD CNTRST: CPT

## 2022-01-26 PROCEDURE — 74011000636 HC RX REV CODE- 636: Performed by: RADIOLOGY

## 2022-01-26 RX ADMIN — IOPAMIDOL 100 ML: 755 INJECTION, SOLUTION INTRAVENOUS at 14:36

## 2022-02-07 ENCOUNTER — OFFICE VISIT (OUTPATIENT)
Dept: SURGERY | Age: 67
End: 2022-02-07
Payer: MEDICARE

## 2022-02-07 VITALS
HEIGHT: 72 IN | BODY MASS INDEX: 22.05 KG/M2 | WEIGHT: 162.8 LBS | OXYGEN SATURATION: 97 % | TEMPERATURE: 98.7 F | SYSTOLIC BLOOD PRESSURE: 114 MMHG | DIASTOLIC BLOOD PRESSURE: 77 MMHG | HEART RATE: 95 BPM | RESPIRATION RATE: 18 BRPM

## 2022-02-07 DIAGNOSIS — K86.1 CHRONIC BILIARY PANCREATITIS (HCC): Primary | ICD-10-CM

## 2022-02-07 PROCEDURE — G8510 SCR DEP NEG, NO PLAN REQD: HCPCS | Performed by: SURGERY

## 2022-02-07 PROCEDURE — 3017F COLORECTAL CA SCREEN DOC REV: CPT | Performed by: SURGERY

## 2022-02-07 PROCEDURE — 99212 OFFICE O/P EST SF 10 MIN: CPT | Performed by: SURGERY

## 2022-02-07 PROCEDURE — G8420 CALC BMI NORM PARAMETERS: HCPCS | Performed by: SURGERY

## 2022-02-07 PROCEDURE — 1101F PT FALLS ASSESS-DOCD LE1/YR: CPT | Performed by: SURGERY

## 2022-02-07 PROCEDURE — G8427 DOCREV CUR MEDS BY ELIG CLIN: HCPCS | Performed by: SURGERY

## 2022-02-07 PROCEDURE — G8536 NO DOC ELDER MAL SCRN: HCPCS | Performed by: SURGERY

## 2022-02-07 NOTE — LETTER
2/7/2022 3:19 PM    Patient:  Ruth Graff. YOB: 1955  Date of Visit: 2/7/2022      Dear   No Recipients: Thank you for referring Mr. Amada Jj to me for evaluation/treatment. Below are the relevant portions of my assessment and plan of care. If you have questions, please do not hesitate to call me. I look forward to following Mr. Melony Lopez along with you.         Sincerely,      Freya Penn MD

## 2022-02-07 NOTE — PROGRESS NOTES
Subjective:      Gil Sosa  is a 77 y.o. male presents for f/u of laparoscopic drainage of pancreatic abscess on 10/14/21. Pt's last ABD CT on 1/26/22 showed interval resolution of pseudocysts with some persistent perigastric and gastric wall edema along the lesser curvature of the stomach. The scan also showed no change in sequela of chronic pancreatitis with pancreatic duct dilation and stones, pancreatic parenchymal atrophy or parenchymal calcifications. Of note, CT showed concern for 12 x 15 mm mass in wall of terminal ileum w/ immediately adjacent irregular soft tissue 10 x13  mm soft tissue abutting the serosal surface of associated ileum. Today, pt notes no further episodes of pancreatitis or pancreatic pain. HISTORY:  Pt initially seen in January 2016 for evaluation of bile duct issues. pt reported issues started in 1996 and has undergone mutliple stent placements with Dr. Desirae Powell for bile duct stricture.     Pt underwent choledochojejunostomy and cholecystectomy on 02/01/16. Final surgical PATH: chronic cholecystitis and metallic stent.      Pt developed small bowel obstruction seconday to adhesions and necrosis of the proximal portion of the alimentary portion of Frankie  limb and jejunojejunostomy. This required exploratory laparotomy and rescetion of the necrotic proximal small bowel and oversewing of the  enteroenterotomy closure on 02/09/16.      Pt returned in February 2017 with pancreatic duct obstruction. Pt was considered for possible PEUSTOW procedure following EUS and biopsy.      Pt has undergone multiple EUS and FNAs which have been negative for malignancy.     Pt was admitted on 10/10/21 for sepsis and infected pancreatic pseudocyst. CT scan at this time showed multiloculated complex and septated collection adjacent to the tail of the pancreas and the gastric fundus, not amenable to percutaneous drainage. See full reports for details.  Pt underwent laparoscopic drainage of pancreatic abscess on 10/14/21. Culture of the abscess grew Streptococcus Constellatus, sensitive to everything. After 2 weeks of IV Zosyn sent home on 10 days of Augmentin. Pt was discharged on 10/27/21.      Today, pt reports feeling better and has gained weight. He notes his appetite and bowel function are normal. Denies abdominal pain, fever, or chills. laparoscopic drainage of pancreatic abscess on 10/14/21.      Pt initially seen in January 2016 for evaluation of bile duct issues. pt reported issues started in 1996 and has undergone mutliple stent placements with Dr. Martina Ruiz for bile duct stricture.     Pt underwent choledochojejunostomy and cholecystectomy on 02/01/16. Final surgical PATH: chronic cholecystitis and metallic stent.      Pt developed small bowel obstruction seconday to adhesions and necrosis of the proximal portion of the alimentary portion of Frankie  limb and jejunojejunostomy. This required exploratory laparotomy and rescetion of the necrotic proximal small bowel and oversewing of the  enteroenterotomy closure on 02/09/16.      Pt returned in February 2017 with pancreatic duct obstruction. Pt was considered for possible PEUSTOW procedure following EUS and biopsy.      Pt has undergone multiple EUS and FNAs which have been negative for malignancy.     Pt was admitted on 10/10/21 for sepsis and infected pancreatic pseudocyst. CT scan at this time showed multiloculated complex and septated collection adjacent to the tail of the pancreas and the gastric fundus, not amenable to percutaneous drainage. See full reports for details. Pt underwent laparoscopic drainage of pancreatic abscess on 10/14/21. Culture of the abscess grew Streptococcus Constellatus, sensitive to everything. After 2 weeks of IV Zosyn sent home on 10 days of Augmentin. Pt was discharged on 10/27/21.      Past Medical History:   Diagnosis Date    Adverse effect of anesthesia     woke up during ERCP - gasping    Bacteremia 08/2014 Bacteremia: 2/3 bottles with Klebsiella Oxytoca, pansusceptible and Streptococcus species    Bile duct stricture 2016    s/p choledochojejunostomy on 2/1/2016 for biliary stricture    Chronic pancreatitis ([address]     Depression     Dilated pancreatic duct     CT of the abdomen/pelvis on 8/31/2018 showed persistent pancreatic atrophy and pancreatic ductal dilatation to 1 cm. Punctate calcifications in the pancreatic head are unchanged. New 1.5 x 1.4 x 1.4 cm hypodense structure in the inferior medial pancreatic head suggesting a cyst    GERD (gastroesophageal reflux disease)     Thompson's Esophagus    Insomnia     Pancreatic cyst     CT of the abdomen/pelvis on 8/31/2018 showed persistent pancreatic atrophy and pancreatic ductal dilatation to 1 cm. Punctate calcifications in the pancreatic head are unchanged. New 1.5 x 1.4 x 1.4 cm hypodense structure in the inferior medial pancreatic head        Past Surgical History:   Procedure Laterality Date    HX CHOLECYSTECTOMY  02/01/2016    Cholecystectomy and choledochojejunostomy for biliary stricture on 2/1/2016; also had removal of  inlying metal stent for CBD stricture, by Dr Alec Limon      Age 2, had left inguinal hernia repair    HX ORTHOPAEDIC      ganglion cyst removed left wrist x 2    HX ORTHOPAEDIC      left knee fracture 2013-1/30    HX ORTHOPAEDIC      meniscus repair (2) on left knee    HX OTHER SURGICAL      ERCPS -multiple stents placement and removal since 1995 most due to biliary stricture; last ERCP with stent 4/2015    HX SMALL BOWEL RESECTION  02/09/2016    1 week after Cholecystectomy and choledochojejunostomy, small-bowel obstruction secondary to adhesions and necrosis of the most proximal portion of the alimentary portion of Frankie limb and jejunojejunostomy. Patient had ex lap with  Resection of the necrotic proximal small bowel and oversewing of the enteroenterotomy closure.            HX TONSILLECTOMY      VASCULAR SURGERY PROCEDURE UNLIST      vein stripped from left leg       Social History     Tobacco Use    Smoking status: Former Smoker     Packs/day: 0.50     Years: 20.00     Pack years: 10.00    Smokeless tobacco: Never Used   Substance Use Topics    Alcohol use: Not Currently     Comment: History of heavy alcohol use: 6 beers on most days x30 years. Stopped in his 46s. Family History   Problem Relation Age of Onset    Stroke Mother         ?  Stroke Father     Other Brother         Agent Orange    Stroke Brother     Alcohol abuse Maternal Uncle     Anesth Problems Neg Hx        Current Outpatient Medications on File Prior to Visit   Medication Sig Dispense Refill    aspirin delayed-release 81 mg tablet Take  by mouth daily.  LIPASE/PROTEASE/AMYLASE (CREON PO) Take  by mouth. 51749 units po with a small meal.      LIPASE/PROTEASE/AMYLASE (CREON PO) Take  by mouth. 97915 units po with a snack.  omeprazole (PRILOSEC) 20 mg capsule Take 20 mg by mouth daily.  FLUoxetine (PROZAC) 20 mg tablet Take 20 mg by mouth daily.  LIPASE/PROTEASE/AMYLASE (CREON PO) Take  by mouth. 87616 units po with large meal.       No current facility-administered medications on file prior to visit. Allergies   Allergen Reactions    Aspirin Other (comments)     Does not take d/t chronic pancreatitis. Pt has begun to take  81 mg ASA due to 'blockages' in his heart.        Review of Systems:  Constitutional: No fever or chills  Neurologic: No headache  Eyes: No scleral icterus or irritated eyes  Nose: No nasal pain or drainage  Mouth: No oral lesions or sore throat  Cardiac: No palpations or chest pain  Pulmonary: No cough or shortness or breath  Gastrointestinal: GERD  Genitourinary: No dysuria  Musculoskeletal: No muscle or joint tenderness  Skin: No rashes or lesions  Psychiatric: Depression    Objective:     Visit Vitals  /77   Pulse 95   Temp 98.7 °F (37.1 °C) (Oral)   Resp 18   Ht 6' (1.829 m)   Wt 162 lb 12.8 oz (73.8 kg)   SpO2 97%   BMI 22.08 kg/m²        Physical Exam:  General: No acute distress, conversant  Eyes: PERRLA, no scleral icterus  HENT: Normocephalic without oral lesions  Neck: Trachea midline without LAD  Cardiac: Normal pulse rate and rhythm  Pulmonary: Symmetric chest rise with normal effort  Abdomen: soft and nontender abdomen, small incisional hernia, nontender on palpation    Skin: Warm without rash  Extremities: No edema or joint stiffness  Psych: Appropriate mood and affect    Labs: No results found for this or any previous visit (from the past 24 hour(s)). Data Review: All previous imaging, testing and lab work was reviewed and interpreted. CT ABD W WO CONT on 1/26/22. IMPRESSION:  1. Interval resolution of previously seen pseudocysts with some persistent  perigastric and gastric wall edema along the lesser curvature of the stomach. 2. No significant change in sequela of chronic pancreatitis with pancreatic duct  dilation and stones, pancreatic parenchymal atrophy and parenchymal  calcifications.   3. Concern for 12 x 15 mm mass in the wall of the terminal ileum with  immediately adjacent irregular 10 x 13 mm soft tissue abutting the serosal  surface of the associated ileum. Assessment and Plan:       ICD-10-CM ICD-9-CM    1. Chronic biliary pancreatitis (HCC)  K86.1 577.1        Will discuss with Dr. Harsh Ghotra for possible colonoscopy for further evaluation or thickening wall of terminal ileum. The patient indicates understanding of these issues and agrees with the plan. Total face to face time with patient: 15 minutes. Greater than 50% of the time was spent in counseling. This note was scribed by Ilia Allison as dictated by Dr. Lawyer Jaeger.        Signed By: Geno Ferrer MD     02/07/22

## 2022-02-07 NOTE — PROGRESS NOTES
1. Have you been to the ER, urgent care clinic since your last visit? Hospitalized since your last visit? no    2. Have you seen or consulted any other health care providers outside of the [address] [place] [address] since your last visit? Include any pap smears or colon screening.  no

## 2022-02-07 NOTE — LETTER
2/7/2022    Patient: Mile Gutierrez. YOB: 1955   Date of Visit: 2/7/2022     Jenny Coello MD  [address] [address]  Via Fax: [phone]    Dear Jenny Coello MD,      Thank you for referring Mr. Sakshi Bales to [facility] [address] [facility] for evaluation. My notes for this consultation are attached. If you have questions, please do not hesitate to call me. I look forward to following your patient along with you.       Sincerely,    Adair Purdy MD

## 2022-03-18 PROBLEM — E43 SEVERE PROTEIN-CALORIE MALNUTRITION (HCC): Status: ACTIVE | Noted: 2021-10-13

## 2022-03-18 PROBLEM — A41.9 SEPSIS (HCC): Status: ACTIVE | Noted: 2021-10-10

## 2022-03-19 PROBLEM — K86.89 PANCREATIC DUCT OBSTRUCTION: Status: ACTIVE | Noted: 2017-02-08

## 2022-03-19 PROBLEM — K43.2 INCISIONAL HERNIA, WITHOUT OBSTRUCTION OR GANGRENE: Status: ACTIVE | Noted: 2018-08-22

## 2023-05-08 ENCOUNTER — ANESTHESIA (OUTPATIENT)
Facility: HOSPITAL | Age: 68
End: 2023-05-08
Payer: MEDICARE

## 2023-05-08 ENCOUNTER — HOSPITAL ENCOUNTER (OUTPATIENT)
Facility: HOSPITAL | Age: 68
Setting detail: OUTPATIENT SURGERY
Discharge: HOME | End: 2023-05-08
Attending: INTERNAL MEDICINE | Admitting: INTERNAL MEDICINE
Payer: MEDICARE

## 2023-05-08 ENCOUNTER — ANESTHESIA EVENT (OUTPATIENT)
Facility: HOSPITAL | Age: 68
End: 2023-05-08
Payer: MEDICARE

## 2023-05-08 VITALS
HEART RATE: 63 BPM | WEIGHT: 159.7 LBS | TEMPERATURE: 98.6 F | SYSTOLIC BLOOD PRESSURE: 140 MMHG | HEIGHT: 72 IN | OXYGEN SATURATION: 99 % | BODY MASS INDEX: 21.63 KG/M2 | RESPIRATION RATE: 19 BRPM | DIASTOLIC BLOOD PRESSURE: 80 MMHG

## 2023-05-08 PROCEDURE — 6370000000 HC RX 637 (ALT 250 FOR IP): Performed by: INTERNAL MEDICINE

## 2023-05-08 PROCEDURE — 88305 TISSUE EXAM BY PATHOLOGIST: CPT

## 2023-05-08 PROCEDURE — 2709999900 HC NON-CHARGEABLE SUPPLY: Performed by: INTERNAL MEDICINE

## 2023-05-08 PROCEDURE — 3600007501: Performed by: INTERNAL MEDICINE

## 2023-05-08 PROCEDURE — 3700000000 HC ANESTHESIA ATTENDED CARE: Performed by: INTERNAL MEDICINE

## 2023-05-08 PROCEDURE — 6360000002 HC RX W HCPCS: Performed by: NURSE ANESTHETIST, CERTIFIED REGISTERED

## 2023-05-08 PROCEDURE — 3700000001 HC ADD 15 MINUTES (ANESTHESIA): Performed by: INTERNAL MEDICINE

## 2023-05-08 PROCEDURE — 3600007511: Performed by: INTERNAL MEDICINE

## 2023-05-08 PROCEDURE — 7100000011 HC PHASE II RECOVERY - ADDTL 15 MIN: Performed by: INTERNAL MEDICINE

## 2023-05-08 PROCEDURE — 2500000003 HC RX 250 WO HCPCS: Performed by: NURSE ANESTHETIST, CERTIFIED REGISTERED

## 2023-05-08 PROCEDURE — 2580000003 HC RX 258: Performed by: INTERNAL MEDICINE

## 2023-05-08 PROCEDURE — 7100000010 HC PHASE II RECOVERY - FIRST 15 MIN: Performed by: INTERNAL MEDICINE

## 2023-05-08 RX ORDER — SODIUM CHLORIDE 9 MG/ML
25 INJECTION, SOLUTION INTRAVENOUS PRN
Status: DISCONTINUED | OUTPATIENT
Start: 2023-05-08 | End: 2023-05-08 | Stop reason: HOSPADM

## 2023-05-08 RX ORDER — SODIUM CHLORIDE 0.9 % (FLUSH) 0.9 %
5-40 SYRINGE (ML) INJECTION PRN
Status: DISCONTINUED | OUTPATIENT
Start: 2023-05-08 | End: 2023-05-08 | Stop reason: HOSPADM

## 2023-05-08 RX ORDER — SODIUM CHLORIDE 0.9 % (FLUSH) 0.9 %
5-40 SYRINGE (ML) INJECTION EVERY 12 HOURS SCHEDULED
Status: DISCONTINUED | OUTPATIENT
Start: 2023-05-08 | End: 2023-05-08 | Stop reason: HOSPADM

## 2023-05-08 RX ORDER — LIDOCAINE HYDROCHLORIDE 20 MG/ML
INJECTION, SOLUTION EPIDURAL; INFILTRATION; INTRACAUDAL; PERINEURAL PRN
Status: DISCONTINUED | OUTPATIENT
Start: 2023-05-08 | End: 2023-05-08 | Stop reason: SDUPTHER

## 2023-05-08 RX ORDER — SIMETHICONE 20 MG/.3ML
EMULSION ORAL PRN
Status: DISCONTINUED | OUTPATIENT
Start: 2023-05-08 | End: 2023-05-08 | Stop reason: ALTCHOICE

## 2023-05-08 RX ORDER — HYDROMORPHONE HYDROCHLORIDE 2 MG/1
2 TABLET ORAL EVERY 6 HOURS PRN
COMMUNITY

## 2023-05-08 RX ORDER — SODIUM CHLORIDE 9 MG/ML
INJECTION, SOLUTION INTRAVENOUS CONTINUOUS
Status: DISCONTINUED | OUTPATIENT
Start: 2023-05-08 | End: 2023-05-08 | Stop reason: HOSPADM

## 2023-05-08 RX ORDER — SIMETHICONE 20 MG/.3ML
EMULSION ORAL
Status: DISCONTINUED
Start: 2023-05-08 | End: 2023-05-08 | Stop reason: HOSPADM

## 2023-05-08 RX ADMIN — LIDOCAINE HYDROCHLORIDE 50 MG: 20 INJECTION, SOLUTION EPIDURAL; INFILTRATION; INTRACAUDAL; PERINEURAL at 13:54

## 2023-05-08 RX ADMIN — SODIUM CHLORIDE: 9 INJECTION, SOLUTION INTRAVENOUS at 13:51

## 2023-05-08 RX ADMIN — PROPOFOL 70 MG: 10 INJECTION, EMULSION INTRAVENOUS at 13:54

## 2023-05-08 RX ADMIN — PROPOFOL 100 MG: 10 INJECTION, EMULSION INTRAVENOUS at 14:06

## 2023-05-08 RX ADMIN — PROPOFOL 50 MG: 10 INJECTION, EMULSION INTRAVENOUS at 14:01

## 2023-05-08 RX ADMIN — PROPOFOL 50 MG: 10 INJECTION, EMULSION INTRAVENOUS at 13:57

## 2023-05-08 RX ADMIN — PROPOFOL 50 MG: 10 INJECTION, EMULSION INTRAVENOUS at 14:03

## 2023-05-08 NOTE — DISCHARGE INSTRUCTIONS
[address]  [address]  [id number]  1955    COLON DISCHARGE INSTRUCTIONS    DISCOMFORT:  Redness at IV site- apply warm compress to area; if redness or soreness persist- contact your physician  There may be a slight amount of blood passed from the rectum  Gaseous discomfort- walking, belching will help relieve any discomfort      DIET:   High fiber diet. - however -  remember your colon is empty and a heavy meal will produce gas. Avoid these foods:  vegetables, fried / greasy foods, carbonated drinks for today  You may not  drink alcoholic beverages for at least 12 hours     ACTIVITY:  It is recommended that you spend the remainder of the day resting -  avoid any strenuous activity. You may not operate a vehicle for 12 hours  You may not  engage in an occupation involving machinery or appliances for rest of today    Avoid making any critical decisions for at least 24 hour    CALL M.D. ANY SIGN OF:   Increasing pain, nausea, vomiting  Abdominal distension (swelling)  New increased bleeding (oral or rectal)  Fever (chills)  Pain in chest area  Bloody discharge from nose or mouth  Shortness of breath    You may not  take any Advil, Aspirin, Ibuprofen, Motrin, Aleve, or Goodys for 7 days, ONLY  Tylenol as needed for pain. Post procedure diagnosis: [unfilled]      Post-procedure recommendations:  -Await pathology. -Repeat colonoscopy in 3 years.  -High fiber diet.     -Resume normal medication(s). -NO aspirin for 7 days   -Office visit in 3 months    Follow-up Instructions:    Call Dr. Gissell Rodriguez for any questions or problems. If we took a biopsy please call the office within 2 weeks to discuss your  pathology results. Telephone # [phone]         Learning About Coronavirus [phone])  Coronavirus [phone]): Overview  What is coronavirus (COVID-19)?   The coronavirus disease

## 2023-05-08 NOTE — ANESTHESIA PRE PROCEDURE
found for: PREGTESTUR, PREGSERUM, HCG, HCGQUANT     ABGs: No results found for: PHART, PO2ART, ZNN6XFH, USE7UAZ, BEART, Y3YOPJDI     Type & Screen (If Applicable):  No results found for: LABABO, LABRH    Drug/Infectious Status (If Applicable):  No results found for: HIV, HEPCAB    COVID-19 Screening (If Applicable):   Lab Results   Component Value Date/Time    COVID19 Not detected 10/14/2021 09:21 AM    COVID19 Not Detected 11/11/2020 11:50 AM           Anesthesia Evaluation  Patient summary reviewed and Nursing notes reviewed  Airway: Mallampati: I          Dental: normal exam         Pulmonary: breath sounds clear to auscultation                             Cardiovascular:  Exercise tolerance: good (>4 METS),   (+) hypertension:,         Rhythm: regular                      Neuro/Psych:   (+) psychiatric history:            GI/Hepatic/Renal:   (+) GERD:,           Endo/Other:                     Abdominal:             Vascular: Other Findings:           Anesthesia Plan      MAC     ASA 2       Induction: intravenous. Anesthetic plan and risks discussed with patient.         Attending anesthesiologist reviewed and agrees with Preprocedure content                Arin Cannon MD   5/8/2023

## 2023-05-08 NOTE — H&P
[address]  [address], [address]  [phone]                                History and Physical     NAME: Benjamin Benton. :  1955   MRN:  [id number]     HPI:  The patient was seen and examined. Past Surgical History:   Procedure Laterality Date    CHOLECYSTECTOMY  2016    Cholecystectomy and choledochojejunostomy for biliary stricture on 2016; also had removal of  inlying metal stent for CBD stricture, by Dr Reggie Benton      Age 2, had left inguinal hernia repair    ORTHOPEDIC SURGERY      ganglion cyst removed left wrist x 2    ORTHOPEDIC SURGERY      meniscus repair (2) on left knee    ORTHOPEDIC SURGERY      left knee fracture -    OTHER SURGICAL HISTORY      ERCPS -multiple stents placement and removal since  most due to biliary stricture; last ERCP with stent 2015    SMALL INTESTINE SURGERY  2016    1 week after Cholecystectomy and choledochojejunostomy, small-bowel obstruction secondary to adhesions and necrosis of the most proximal portion of the alimentary portion of Carloz limb and jejunojejunostomy. Patient had ex lap with  Resection of the necrotic proximal small bowel and oversewing of the enteroenterotomy closure. TONSILLECTOMY      VASCULAR SURGERY      vein stripped from left leg     Past Medical History:   Diagnosis Date    Adverse effect of anesthesia     woke up during ERCP - gasping    Bacteremia 2014    Bacteremia: 2/3 bottles with Klebsiella Oxytoca, pansusceptible and Streptococcus species    Bile duct stricture     s/p choledochojejunostomy on 2016 for biliary stricture    Chronic pancreatitis ([address]     Depression     Dilated pancreatic duct     CT of the abdomen/pelvis on 2018 showed persistent pancreatic atrophy and pancreatic ductal dilatation to 1 cm. Punctate calcifications in the pancreatic head are unchanged.  New 1.5 x 1.4 x 1.4 cm

## 2023-05-08 NOTE — OP NOTE
[address] [facility]  [address] [address] [address], [address]  [phone]                              Colonoscopy Procedure Note      Indications:    Personal history of colon polyps (screening only)     :  Meme Louise MD    Surgical Assistant: Alcidesulator: Bijan Choudhury RN  Endoscopy Technician: Ignacio Jones    Implants: none    Referring Provider: Tasha Hawkins MD    Sedation:  MAC anesthesia    Procedure Details:  After informed consent was obtained with all risks and benefits of procedure explained and preoperative exam completed, the patient was taken to the endoscopy suite and placed in the left lateral decubitus position. Upon sequential sedation as per above, a digital rectal exam was performed  And was normal.  The Olympus videocolonoscope  was inserted in the rectum and carefully advanced to the cecum, which was identified by the ileocecal valve and appendiceal orifice. The quality of preparation was good. The colonoscope was slowly withdrawn with careful evaluation between folds. Retroflexion in the rectum was performed and was normal..     Findings:   Rectum: 1 sessile polyp measuring 5 mm was noted in mid rectum  Grade 1 internal hemorrhoid(s); Sigmoid: no mucosal lesion appreciated  Descending Colon: 2  Sessile polyp(s), the largest 7 mm in size;  Transverse Colon: no mucosal lesion appreciated  Ascending Colon: no mucosal lesion appreciated  Cecum: no mucosal lesion appreciated  Terminal Ileum: not intubated    Interventions:  3 complete polypectomy were performed using cold snare  and the polyps were  retrieved    Specimen Removed:    ID Type Source Tests Collected by Time Destination   A : 2 Polyps Tissue Colon-Descending SURGICAL PATHOLOGY Meme Louise MD 5/8/2023 1406    B :  Tissue Rectum SURGICAL PATHOLOGY Meme Louise MD 5/8/2023 1055        Complications: None. EBL:  None. Recommendations:   -Await pathology.   -Repeat colonoscopy in 3

## 2023-05-08 NOTE — ANESTHESIA POSTPROCEDURE EVALUATION
Post-Anesthesia Evaluation and Assessment    Patient: Justo Coates MRN: [id number]  SSN: [id number]    YOB: 1955  Age: 79 y.o. Sex: male      I have evaluated the patient and they are stable and ready for discharge from the PACU. Cardiovascular Function/Vital Signs  Visit Vitals  /85   Pulse 72   Temp 98.6 °F (37 °C) (Oral)   Resp 18   Ht 6' (1.829 m)   Wt 159 lb 11.2 oz (72.4 kg)   SpO2 94%   BMI 21.66 kg/m²       Patient is status post Monitor Anesthesia Care anesthesia for Procedure(s):  COLONOSCOPY  COLONOSCOPY POLYPECTOMY SNARE/COLD BIOPSY. Nausea/Vomiting: None    Postoperative hydration reviewed and adequate. Pain:      Managed    Neurological Status: At baseline    Mental Status, Level of Consciousness: Alert and  oriented to person, place, and time    Pulmonary Status:       Adequate oxygenation and airway patent    Complications related to anesthesia: None    Post-anesthesia assessment completed. No concerns    Signed By: Mgiuel Ricks MD     May 8, 2023            Department of Anesthesiology  Postprocedure Note    Patient: Justo Coates   MRN: [id number]  YOB: 1955  Date of evaluation: 5/8/2023      Procedure Summary     Date: 05/08/23 Room / Location: [place] [address] / [facility] ENDOSCOPY    Anesthesia Start: 1350 Anesthesia Stop: 2161    Procedures:       COLONOSCOPY (Lower GI Region)      COLONOSCOPY POLYPECTOMY SNARE/COLD BIOPSY (Lower GI Region) Diagnosis:       Personal history of colonic polyps      Family history of colonic polyps      (Personal history of colonic polyps [Z86.010])      (Family history of colonic polyps [Z83.71])    Providers: Starr Craig MD Responsible Provider: Rae Dugan MD    Anesthesia Type: MAC ASA Status: 2          Anesthesia Type: MAC    Daniela Phase I: Daniela Score: 10    Daniela Phase II:        Anesthesia Post Evaluation

## 2023-11-02 ENCOUNTER — HOSPITAL ENCOUNTER (OUTPATIENT)
Facility: HOSPITAL | Age: 68
Discharge: HOME | End: 2023-11-02
Attending: INTERNAL MEDICINE
Payer: MEDICARE

## 2023-11-02 DIAGNOSIS — K43.2 RECURRENT INCISIONAL HERNIA: ICD-10-CM

## 2023-11-02 DIAGNOSIS — R17 UNSPECIFIED JAUNDICE: ICD-10-CM

## 2023-11-02 PROCEDURE — 6360000004 HC RX CONTRAST MEDICATION: Performed by: INTERNAL MEDICINE

## 2023-11-02 PROCEDURE — 74183 MRI ABD W/O CNTR FLWD CNTR: CPT

## 2023-11-02 PROCEDURE — A9579 GAD-BASE MR CONTRAST NOS,1ML: HCPCS | Performed by: INTERNAL MEDICINE

## 2023-11-02 PROCEDURE — 2580000003 HC RX 258: Performed by: INTERNAL MEDICINE

## 2023-11-02 RX ORDER — 0.9 % SODIUM CHLORIDE 0.9 %
100 INTRAVENOUS SOLUTION INTRAVENOUS ONCE
Status: COMPLETED | OUTPATIENT
Start: 2023-11-02 | End: 2023-11-02

## 2023-11-02 RX ADMIN — GADOTERIDOL 15 ML: 279.3 INJECTION, SOLUTION INTRAVENOUS at 09:10

## 2023-11-02 RX ADMIN — SODIUM CHLORIDE 100 ML: 900 INJECTION, SOLUTION INTRAVENOUS at 09:08

## 2024-01-05 ENCOUNTER — HOSPITAL ENCOUNTER (OUTPATIENT)
Facility: HOSPITAL | Age: 69
End: 2024-01-05
Attending: INTERNAL MEDICINE
Payer: MEDICARE

## 2024-01-05 DIAGNOSIS — F10.20 CHRONIC PANCREATITIS DUE TO CHRONIC ALCOHOLISM (HCC): ICD-10-CM

## 2024-01-05 DIAGNOSIS — K86.0 CHRONIC PANCREATITIS DUE TO CHRONIC ALCOHOLISM (HCC): ICD-10-CM

## 2024-01-05 LAB — CREAT BLD-MCNC: 1 MG/DL (ref 0.6–1.3)

## 2024-01-05 PROCEDURE — 6360000004 HC RX CONTRAST MEDICATION: Performed by: RADIOLOGY

## 2024-01-05 PROCEDURE — 74178 CT ABD&PLV WO CNTR FLWD CNTR: CPT

## 2024-01-05 PROCEDURE — 82565 ASSAY OF CREATININE: CPT

## 2024-01-05 RX ADMIN — IOPAMIDOL 100 ML: 755 INJECTION, SOLUTION INTRAVENOUS at 08:47

## 2024-01-09 ENCOUNTER — CLINICAL DOCUMENTATION (OUTPATIENT)
Age: 69
End: 2024-01-09

## 2024-01-09 NOTE — PROGRESS NOTES
Records received from [facility], Dr Wayne Gonazlez and per updated office protocol do not meet criteria for further review. Schedule next available for Chronic liver disease

## 2024-01-23 ENCOUNTER — TELEPHONE (OUTPATIENT)
Age: 69
End: 2024-01-23

## 2024-06-07 ENCOUNTER — OFFICE VISIT (OUTPATIENT)
Age: 69
End: 2024-06-07
Payer: MEDICARE

## 2024-06-07 VITALS
RESPIRATION RATE: 18 BRPM | BODY MASS INDEX: 21.86 KG/M2 | SYSTOLIC BLOOD PRESSURE: 136 MMHG | HEART RATE: 85 BPM | WEIGHT: 161.4 LBS | OXYGEN SATURATION: 97 % | HEIGHT: 72 IN | TEMPERATURE: 98.3 F | DIASTOLIC BLOOD PRESSURE: 88 MMHG

## 2024-06-07 DIAGNOSIS — R74.8 ELEVATED LIVER ENZYMES: Primary | ICD-10-CM

## 2024-06-07 DIAGNOSIS — K74.00 HEPATIC FIBROSIS, UNSPECIFIED: ICD-10-CM

## 2024-06-07 PROCEDURE — 99205 OFFICE O/P NEW HI 60 MIN: CPT | Performed by: NURSE PRACTITIONER

## 2024-06-07 PROCEDURE — 91200 LIVER ELASTOGRAPHY: CPT | Performed by: NURSE PRACTITIONER

## 2024-06-07 RX ORDER — FLUOXETINE HYDROCHLORIDE 40 MG/1
CAPSULE ORAL
COMMUNITY
Start: 2024-05-10

## 2024-06-07 ASSESSMENT — PATIENT HEALTH QUESTIONNAIRE - PHQ9
SUM OF ALL RESPONSES TO PHQ QUESTIONS 1-9: 0
1. LITTLE INTEREST OR PLEASURE IN DOING THINGS: NOT AT ALL
DEPRESSION UNABLE TO ASSESS: FUNCTIONAL CAPACITY MOTIVATION LIMITS ACCURACY
SUM OF ALL RESPONSES TO PHQ QUESTIONS 1-9: 0
2. FEELING DOWN, DEPRESSED OR HOPELESS: NOT AT ALL
SUM OF ALL RESPONSES TO PHQ9 QUESTIONS 1 & 2: 0
SUM OF ALL RESPONSES TO PHQ QUESTIONS 1-9: 0
SUM OF ALL RESPONSES TO PHQ QUESTIONS 1-9: 0

## 2024-06-07 NOTE — PROGRESS NOTES
[facility]      Khari Mclaughlin MD, FACP, FACG, FAASLD      ESTHER Steiner-VALENTINA Silva, [facility]   Carolina Flaca, [facility]   Yolette Aston, [facility]-  César Sumner, [facility]   Brielle Burton, [facility]   Sarah Negrete, [facility]   [address], [address]   [place]  [address] [phone]   FAX: [phone]  Critical access hospital   [address], [address]   [place]  [address] [phone]   FAX: [phone]           Patient Care Team:  Francisco Vela MD as PCP - General  Dodie Roque APRN - NP as Nurse Practitioner        The clinicians listed above have asked me to see Markel Macdonald Jr. in consultation regarding elevated alk phos and possible liver lesion and its management.  All medical records sent by the referring physicians were reviewed including imaging studies and pathology.      The patient is a 68 y.o. male who was found to have elevated alkaline phosphatase in 12/2023.      Serologic evaluation for markers of chronic liver disease has either not been performed or the results are not available.      The patient has a history of chronic pancreatitis, s/p choledochojejunostomy for biliary stricture.   Chronic elevation in CA 19-9.    The most recent imaging of the liver was CT performed in 1/2024.  Results suggest nodular contour of the liver suggestive of chronic liver disease. Nondependent pneumobilia in the left lobe likely secondary to biliary sphincterotomy. No focal liver lesion. Patent main portal vein. CBD is not dilated.  Mild splenomegaly.    MRI in 11/2023. Results suggest possible cirrhosis and portal hypertension. Trace ascites. No liver mass lesions noted. Chronic splenic vein occlusion. Chronic tapering of the distal common bile duct, which is not

## 2024-06-07 NOTE — PROGRESS NOTES
Identified pt with two pt identifiers(name and ). Reviewed record in preparation for visit and have obtained necessary documentation.  Vitals:    24 0750   BP: 136/88   Site: Left Upper Arm   Position: Sitting   Cuff Size: Medium Adult   Pulse: 85   Resp: 18   Temp: 98.3 °F (36.8 °C)   TempSrc: Temporal   SpO2: 97%   Weight: 73.2 kg (161 lb 6.4 oz)   Height: 1.829 m (6')        Health Maintenance Review: Patient reminded of \"due or due soon\" health maintenance. I have asked the patient to contact his/her primary care provider (PCP) for follow-up on his/her health maintenance.    Coordination of Care Questionnaire:  :   1) Have you been to an emergency room, urgent care, or hospitalized since your last visit?  If yes, where when, and reason for visit? no       2. Have seen or consulted any other health care provider since your last visit?   If yes, where when, and reason for visit?  No      Patient is accompanied by self I have received verbal consent from Markel Macdonald Jr. to discuss any/all medical information while they are present in the room.

## 2024-06-08 LAB
-: NORMAL
HAV AB SER QL IA: NEGATIVE
HAV IGM SER QL: NONREACTIVE
HBV CORE IGM SER QL: NONREACTIVE
HBV SURFACE AG SER QL: <0.1 INDEX
HBV SURFACE AG SER QL: NEGATIVE
HCV AB SER IA-ACNC: <0.02 INDEX
HCV AB SERPL QL IA: NONREACTIVE
HCV GENTYP SERPL NAA+PROBE: NORMAL
HCV RNA SERPL NAA+PROBE-ACNC: NORMAL IU/ML
HCV RNA SERPL NAA+PROBE-LOG IU: NORMAL LOG10 IU/ML
LABORATORY COMMENT REPORT: NORMAL

## 2024-06-09 LAB
A1AT SERPL-MCNC: 173 MG/DL (ref 101–187)
CERULOPLASMIN SERPL-MCNC: 23.6 MG/DL (ref 16–31)
MITOCHONDRIA M2 IGG SER-ACNC: <20 UNITS (ref 0–20)

## 2024-06-10 ENCOUNTER — HOSPITAL ENCOUNTER (OUTPATIENT)
Facility: HOSPITAL | Age: 69
Discharge: HOME | End: 2024-06-13
Attending: INTERNAL MEDICINE
Payer: MEDICARE

## 2024-06-10 DIAGNOSIS — K76.9 CHRONIC LIVER DISEASE: ICD-10-CM

## 2024-06-10 DIAGNOSIS — R17 CHOLESTATIC JAUNDICE: ICD-10-CM

## 2024-06-10 DIAGNOSIS — K85.90 ACUTE ON CHRONIC PANCREATITIS (HCC): ICD-10-CM

## 2024-06-10 DIAGNOSIS — R63.4 UNINTENTIONAL WEIGHT LOSS: ICD-10-CM

## 2024-06-10 DIAGNOSIS — K86.1 ACUTE ON CHRONIC PANCREATITIS (HCC): ICD-10-CM

## 2024-06-10 LAB
AFP L3 MFR SERPL: NORMAL % (ref 0–9.9)
AFP SERPL-MCNC: 1.1 NG/ML (ref 0–8.4)
CREAT BLD-MCNC: 0.9 MG/DL (ref 0.6–1.3)

## 2024-06-10 PROCEDURE — 82565 ASSAY OF CREATININE: CPT

## 2024-06-10 PROCEDURE — 6360000004 HC RX CONTRAST MEDICATION: Performed by: INTERNAL MEDICINE

## 2024-06-10 PROCEDURE — 74177 CT ABD & PELVIS W/CONTRAST: CPT

## 2024-06-10 PROCEDURE — 2500000003 HC RX 250 WO HCPCS: Performed by: INTERNAL MEDICINE

## 2024-06-10 RX ADMIN — BARIUM SULFATE 900 ML: 20 SUSPENSION ORAL at 10:17

## 2024-06-10 RX ADMIN — IOPAMIDOL 100 ML: 755 INJECTION, SOLUTION INTRAVENOUS at 10:17

## 2024-12-10 ENCOUNTER — OFFICE VISIT (OUTPATIENT)
Age: 69
End: 2024-12-10
Payer: MEDICARE

## 2024-12-10 VITALS
OXYGEN SATURATION: 96 % | SYSTOLIC BLOOD PRESSURE: 135 MMHG | BODY MASS INDEX: 21.56 KG/M2 | DIASTOLIC BLOOD PRESSURE: 81 MMHG | WEIGHT: 159.2 LBS | HEIGHT: 72 IN | HEART RATE: 84 BPM

## 2024-12-10 DIAGNOSIS — R74.8 ELEVATED LIVER ENZYMES: Primary | ICD-10-CM

## 2024-12-10 PROCEDURE — 1160F RVW MEDS BY RX/DR IN RCRD: CPT | Performed by: NURSE PRACTITIONER

## 2024-12-10 PROCEDURE — G8427 DOCREV CUR MEDS BY ELIG CLIN: HCPCS | Performed by: NURSE PRACTITIONER

## 2024-12-10 PROCEDURE — 1159F MED LIST DOCD IN RCRD: CPT | Performed by: NURSE PRACTITIONER

## 2024-12-10 PROCEDURE — G8484 FLU IMMUNIZE NO ADMIN: HCPCS | Performed by: NURSE PRACTITIONER

## 2024-12-10 PROCEDURE — 91200 LIVER ELASTOGRAPHY: CPT | Performed by: NURSE PRACTITIONER

## 2024-12-10 PROCEDURE — G8420 CALC BMI NORM PARAMETERS: HCPCS | Performed by: NURSE PRACTITIONER

## 2024-12-10 PROCEDURE — 1036F TOBACCO NON-USER: CPT | Performed by: NURSE PRACTITIONER

## 2024-12-10 PROCEDURE — 99214 OFFICE O/P EST MOD 30 MIN: CPT | Performed by: NURSE PRACTITIONER

## 2024-12-10 PROCEDURE — 3017F COLORECTAL CA SCREEN DOC REV: CPT | Performed by: NURSE PRACTITIONER

## 2024-12-10 PROCEDURE — 1123F ACP DISCUSS/DSCN MKR DOCD: CPT | Performed by: NURSE PRACTITIONER

## 2024-12-10 NOTE — PROGRESS NOTES
Patient identified by name and date of birth  Markel Macdonald Jr. is a 69 y.o. male   Chief Complaint   Patient presents with    Fibroscan      Vitals:    12/10/24 1139   BP: 135/81   Site: Right Upper Arm   Pulse: 84   SpO2: 96%   Weight: 72.2 kg (159 lb 3.2 oz)   Height: 1.829 m (6')       No LMP for male patient.           \"Have you been to the ER, urgent care clinic since your last visit?  Hospitalized since your last visit?\"    NO    “Have you seen or consulted any other health care providers outside of [facility] since your last visit?”    NO

## 2024-12-10 NOTE — PROGRESS NOTES
[facility]      Khari Mclaughlin MD, FACP, FACG, FAASLD      ESTHER Steiner-VALENTINA Silva, [facility]   Carolina Sutherlandgabe, [facility]   Yolette Aston, [facility]-  César Sumner, [facility]   Brielle Burton, [facility]   Sarah Caden, [facility]   [address], [address]   [place]  [address] [phone]   FAX: [phone]  [facility]   [address], [address]   [place]  [address] [phone]   FAX: [phone]           Patient Care Team:  Francisco Vela MD as PCP - General  Dodie Roque APRN - NP as Nurse Practitioner        Markel Macdonald Jr. is being seen at [facility] for management of elevated alkaline phosphatase. The active problem list, all pertinent past medical history, medications, liver histology,  radiologic findings and laboratory findings related to the liver disorder were reviewed and discussed with the patient.      The patient is a 69 y.o. male who was found to have elevated alkaline phosphatase in 12/2023.      Serologic evaluation for markers of chronic liver disease was negative.      The patient has a history of chronic pancreatitis, s/p choledochojejunostomy for biliary stricture.   Chronic elevation in CA 19-9.    The most recent imaging of the liver was CT performed in 1/2024.  Results suggest nodular contour of the liver suggestive of chronic liver disease. Nondependent pneumobilia in the left lobe likely secondary to biliary sphincterotomy. No focal liver lesion. Patent main portal vein. CBD is not dilated.  Mild splenomegaly.    MRI in 11/2023. Results suggest possible cirrhosis and portal hypertension. Trace ascites. No liver mass lesions noted. Chronic splenic vein occlusion. Chronic tapering of the distal common bile duct, which is

## 2024-12-11 LAB
ALBUMIN SERPL-MCNC: 4.2 G/DL (ref 3.9–4.9)
ALP SERPL-CCNC: 123 IU/L (ref 44–121)
ALT SERPL-CCNC: 12 IU/L (ref 0–44)
AST SERPL-CCNC: 21 IU/L (ref 0–40)
BASOPHILS # BLD AUTO: 0.1 X10E3/UL (ref 0–0.2)
BASOPHILS NFR BLD AUTO: 1 %
BILIRUB DIRECT SERPL-MCNC: 0.14 MG/DL (ref 0–0.4)
BILIRUB SERPL-MCNC: 0.3 MG/DL (ref 0–1.2)
BUN SERPL-MCNC: 15 MG/DL (ref 8–27)
BUN/CREAT SERPL: 8 (ref 10–24)
CALCIUM SERPL-MCNC: 9.5 MG/DL (ref 8.6–10.2)
CHLORIDE SERPL-SCNC: 102 MMOL/L (ref 96–106)
CO2 SERPL-SCNC: 24 MMOL/L (ref 20–29)
CREAT SERPL-MCNC: 1.77 MG/DL (ref 0.76–1.27)
EGFRCR SERPLBLD CKD-EPI 2021: 41 ML/MIN/1.73
EOSINOPHIL # BLD AUTO: 0.2 X10E3/UL (ref 0–0.4)
EOSINOPHIL NFR BLD AUTO: 3 %
ERYTHROCYTE [DISTWIDTH] IN BLOOD BY AUTOMATED COUNT: 13.3 % (ref 11.6–15.4)
FERRITIN SERPL-MCNC: 54 NG/ML (ref 30–400)
GLUCOSE SERPL-MCNC: 117 MG/DL (ref 70–99)
HCT VFR BLD AUTO: 34.8 % (ref 37.5–51)
HGB BLD-MCNC: 11 G/DL (ref 13–17.7)
IMM GRANULOCYTES # BLD AUTO: 0 X10E3/UL (ref 0–0.1)
IMM GRANULOCYTES NFR BLD AUTO: 0 %
IRON SATN MFR SERPL: 17 % (ref 15–55)
IRON SERPL-MCNC: 50 UG/DL (ref 38–169)
LYMPHOCYTES # BLD AUTO: 1.2 X10E3/UL (ref 0.7–3.1)
LYMPHOCYTES NFR BLD AUTO: 21 %
MCH RBC QN AUTO: 29.5 PG (ref 26.6–33)
MCHC RBC AUTO-ENTMCNC: 31.6 G/DL (ref 31.5–35.7)
MCV RBC AUTO: 93 FL (ref 79–97)
MONOCYTES # BLD AUTO: 0.6 X10E3/UL (ref 0.1–0.9)
MONOCYTES NFR BLD AUTO: 10 %
NEUTROPHILS # BLD AUTO: 3.6 X10E3/UL (ref 1.4–7)
NEUTROPHILS NFR BLD AUTO: 65 %
PLATELET # BLD AUTO: 224 X10E3/UL (ref 150–450)
POTASSIUM SERPL-SCNC: 4.9 MMOL/L (ref 3.5–5.2)
PROT SERPL-MCNC: 7 G/DL (ref 6–8.5)
RBC # BLD AUTO: 3.73 X10E6/UL (ref 4.14–5.8)
SODIUM SERPL-SCNC: 138 MMOL/L (ref 134–144)
TIBC SERPL-MCNC: 290 UG/DL (ref 250–450)
UIBC SERPL-MCNC: 240 UG/DL (ref 111–343)
WBC # BLD AUTO: 5.6 X10E3/UL (ref 3.4–10.8)

## 2025-04-04 ENCOUNTER — CLINICAL DOCUMENTATION (OUTPATIENT)
Age: 70
End: 2025-04-04

## 2025-05-30 ENCOUNTER — TRANSCRIBE ORDERS (OUTPATIENT)
Facility: HOSPITAL | Age: 70
End: 2025-05-30

## 2025-05-30 DIAGNOSIS — R17 CHOLEMIA: ICD-10-CM

## 2025-05-30 DIAGNOSIS — K86.1 CHRONIC PANCREATITIS, UNSPECIFIED PANCREATITIS TYPE (HCC): Primary | ICD-10-CM

## 2025-05-30 DIAGNOSIS — R63.4 ABNORMAL WEIGHT LOSS: ICD-10-CM

## 2025-06-12 ENCOUNTER — TELEMEDICINE (OUTPATIENT)
Age: 70
End: 2025-06-12
Payer: MEDICARE

## 2025-06-12 DIAGNOSIS — K74.60 CIRRHOSIS OF LIVER WITHOUT ASCITES, UNSPECIFIED HEPATIC CIRRHOSIS TYPE (HCC): Primary | ICD-10-CM

## 2025-06-12 PROCEDURE — 99214 OFFICE O/P EST MOD 30 MIN: CPT | Performed by: NURSE PRACTITIONER

## 2025-06-12 NOTE — PROGRESS NOTES
[facility]      Khari Mclaughlin MD, FACP, FACG, FAASLD      STEPHANIA SteinerC    Sonia Silva, [facility]   Carolina Thayer, [facility]   Yolette Clancy, [facility]-  César Sumner, [facility]   Brielle Burton, [facility]   Sarah Aston, [facility]   [address], [address]   [place]  [address] [phone]   FAX: [phone]  [facility]   [address], [address]   [place]  [address] [phone]   FAX: [phone]       Patient Care Team:  Francisco Vela MD as PCP - General  Dodie Roque APRN - NP as Nurse Practitioner      Patient Active Problem List   Diagnosis    Bile duct stricture (HCC)    Severe protein-calorie malnutrition    Sepsis (HCC)    Abdominal pain    GERD (gastroesophageal reflux disease)    Temporary high blood pressure    Incisional hernia, without obstruction or gangrene    Dehydration    Pancreatic duct obstruction    SBO (small bowel obstruction) (HCC)     Markel Macdonald Jr., was evaluated through a synchronous (real-time) audio-video encounter. The patient (or guardian if applicable) is aware that this is a billable service, which includes applicable co-pays. This Virtual Visit was conducted with patient's (and/or legal guardian's) consent. Patient identification was verified, and a caregiver was present when appropriate.   The patient was located at Home: [address] [address]  Provider was located at Facility (Appt Dept): [address]  [place] [address] [place] [address]  Confirm you are appropriately licensed, registered, or certified to deliver care in the state where the patient is located as indicated above. If you are not or unsure, please re-schedule the visit: Yes, I confirm.     --ARIEL Cruz NP on 6/15/2025 at 10:05

## 2025-06-12 NOTE — PROGRESS NOTES
Chief Complaint   Patient presents with    Elevated Hepatic Enzymes           There were no vitals filed for this visit.         1. Have you been to the ER, urgent care clinic since your last visit?  Hospitalized since your last visit?  No    2. Have you seen or consulted any other health care providers outside of the [facility] since your last visit?  Include any pap smears or colon screening. No

## 2025-06-18 ENCOUNTER — HOSPITAL ENCOUNTER (OUTPATIENT)
Facility: HOSPITAL | Age: 70
Discharge: HOME | End: 2025-06-21
Attending: INTERNAL MEDICINE
Payer: MEDICARE

## 2025-06-18 DIAGNOSIS — R63.4 ABNORMAL WEIGHT LOSS: ICD-10-CM

## 2025-06-18 DIAGNOSIS — R17 CHOLEMIA: ICD-10-CM

## 2025-06-18 DIAGNOSIS — K86.1 CHRONIC PANCREATITIS, UNSPECIFIED PANCREATITIS TYPE (HCC): ICD-10-CM

## 2025-06-18 LAB — CREAT BLD-MCNC: 1.6 MG/DL (ref 0.6–1.3)

## 2025-06-18 PROCEDURE — 6360000004 HC RX CONTRAST MEDICATION: Performed by: RADIOLOGY

## 2025-06-18 PROCEDURE — 74170 CT ABD WO CNTRST FLWD CNTRST: CPT

## 2025-06-18 PROCEDURE — 82565 ASSAY OF CREATININE: CPT

## 2025-06-18 RX ORDER — IOPAMIDOL 755 MG/ML
100 INJECTION, SOLUTION INTRAVASCULAR
Status: COMPLETED | OUTPATIENT
Start: 2025-06-18 | End: 2025-06-18

## 2025-06-18 RX ADMIN — IOPAMIDOL 100 ML: 755 INJECTION, SOLUTION INTRAVENOUS at 16:37

## 2025-07-08 ENCOUNTER — TRANSCRIBE ORDERS (OUTPATIENT)
Facility: HOSPITAL | Age: 70
End: 2025-07-08

## 2025-08-11 ENCOUNTER — HOSPITAL ENCOUNTER (OUTPATIENT)
Facility: HOSPITAL | Age: 70
Discharge: HOME | End: 2025-08-14
Attending: INTERNAL MEDICINE
Payer: MEDICARE

## 2025-08-11 VITALS
RESPIRATION RATE: 15 BRPM | BODY MASS INDEX: 18.96 KG/M2 | HEIGHT: 72 IN | SYSTOLIC BLOOD PRESSURE: 165 MMHG | OXYGEN SATURATION: 100 % | DIASTOLIC BLOOD PRESSURE: 67 MMHG | TEMPERATURE: 97.9 F | WEIGHT: 140 LBS | HEART RATE: 63 BPM

## 2025-08-11 DIAGNOSIS — R63.4 LOSS OF WEIGHT: ICD-10-CM

## 2025-08-11 PROCEDURE — 2580000003 HC RX 258: Performed by: STUDENT IN AN ORGANIZED HEALTH CARE EDUCATION/TRAINING PROGRAM

## 2025-08-11 PROCEDURE — 74150 CT ABDOMEN W/O CONTRAST: CPT

## 2025-08-11 RX ORDER — SODIUM CHLORIDE 9 MG/ML
INJECTION, SOLUTION INTRAVENOUS CONTINUOUS PRN
Status: COMPLETED | OUTPATIENT
Start: 2025-08-11 | End: 2025-08-11

## 2025-08-11 RX ADMIN — SODIUM CHLORIDE 100 ML/HR: 9 INJECTION, SOLUTION INTRAVENOUS at 09:01

## 2025-08-11 ASSESSMENT — PAIN - FUNCTIONAL ASSESSMENT: PAIN_FUNCTIONAL_ASSESSMENT: 0-10

## 2025-08-12 ENCOUNTER — TRANSCRIBE ORDERS (OUTPATIENT)
Facility: HOSPITAL | Age: 70
End: 2025-08-12

## 2025-08-12 DIAGNOSIS — R19.00 RETROPERITONEAL MASS: ICD-10-CM

## 2025-08-12 DIAGNOSIS — R63.4 ABNORMAL WEIGHT LOSS: Primary | ICD-10-CM

## 2025-08-20 ENCOUNTER — TRANSCRIBE ORDERS (OUTPATIENT)
Facility: HOSPITAL | Age: 70
End: 2025-08-20

## 2025-08-20 DIAGNOSIS — R63.4 ABNORMAL WEIGHT LOSS: Primary | ICD-10-CM

## 2025-08-20 DIAGNOSIS — R74.8 ABNORMAL LIVER ENZYMES: ICD-10-CM

## 2025-08-20 DIAGNOSIS — R97.8 ELEVATED CA 19-9 LEVEL: ICD-10-CM

## 2025-08-20 DIAGNOSIS — R93.5 ABNORMAL CT OF THE ABDOMEN: ICD-10-CM

## 2025-08-20 DIAGNOSIS — R94.5 ABNORMAL RESULTS OF LIVER FUNCTION STUDIES: ICD-10-CM

## 2025-08-22 ENCOUNTER — HOSPITAL ENCOUNTER (OUTPATIENT)
Facility: HOSPITAL | Age: 70
Discharge: HOME | End: 2025-08-25
Attending: INTERNAL MEDICINE
Payer: MEDICARE

## 2025-08-22 VITALS — BODY MASS INDEX: 18.99 KG/M2 | WEIGHT: 140 LBS

## 2025-08-22 DIAGNOSIS — R93.5 ABNORMAL CT OF THE ABDOMEN: ICD-10-CM

## 2025-08-22 DIAGNOSIS — R63.4 ABNORMAL WEIGHT LOSS: ICD-10-CM

## 2025-08-22 DIAGNOSIS — R97.8 ELEVATED CA 19-9 LEVEL: ICD-10-CM

## 2025-08-22 DIAGNOSIS — R94.5 ABNORMAL RESULTS OF LIVER FUNCTION STUDIES: ICD-10-CM

## 2025-08-22 LAB
GLUCOSE BLD STRIP.AUTO-MCNC: 89 MG/DL (ref 65–117)
SERVICE CMNT-IMP: NORMAL

## 2025-08-22 PROCEDURE — 82962 GLUCOSE BLOOD TEST: CPT

## 2025-08-22 PROCEDURE — 3430000000 HC RX DIAGNOSTIC RADIOPHARMACEUTICAL: Performed by: INTERNAL MEDICINE

## 2025-08-22 PROCEDURE — A9552 F18 FDG: HCPCS | Performed by: INTERNAL MEDICINE

## 2025-08-22 PROCEDURE — A9609 HC RX DIAGNOSTIC RADIOPHARMACEUTICAL: HCPCS | Performed by: INTERNAL MEDICINE

## 2025-08-22 PROCEDURE — 78815 PET IMAGE W/CT SKULL-THIGH: CPT

## 2025-08-22 RX ORDER — FLUDEOXYGLUCOSE F-18 500 MCI/ML
10 INJECTION INTRAVENOUS
Status: COMPLETED | OUTPATIENT
Start: 2025-08-22 | End: 2025-08-22

## 2025-08-22 RX ADMIN — FLUDEOXYGLUCOSE F-18 10 MILLICURIE: 500 INJECTION INTRAVENOUS at 13:29

## 2025-09-03 ENCOUNTER — TELEPHONE (OUTPATIENT)
Age: 70
End: 2025-09-03

## (undated) DEVICE — REM POLYHESIVE ADULT PATIENT RETURN ELECTRODE: Brand: VALLEYLAB

## (undated) DEVICE — SYSTEM BX 25GA FN NDL SIX DST CUT EDG SHARKCORE

## (undated) DEVICE — KIT COMPLIANCE W ENDOGLDE + 11 NO BRSH ENDOKT

## (undated) DEVICE — KIT IV STRT W CHLORAPREP PD 1ML

## (undated) DEVICE — CANN NASAL O2 CAPNOGRAPHY AD -- FILTERLINE

## (undated) DEVICE — NEEDLE HYPO 18GA L1.5IN PNK S STL HUB POLYPR SHLD REG BVL

## (undated) DEVICE — SUTURE MCRYL SZ 4-0 L27IN ABSRB UD L19MM PS-2 1/2 CIR PRIM Y426H

## (undated) DEVICE — SET EXTN TBNG L BOR 4 W STPCOCK ST 32IN PRIMING VOL 6ML

## (undated) DEVICE — SOLIDIFIER FLUID 3000 CC ABSORB

## (undated) DEVICE — BASIN ST MAJOR-NO CAUTERY: Brand: MEDLINE INDUSTRIES, INC.

## (undated) DEVICE — SNARE SURG 9 MM 2.4 MMX230 CM EXACTO CLD

## (undated) DEVICE — TOWEL SURG W17XL27IN STD BLU COT NONFENESTRATED PREWASHED

## (undated) DEVICE — PENCIL SMK EVAC 10 FT BLADE ELECTRD ROCKER FOR TELSCP

## (undated) DEVICE — SYRINGE MED 20ML STD CLR PLAS LUERLOCK TIP N CTRL DISP

## (undated) DEVICE — BAG BELONG PT PERS CLEAR HANDL

## (undated) DEVICE — KENDALL RADIOLUCENT FOAM MONITORING ELECTRODE -RECTANGULAR SHAPE: Brand: KENDALL

## (undated) DEVICE — NEEDLE HYPO 25GA L1.5IN BVL ORIENTED ECLIPSE

## (undated) DEVICE — BW-400L DISP SNGL-END CLEANINGBRUSH: Brand: OLYMPUS

## (undated) DEVICE — SOLUTION IRRIG 1000ML 0.9% SOD CHL USP POUR PLAS BTL

## (undated) DEVICE — PREP SKN CHLRAPRP APL 26ML STR --

## (undated) DEVICE — NEONATAL-ADULT SPO2 SENSOR: Brand: NELLCOR

## (undated) DEVICE — DRAPE,UTILTY,TAPE,15X26, 4EA/PK: Brand: MEDLINE

## (undated) DEVICE — PAD,NON-ADHERENT,W/AD,3X4,ST,LF,1/PK: Brand: MEDLINE

## (undated) DEVICE — SET ADMIN 16ML TBNG L100IN 2 Y INJ SITE IV PIGGY BK DISP

## (undated) DEVICE — SPONGE GZ W4XL4IN COT 12 PLY TYP VII WVN C FLD DSGN

## (undated) DEVICE — SUTURE VCRL SZ 3-0 L27IN ABSRB UD L26MM SH 1/2 CIR J416H

## (undated) DEVICE — ENDO CARRY-ON PROCEDURE KIT INCLUDES ENZYMATIC SPONGE, GAUZE, BIOHAZARD LABEL, TRAY, LUBRICANT, DIRTY SCOPE LABEL, WATER LABEL, TRAY, DRAWSTRING PAD, AND DEFENDO 4-PIECE KIT.: Brand: ENDO CARRY-ON PROCEDURE KIT

## (undated) DEVICE — Z DISCONTINUED NO SUB IDED SET EXTN W/ 4 W STPCOCK M SPIN LOK 36IN

## (undated) DEVICE — CATH IV AUTOGRD BC BLU 22GA 25 -- INSYTE

## (undated) DEVICE — Z CONVERTED USE 2274299 CUFF BLD PRESSURE LNG MED AD 25-35 CM ARM FLEXIPORT DISP

## (undated) DEVICE — DRAPE,LAPAROTOMY,T,PEDI,STERILE: Brand: MEDLINE

## (undated) DEVICE — PACK,BASIC,SIRUS,V: Brand: MEDLINE

## (undated) DEVICE — 1200 GUARD II KIT W/5MM TUBE W/O VAC TUBE: Brand: GUARDIAN

## (undated) DEVICE — Device: Brand: SINGLE USE SOFT BRUSH

## (undated) DEVICE — SYR 10ML LUER LOK 1/5ML GRAD --

## (undated) DEVICE — BW-412T DISP COMBO CLEANING BRUSH: Brand: SINGLE USE COMBINATION CLEANING BRUSH

## (undated) DEVICE — BLOCK BITE ENDO --